# Patient Record
Sex: MALE | Race: WHITE | NOT HISPANIC OR LATINO | Employment: FULL TIME | URBAN - METROPOLITAN AREA
[De-identification: names, ages, dates, MRNs, and addresses within clinical notes are randomized per-mention and may not be internally consistent; named-entity substitution may affect disease eponyms.]

---

## 2017-03-02 ENCOUNTER — TRANSCRIBE ORDERS (OUTPATIENT)
Dept: LAB | Age: 37
End: 2017-03-02

## 2017-03-02 ENCOUNTER — ALLSCRIPTS OFFICE VISIT (OUTPATIENT)
Dept: OTHER | Facility: OTHER | Age: 37
End: 2017-03-02

## 2017-03-02 ENCOUNTER — APPOINTMENT (OUTPATIENT)
Dept: LAB | Age: 37
End: 2017-03-02
Payer: COMMERCIAL

## 2017-03-02 DIAGNOSIS — Z13.1 ENCOUNTER FOR SCREENING FOR DIABETES MELLITUS: ICD-10-CM

## 2017-03-02 DIAGNOSIS — M54.9 DORSALGIA: ICD-10-CM

## 2017-03-02 DIAGNOSIS — E66.01 MORBID (SEVERE) OBESITY DUE TO EXCESS CALORIES (HCC): ICD-10-CM

## 2017-03-02 DIAGNOSIS — Z13.220 ENCOUNTER FOR SCREENING FOR LIPOID DISORDERS: ICD-10-CM

## 2017-03-02 DIAGNOSIS — E11.9 TYPE 2 DIABETES MELLITUS WITHOUT COMPLICATIONS (HCC): ICD-10-CM

## 2017-03-02 LAB
ALBUMIN SERPL BCP-MCNC: 3.5 G/DL (ref 3.5–5)
ALP SERPL-CCNC: 91 U/L (ref 46–116)
ALT SERPL W P-5'-P-CCNC: 66 U/L (ref 12–78)
ANION GAP SERPL CALCULATED.3IONS-SCNC: 5 MMOL/L (ref 4–13)
AST SERPL W P-5'-P-CCNC: 26 U/L (ref 5–45)
BASOPHILS # BLD AUTO: 0.01 THOUSANDS/ΜL (ref 0–0.1)
BASOPHILS NFR BLD AUTO: 0 % (ref 0–1)
BILIRUB SERPL-MCNC: 0.82 MG/DL (ref 0.2–1)
BUN SERPL-MCNC: 11 MG/DL (ref 5–25)
CALCIUM SERPL-MCNC: 9.4 MG/DL (ref 8.3–10.1)
CHLORIDE SERPL-SCNC: 102 MMOL/L (ref 100–108)
CHOLEST SERPL-MCNC: 159 MG/DL (ref 50–200)
CO2 SERPL-SCNC: 34 MMOL/L (ref 21–32)
CREAT SERPL-MCNC: 0.93 MG/DL (ref 0.6–1.3)
EOSINOPHIL # BLD AUTO: 0.16 THOUSAND/ΜL (ref 0–0.61)
EOSINOPHIL NFR BLD AUTO: 2 % (ref 0–6)
ERYTHROCYTE [DISTWIDTH] IN BLOOD BY AUTOMATED COUNT: 16.1 % (ref 11.6–15.1)
EST. AVERAGE GLUCOSE BLD GHB EST-MCNC: 140 MG/DL
GFR SERPL CREATININE-BSD FRML MDRD: >60 ML/MIN/1.73SQ M
GLUCOSE SERPL-MCNC: 104 MG/DL (ref 65–140)
HBA1C MFR BLD: 6.5 % (ref 4.2–6.3)
HCT VFR BLD AUTO: 56.8 % (ref 36.5–49.3)
HDLC SERPL-MCNC: 37 MG/DL (ref 40–60)
HGB BLD-MCNC: 17.8 G/DL (ref 12–17)
LDLC SERPL CALC-MCNC: 104 MG/DL (ref 0–100)
LYMPHOCYTES # BLD AUTO: 1.64 THOUSANDS/ΜL (ref 0.6–4.47)
LYMPHOCYTES NFR BLD AUTO: 16 % (ref 14–44)
MCH RBC QN AUTO: 29.3 PG (ref 26.8–34.3)
MCHC RBC AUTO-ENTMCNC: 31.3 G/DL (ref 31.4–37.4)
MCV RBC AUTO: 93 FL (ref 82–98)
MONOCYTES # BLD AUTO: 0.67 THOUSAND/ΜL (ref 0.17–1.22)
MONOCYTES NFR BLD AUTO: 7 % (ref 4–12)
NEUTROPHILS # BLD AUTO: 7.66 THOUSANDS/ΜL (ref 1.85–7.62)
NEUTS SEG NFR BLD AUTO: 75 % (ref 43–75)
NRBC BLD AUTO-RTO: 0 /100 WBCS
PLATELET # BLD AUTO: 237 THOUSANDS/UL (ref 149–390)
PMV BLD AUTO: 10.1 FL (ref 8.9–12.7)
POTASSIUM SERPL-SCNC: 4.2 MMOL/L (ref 3.5–5.3)
PROT SERPL-MCNC: 8.5 G/DL (ref 6.4–8.2)
RBC # BLD AUTO: 6.08 MILLION/UL (ref 3.88–5.62)
SODIUM SERPL-SCNC: 141 MMOL/L (ref 136–145)
TRIGL SERPL-MCNC: 88 MG/DL
WBC # BLD AUTO: 10.16 THOUSAND/UL (ref 4.31–10.16)

## 2017-03-02 PROCEDURE — 36415 COLL VENOUS BLD VENIPUNCTURE: CPT

## 2017-03-02 PROCEDURE — 80053 COMPREHEN METABOLIC PANEL: CPT

## 2017-03-02 PROCEDURE — 85025 COMPLETE CBC W/AUTO DIFF WBC: CPT

## 2017-03-02 PROCEDURE — 83036 HEMOGLOBIN GLYCOSYLATED A1C: CPT

## 2017-03-02 PROCEDURE — 80061 LIPID PANEL: CPT

## 2017-03-03 ENCOUNTER — ALLSCRIPTS OFFICE VISIT (OUTPATIENT)
Dept: OTHER | Facility: OTHER | Age: 37
End: 2017-03-03

## 2017-03-03 ENCOUNTER — APPOINTMENT (EMERGENCY)
Dept: RADIOLOGY | Facility: HOSPITAL | Age: 37
DRG: 074 | End: 2017-03-03
Payer: COMMERCIAL

## 2017-03-03 ENCOUNTER — HOSPITAL ENCOUNTER (INPATIENT)
Facility: HOSPITAL | Age: 37
LOS: 1 days | Discharge: HOME HEALTH | DRG: 074 | End: 2017-03-06
Attending: EMERGENCY MEDICINE | Admitting: INTERNAL MEDICINE
Payer: COMMERCIAL

## 2017-03-03 ENCOUNTER — HOSPITAL ENCOUNTER (OUTPATIENT)
Dept: RADIOLOGY | Facility: HOSPITAL | Age: 37
Discharge: HOME | End: 2017-03-03
Payer: COMMERCIAL

## 2017-03-03 DIAGNOSIS — M54.42 ACUTE RIGHT-SIDED LOW BACK PAIN WITH BILATERAL SCIATICA: Primary | ICD-10-CM

## 2017-03-03 DIAGNOSIS — G83.4 CAUDA EQUINA COMPRESSION (HCC): ICD-10-CM

## 2017-03-03 DIAGNOSIS — M54.41 ACUTE RIGHT-SIDED LOW BACK PAIN WITH BILATERAL SCIATICA: Primary | ICD-10-CM

## 2017-03-03 PROBLEM — M54.40 ACUTE RIGHT-SIDED LOW BACK PAIN WITH SCIATICA: Status: ACTIVE | Noted: 2017-03-03

## 2017-03-03 PROBLEM — E66.01 MORBID OBESITY (HCC): Chronic | Status: ACTIVE | Noted: 2017-03-03

## 2017-03-03 PROBLEM — E11.9 TYPE 2 DIABETES MELLITUS (HCC): Chronic | Status: ACTIVE | Noted: 2017-03-03

## 2017-03-03 LAB
BACTERIA UR QL AUTO: NORMAL /HPF
BILIRUB UR QL STRIP: ABNORMAL
CLARITY UR: CLEAR
COLOR UR: ABNORMAL
COLOR, POC: NORMAL
GLUCOSE UR STRIP-MCNC: NEGATIVE MG/DL
HGB UR QL STRIP.AUTO: NEGATIVE
HYALINE CASTS #/AREA URNS LPF: NORMAL /LPF
KETONES UR STRIP-MCNC: NEGATIVE MG/DL
LEUKOCYTE ESTERASE UR QL STRIP: NEGATIVE
NITRITE UR QL STRIP: NEGATIVE
NON-SQ EPI CELLS URNS QL MICRO: NORMAL /HPF
PH UR STRIP.AUTO: 6 [PH] (ref 4.5–8)
PROT UR STRIP-MCNC: ABNORMAL MG/DL
RBC #/AREA URNS AUTO: NORMAL /HPF
SP GR UR STRIP.AUTO: 1.02 (ref 1–1.03)
UROBILINOGEN UR QL STRIP.AUTO: >=8 E.U./DL
WBC #/AREA URNS AUTO: NORMAL /HPF

## 2017-03-03 PROCEDURE — 96374 THER/PROPH/DIAG INJ IV PUSH: CPT

## 2017-03-03 PROCEDURE — 36415 COLL VENOUS BLD VENIPUNCTURE: CPT | Performed by: EMERGENCY MEDICINE

## 2017-03-03 PROCEDURE — 85007 BL SMEAR W/DIFF WBC COUNT: CPT | Performed by: EMERGENCY MEDICINE

## 2017-03-03 PROCEDURE — 96376 TX/PRO/DX INJ SAME DRUG ADON: CPT

## 2017-03-03 PROCEDURE — 85027 COMPLETE CBC AUTOMATED: CPT | Performed by: EMERGENCY MEDICINE

## 2017-03-03 PROCEDURE — 81001 URINALYSIS AUTO W/SCOPE: CPT

## 2017-03-03 PROCEDURE — 72131 CT LUMBAR SPINE W/O DYE: CPT

## 2017-03-03 PROCEDURE — 99285 EMERGENCY DEPT VISIT HI MDM: CPT

## 2017-03-03 PROCEDURE — 81002 URINALYSIS NONAUTO W/O SCOPE: CPT | Performed by: EMERGENCY MEDICINE

## 2017-03-03 PROCEDURE — 96375 TX/PRO/DX INJ NEW DRUG ADDON: CPT

## 2017-03-03 RX ORDER — TRAMADOL HYDROCHLORIDE 50 MG/1
50 TABLET ORAL EVERY 12 HOURS PRN
COMMUNITY
End: 2018-03-01

## 2017-03-03 RX ORDER — METHOCARBAMOL 500 MG/1
500 TABLET, FILM COATED ORAL 3 TIMES DAILY
COMMUNITY
End: 2017-03-06 | Stop reason: HOSPADM

## 2017-03-03 RX ORDER — PREDNISONE 20 MG/1
20 TABLET ORAL 2 TIMES DAILY
COMMUNITY
End: 2017-03-06 | Stop reason: HOSPADM

## 2017-03-03 RX ORDER — KETOROLAC TROMETHAMINE 30 MG/ML
15 INJECTION, SOLUTION INTRAMUSCULAR; INTRAVENOUS ONCE
Status: COMPLETED | OUTPATIENT
Start: 2017-03-03 | End: 2017-03-03

## 2017-03-03 RX ORDER — DEXAMETHASONE SODIUM PHOSPHATE 4 MG/ML
4 INJECTION, SOLUTION INTRA-ARTICULAR; INTRALESIONAL; INTRAMUSCULAR; INTRAVENOUS; SOFT TISSUE EVERY 6 HOURS
Status: DISCONTINUED | OUTPATIENT
Start: 2017-03-04 | End: 2017-03-06

## 2017-03-03 RX ADMIN — HYDROMORPHONE HYDROCHLORIDE 1 MG: 1 INJECTION, SOLUTION INTRAMUSCULAR; INTRAVENOUS; SUBCUTANEOUS at 20:23

## 2017-03-03 RX ADMIN — DEXAMETHASONE SODIUM PHOSPHATE 6 MG: 10 INJECTION INTRAMUSCULAR; INTRAVENOUS at 20:22

## 2017-03-03 RX ADMIN — HYDROMORPHONE HYDROCHLORIDE 1 MG: 1 INJECTION, SOLUTION INTRAMUSCULAR; INTRAVENOUS; SUBCUTANEOUS at 21:00

## 2017-03-03 RX ADMIN — KETOROLAC TROMETHAMINE 15 MG: 30 INJECTION, SOLUTION INTRAMUSCULAR at 21:00

## 2017-03-03 RX ADMIN — Medication 1 APPLICATION: at 20:01

## 2017-03-04 ENCOUNTER — APPOINTMENT (OUTPATIENT)
Dept: RADIOLOGY | Facility: HOSPITAL | Age: 37
DRG: 074 | End: 2017-03-04
Payer: COMMERCIAL

## 2017-03-04 LAB
ALBUMIN SERPL BCP-MCNC: 3.3 G/DL (ref 3.5–5)
ALP SERPL-CCNC: 94 U/L (ref 46–116)
ALT SERPL W P-5'-P-CCNC: 81 U/L (ref 12–78)
ANION GAP SERPL CALCULATED.3IONS-SCNC: 7 MMOL/L (ref 4–13)
ANION GAP SERPL CALCULATED.3IONS-SCNC: 9 MMOL/L (ref 4–13)
APTT PPP: 28 SECONDS (ref 24–36)
AST SERPL W P-5'-P-CCNC: 30 U/L (ref 5–45)
BASOPHILS # BLD MANUAL: 0 THOUSAND/UL (ref 0–0.1)
BASOPHILS NFR MAR MANUAL: 0 % (ref 0–1)
BILIRUB SERPL-MCNC: 1.08 MG/DL (ref 0.2–1)
BUN SERPL-MCNC: 16 MG/DL (ref 5–25)
BUN SERPL-MCNC: 17 MG/DL (ref 5–25)
CALCIUM SERPL-MCNC: 9.4 MG/DL (ref 8.3–10.1)
CALCIUM SERPL-MCNC: 9.4 MG/DL (ref 8.3–10.1)
CHLORIDE SERPL-SCNC: 100 MMOL/L (ref 100–108)
CHLORIDE SERPL-SCNC: 101 MMOL/L (ref 100–108)
CO2 SERPL-SCNC: 27 MMOL/L (ref 21–32)
CO2 SERPL-SCNC: 29 MMOL/L (ref 21–32)
CREAT SERPL-MCNC: 0.77 MG/DL (ref 0.6–1.3)
CREAT SERPL-MCNC: 0.79 MG/DL (ref 0.6–1.3)
EOSINOPHIL # BLD MANUAL: 0.14 THOUSAND/UL (ref 0–0.4)
EOSINOPHIL NFR BLD MANUAL: 1 % (ref 0–6)
ERYTHROCYTE [DISTWIDTH] IN BLOOD BY AUTOMATED COUNT: 15.8 % (ref 11.6–15.1)
ERYTHROCYTE [DISTWIDTH] IN BLOOD BY AUTOMATED COUNT: 16.1 % (ref 11.6–15.1)
GFR SERPL CREATININE-BSD FRML MDRD: >60 ML/MIN/1.73SQ M
GFR SERPL CREATININE-BSD FRML MDRD: >60 ML/MIN/1.73SQ M
GLUCOSE SERPL-MCNC: 107 MG/DL (ref 65–140)
GLUCOSE SERPL-MCNC: 114 MG/DL (ref 65–140)
GLUCOSE SERPL-MCNC: 118 MG/DL (ref 65–140)
GLUCOSE SERPL-MCNC: 120 MG/DL (ref 65–140)
GLUCOSE SERPL-MCNC: 156 MG/DL (ref 65–140)
GLUCOSE SERPL-MCNC: 96 MG/DL (ref 65–140)
HCT VFR BLD AUTO: 55 % (ref 36.5–49.3)
HCT VFR BLD AUTO: 56.6 % (ref 36.5–49.3)
HGB BLD-MCNC: 17.8 G/DL (ref 12–17)
HGB BLD-MCNC: 18.4 G/DL (ref 12–17)
INR PPP: 1.11 (ref 0.86–1.16)
LG PLATELETS BLD QL SMEAR: PRESENT
LYMPHOCYTES # BLD AUTO: 0.42 THOUSAND/UL (ref 0.6–4.47)
LYMPHOCYTES # BLD AUTO: 3 % (ref 14–44)
MCH RBC QN AUTO: 29.3 PG (ref 26.8–34.3)
MCH RBC QN AUTO: 29.5 PG (ref 26.8–34.3)
MCHC RBC AUTO-ENTMCNC: 32.4 G/DL (ref 31.4–37.4)
MCHC RBC AUTO-ENTMCNC: 32.5 G/DL (ref 31.4–37.4)
MCV RBC AUTO: 91 FL (ref 82–98)
MCV RBC AUTO: 91 FL (ref 82–98)
MONOCYTES # BLD AUTO: 0.56 THOUSAND/UL (ref 0–1.22)
MONOCYTES NFR BLD: 4 % (ref 4–12)
NEUTROPHILS # BLD MANUAL: 12.91 THOUSAND/UL (ref 1.85–7.62)
NEUTS BAND NFR BLD MANUAL: 1 % (ref 0–8)
NEUTS SEG NFR BLD AUTO: 91 % (ref 43–75)
NRBC BLD AUTO-RTO: 0 /100 WBCS
PLATELET # BLD AUTO: 259 THOUSANDS/UL (ref 149–390)
PLATELET # BLD AUTO: 266 THOUSANDS/UL (ref 149–390)
PLATELET # BLD AUTO: 266 THOUSANDS/UL (ref 149–390)
PLATELET BLD QL SMEAR: ADEQUATE
PMV BLD AUTO: 10.1 FL (ref 8.9–12.7)
PMV BLD AUTO: 10.5 FL (ref 8.9–12.7)
PMV BLD AUTO: 10.7 FL (ref 8.9–12.7)
POTASSIUM SERPL-SCNC: 4.4 MMOL/L (ref 3.5–5.3)
POTASSIUM SERPL-SCNC: 4.4 MMOL/L (ref 3.5–5.3)
PROT SERPL-MCNC: 8.4 G/DL (ref 6.4–8.2)
PROTHROMBIN TIME: 14.4 SECONDS (ref 12–14.3)
RBC # BLD AUTO: 6.08 MILLION/UL (ref 3.88–5.62)
RBC # BLD AUTO: 6.23 MILLION/UL (ref 3.88–5.62)
RBC MORPH BLD: NORMAL
SODIUM SERPL-SCNC: 136 MMOL/L (ref 136–145)
SODIUM SERPL-SCNC: 137 MMOL/L (ref 136–145)
TOTAL CELLS COUNTED SPEC: 100
WBC # BLD AUTO: 11.97 THOUSAND/UL (ref 4.31–10.16)
WBC # BLD AUTO: 14.03 THOUSAND/UL (ref 4.31–10.16)

## 2017-03-04 PROCEDURE — 80048 BASIC METABOLIC PNL TOTAL CA: CPT | Performed by: EMERGENCY MEDICINE

## 2017-03-04 PROCEDURE — 62305 MYELOGRAPHY LUMBAR INJECTION: CPT

## 2017-03-04 PROCEDURE — 85730 THROMBOPLASTIN TIME PARTIAL: CPT | Performed by: EMERGENCY MEDICINE

## 2017-03-04 PROCEDURE — 85027 COMPLETE CBC AUTOMATED: CPT | Performed by: INTERNAL MEDICINE

## 2017-03-04 PROCEDURE — 85610 PROTHROMBIN TIME: CPT | Performed by: EMERGENCY MEDICINE

## 2017-03-04 PROCEDURE — B01B1ZZ FLUOROSCOPY OF SPINAL CORD USING LOW OSMOLAR CONTRAST: ICD-10-PCS | Performed by: RADIOLOGY

## 2017-03-04 PROCEDURE — 72131 CT LUMBAR SPINE W/O DYE: CPT

## 2017-03-04 PROCEDURE — 82948 REAGENT STRIP/BLOOD GLUCOSE: CPT

## 2017-03-04 PROCEDURE — 80053 COMPREHEN METABOLIC PANEL: CPT | Performed by: INTERNAL MEDICINE

## 2017-03-04 PROCEDURE — 85049 AUTOMATED PLATELET COUNT: CPT | Performed by: INTERNAL MEDICINE

## 2017-03-04 RX ORDER — ONDANSETRON 2 MG/ML
4 INJECTION INTRAMUSCULAR; INTRAVENOUS EVERY 8 HOURS PRN
Status: DISCONTINUED | OUTPATIENT
Start: 2017-03-04 | End: 2017-03-06 | Stop reason: HOSPADM

## 2017-03-04 RX ORDER — SODIUM BICARBONATE 42 MG/ML
2.4 INJECTION, SOLUTION INTRAVENOUS
Status: COMPLETED | OUTPATIENT
Start: 2017-03-04 | End: 2017-03-04

## 2017-03-04 RX ORDER — SODIUM BICARBONATE 42 MG/ML
2.4 INJECTION, SOLUTION INTRAVENOUS
Status: DISCONTINUED | OUTPATIENT
Start: 2017-03-04 | End: 2017-03-04

## 2017-03-04 RX ORDER — TRAMADOL HYDROCHLORIDE 50 MG/1
50 TABLET ORAL EVERY 12 HOURS PRN
Status: DISCONTINUED | OUTPATIENT
Start: 2017-03-04 | End: 2017-03-06 | Stop reason: HOSPADM

## 2017-03-04 RX ORDER — METHOCARBAMOL 500 MG/1
500 TABLET, FILM COATED ORAL 3 TIMES DAILY
Status: DISCONTINUED | OUTPATIENT
Start: 2017-03-04 | End: 2017-03-06

## 2017-03-04 RX ORDER — HEPARIN SODIUM 5000 [USP'U]/ML
5000 INJECTION, SOLUTION INTRAVENOUS; SUBCUTANEOUS EVERY 8 HOURS SCHEDULED
Status: DISCONTINUED | OUTPATIENT
Start: 2017-03-04 | End: 2017-03-06 | Stop reason: HOSPADM

## 2017-03-04 RX ORDER — DOCUSATE SODIUM 100 MG/1
100 CAPSULE, LIQUID FILLED ORAL 2 TIMES DAILY
Status: DISCONTINUED | OUTPATIENT
Start: 2017-03-04 | End: 2017-03-06 | Stop reason: HOSPADM

## 2017-03-04 RX ORDER — ACETAMINOPHEN 325 MG/1
650 TABLET ORAL EVERY 6 HOURS PRN
Status: DISCONTINUED | OUTPATIENT
Start: 2017-03-04 | End: 2017-03-06 | Stop reason: HOSPADM

## 2017-03-04 RX ADMIN — DEXAMETHASONE SODIUM PHOSPHATE 4 MG: 4 INJECTION, SOLUTION INTRAMUSCULAR; INTRAVENOUS at 04:52

## 2017-03-04 RX ADMIN — HEPARIN SODIUM 5000 UNITS: 5000 INJECTION, SOLUTION INTRAVENOUS; SUBCUTANEOUS at 04:52

## 2017-03-04 RX ADMIN — METHOCARBAMOL 500 MG: 500 TABLET ORAL at 00:36

## 2017-03-04 RX ADMIN — HEPARIN SODIUM 5000 UNITS: 5000 INJECTION, SOLUTION INTRAVENOUS; SUBCUTANEOUS at 21:41

## 2017-03-04 RX ADMIN — HYDROMORPHONE HYDROCHLORIDE 1 MG: 1 INJECTION, SOLUTION INTRAMUSCULAR; INTRAVENOUS; SUBCUTANEOUS at 00:37

## 2017-03-04 RX ADMIN — DEXAMETHASONE SODIUM PHOSPHATE 4 MG: 4 INJECTION, SOLUTION INTRAMUSCULAR; INTRAVENOUS at 11:41

## 2017-03-04 RX ADMIN — HYDROMORPHONE HYDROCHLORIDE 1 MG: 1 INJECTION, SOLUTION INTRAMUSCULAR; INTRAVENOUS; SUBCUTANEOUS at 12:08

## 2017-03-04 RX ADMIN — ACETAMINOPHEN 650 MG: 325 TABLET, FILM COATED ORAL at 21:41

## 2017-03-04 RX ADMIN — IOHEXOL 16 ML: 180 INJECTION INTRAVENOUS at 14:12

## 2017-03-04 RX ADMIN — DOCUSATE SODIUM 100 MG: 100 CAPSULE, LIQUID FILLED ORAL at 17:19

## 2017-03-04 RX ADMIN — SODIUM BICARBONATE 1 ML: 42 SOLUTION INTRAVENOUS at 14:15

## 2017-03-04 RX ADMIN — TRAMADOL HYDROCHLORIDE 50 MG: 50 TABLET, COATED ORAL at 04:42

## 2017-03-04 RX ADMIN — DOCUSATE SODIUM 100 MG: 100 CAPSULE, LIQUID FILLED ORAL at 09:45

## 2017-03-04 RX ADMIN — DEXAMETHASONE SODIUM PHOSPHATE 4 MG: 4 INJECTION, SOLUTION INTRAMUSCULAR; INTRAVENOUS at 17:19

## 2017-03-04 RX ADMIN — METHOCARBAMOL 500 MG: 500 TABLET ORAL at 17:21

## 2017-03-04 RX ADMIN — HEPARIN SODIUM 5000 UNITS: 5000 INJECTION, SOLUTION INTRAVENOUS; SUBCUTANEOUS at 00:44

## 2017-03-04 RX ADMIN — TRAMADOL HYDROCHLORIDE 50 MG: 50 TABLET, COATED ORAL at 17:21

## 2017-03-04 RX ADMIN — METHOCARBAMOL 500 MG: 500 TABLET ORAL at 09:45

## 2017-03-04 RX ADMIN — DEXAMETHASONE SODIUM PHOSPHATE 4 MG: 4 INJECTION, SOLUTION INTRAMUSCULAR; INTRAVENOUS at 00:44

## 2017-03-04 RX ADMIN — METHOCARBAMOL 500 MG: 500 TABLET ORAL at 21:41

## 2017-03-05 LAB
GLUCOSE SERPL-MCNC: 108 MG/DL (ref 65–140)
GLUCOSE SERPL-MCNC: 167 MG/DL (ref 65–140)
GLUCOSE SERPL-MCNC: 92 MG/DL (ref 65–140)
GLUCOSE SERPL-MCNC: 98 MG/DL (ref 65–140)

## 2017-03-05 PROCEDURE — 82948 REAGENT STRIP/BLOOD GLUCOSE: CPT

## 2017-03-05 RX ORDER — KETOROLAC TROMETHAMINE 30 MG/ML
30 INJECTION, SOLUTION INTRAMUSCULAR; INTRAVENOUS ONCE
Status: COMPLETED | OUTPATIENT
Start: 2017-03-05 | End: 2017-03-05

## 2017-03-05 RX ADMIN — TRAMADOL HYDROCHLORIDE 50 MG: 50 TABLET, COATED ORAL at 05:45

## 2017-03-05 RX ADMIN — DEXAMETHASONE SODIUM PHOSPHATE 4 MG: 4 INJECTION, SOLUTION INTRAMUSCULAR; INTRAVENOUS at 05:45

## 2017-03-05 RX ADMIN — HEPARIN SODIUM 5000 UNITS: 5000 INJECTION, SOLUTION INTRAVENOUS; SUBCUTANEOUS at 21:49

## 2017-03-05 RX ADMIN — ACETAMINOPHEN 650 MG: 325 TABLET, FILM COATED ORAL at 08:40

## 2017-03-05 RX ADMIN — HEPARIN SODIUM 5000 UNITS: 5000 INJECTION, SOLUTION INTRAVENOUS; SUBCUTANEOUS at 05:45

## 2017-03-05 RX ADMIN — METHOCARBAMOL 500 MG: 500 TABLET ORAL at 21:49

## 2017-03-05 RX ADMIN — DEXAMETHASONE SODIUM PHOSPHATE 4 MG: 4 INJECTION, SOLUTION INTRAMUSCULAR; INTRAVENOUS at 17:22

## 2017-03-05 RX ADMIN — DEXAMETHASONE SODIUM PHOSPHATE 4 MG: 4 INJECTION, SOLUTION INTRAMUSCULAR; INTRAVENOUS at 11:46

## 2017-03-05 RX ADMIN — DEXAMETHASONE SODIUM PHOSPHATE 4 MG: 4 INJECTION, SOLUTION INTRAMUSCULAR; INTRAVENOUS at 01:05

## 2017-03-05 RX ADMIN — KETOROLAC TROMETHAMINE 30 MG: 30 INJECTION, SOLUTION INTRAMUSCULAR at 01:05

## 2017-03-05 RX ADMIN — METHOCARBAMOL 500 MG: 500 TABLET ORAL at 08:41

## 2017-03-05 RX ADMIN — INSULIN LISPRO 1 UNITS: 100 INJECTION, SOLUTION INTRAVENOUS; SUBCUTANEOUS at 11:47

## 2017-03-05 RX ADMIN — DOCUSATE SODIUM 100 MG: 100 CAPSULE, LIQUID FILLED ORAL at 17:23

## 2017-03-05 RX ADMIN — DOCUSATE SODIUM 100 MG: 100 CAPSULE, LIQUID FILLED ORAL at 08:41

## 2017-03-05 RX ADMIN — METHOCARBAMOL 500 MG: 500 TABLET ORAL at 17:23

## 2017-03-06 ENCOUNTER — APPOINTMENT (INPATIENT)
Dept: PHYSICAL THERAPY | Facility: HOSPITAL | Age: 37
DRG: 074 | End: 2017-03-06
Payer: COMMERCIAL

## 2017-03-06 ENCOUNTER — APPOINTMENT (INPATIENT)
Dept: OCCUPATIONAL THERAPY | Facility: HOSPITAL | Age: 37
DRG: 074 | End: 2017-03-06
Payer: COMMERCIAL

## 2017-03-06 VITALS
BODY MASS INDEX: 41.75 KG/M2 | HEART RATE: 66 BPM | DIASTOLIC BLOOD PRESSURE: 95 MMHG | OXYGEN SATURATION: 90 % | SYSTOLIC BLOOD PRESSURE: 163 MMHG | WEIGHT: 315 LBS | TEMPERATURE: 97.7 F | RESPIRATION RATE: 20 BRPM | HEIGHT: 73 IN

## 2017-03-06 LAB
GLUCOSE SERPL-MCNC: 58 MG/DL (ref 65–140)
GLUCOSE SERPL-MCNC: 84 MG/DL (ref 65–140)
GLUCOSE SERPL-MCNC: 99 MG/DL (ref 65–140)

## 2017-03-06 PROCEDURE — G8988 SELF CARE GOAL STATUS: HCPCS

## 2017-03-06 PROCEDURE — G8987 SELF CARE CURRENT STATUS: HCPCS

## 2017-03-06 PROCEDURE — 97163 PT EVAL HIGH COMPLEX 45 MIN: CPT

## 2017-03-06 PROCEDURE — G8978 MOBILITY CURRENT STATUS: HCPCS

## 2017-03-06 PROCEDURE — 97116 GAIT TRAINING THERAPY: CPT

## 2017-03-06 PROCEDURE — 97166 OT EVAL MOD COMPLEX 45 MIN: CPT

## 2017-03-06 PROCEDURE — G8979 MOBILITY GOAL STATUS: HCPCS

## 2017-03-06 PROCEDURE — 82948 REAGENT STRIP/BLOOD GLUCOSE: CPT

## 2017-03-06 RX ORDER — PREDNISONE 20 MG/1
60 TABLET ORAL DAILY
Status: DISCONTINUED | OUTPATIENT
Start: 2017-03-06 | End: 2017-03-06

## 2017-03-06 RX ORDER — GABAPENTIN 300 MG/1
300 CAPSULE ORAL ONCE
Status: DISCONTINUED | OUTPATIENT
Start: 2017-03-06 | End: 2017-03-06 | Stop reason: HOSPADM

## 2017-03-06 RX ORDER — OXYCODONE AND ACETAMINOPHEN 7.5; 325 MG/1; MG/1
1 TABLET ORAL EVERY 6 HOURS PRN
Qty: 45 TABLET | Refills: 0 | Status: SHIPPED | OUTPATIENT
Start: 2017-03-06 | End: 2017-03-16

## 2017-03-06 RX ORDER — GABAPENTIN 300 MG/1
300 CAPSULE ORAL 2 TIMES DAILY
Qty: 60 CAPSULE | Refills: 0 | Status: SHIPPED | OUTPATIENT
Start: 2017-03-07 | End: 2018-03-01

## 2017-03-06 RX ORDER — GABAPENTIN 300 MG/1
300 CAPSULE ORAL 2 TIMES DAILY
Status: DISCONTINUED | OUTPATIENT
Start: 2017-03-07 | End: 2017-03-06 | Stop reason: HOSPADM

## 2017-03-06 RX ORDER — METHOCARBAMOL 750 MG/1
750 TABLET, FILM COATED ORAL 3 TIMES DAILY
Status: DISCONTINUED | OUTPATIENT
Start: 2017-03-06 | End: 2017-03-06 | Stop reason: HOSPADM

## 2017-03-06 RX ORDER — PREDNISONE 20 MG/1
60 TABLET ORAL ONCE
Status: COMPLETED | OUTPATIENT
Start: 2017-03-06 | End: 2017-03-06

## 2017-03-06 RX ORDER — PREDNISONE 10 MG/1
10 TABLET ORAL DAILY
Qty: 42 TABLET | Refills: 0 | Status: SHIPPED | OUTPATIENT
Start: 2017-03-06 | End: 2017-04-05

## 2017-03-06 RX ORDER — METHOCARBAMOL 750 MG/1
750 TABLET, FILM COATED ORAL 3 TIMES DAILY
Qty: 90 TABLET | Refills: 0 | Status: SHIPPED | OUTPATIENT
Start: 2017-03-06 | End: 2018-03-01

## 2017-03-06 RX ORDER — DOCUSATE SODIUM 100 MG/1
100 CAPSULE, LIQUID FILLED ORAL 2 TIMES DAILY
Qty: 60 CAPSULE | Refills: 0 | Status: SHIPPED | OUTPATIENT
Start: 2017-03-06 | End: 2018-03-01

## 2017-03-06 RX ADMIN — METHOCARBAMOL 500 MG: 500 TABLET ORAL at 09:37

## 2017-03-06 RX ADMIN — TRAMADOL HYDROCHLORIDE 50 MG: 50 TABLET, COATED ORAL at 12:51

## 2017-03-06 RX ADMIN — DOCUSATE SODIUM 100 MG: 100 CAPSULE, LIQUID FILLED ORAL at 09:37

## 2017-03-06 RX ADMIN — TRAMADOL HYDROCHLORIDE 50 MG: 50 TABLET, COATED ORAL at 00:34

## 2017-03-06 RX ADMIN — PREDNISONE 60 MG: 20 TABLET ORAL at 12:50

## 2017-03-06 RX ADMIN — DEXAMETHASONE SODIUM PHOSPHATE 4 MG: 4 INJECTION, SOLUTION INTRAMUSCULAR; INTRAVENOUS at 06:15

## 2017-03-06 RX ADMIN — HEPARIN SODIUM 5000 UNITS: 5000 INJECTION, SOLUTION INTRAVENOUS; SUBCUTANEOUS at 06:15

## 2017-03-06 RX ADMIN — HEPARIN SODIUM 5000 UNITS: 5000 INJECTION, SOLUTION INTRAVENOUS; SUBCUTANEOUS at 13:01

## 2017-03-06 RX ADMIN — DEXAMETHASONE SODIUM PHOSPHATE 4 MG: 4 INJECTION, SOLUTION INTRAMUSCULAR; INTRAVENOUS at 00:15

## 2017-03-08 ENCOUNTER — APPOINTMENT (OUTPATIENT)
Dept: PHYSICAL THERAPY | Facility: REHABILITATION | Age: 37
End: 2017-03-08
Payer: COMMERCIAL

## 2017-03-08 PROCEDURE — 97014 ELECTRIC STIMULATION THERAPY: CPT

## 2017-03-08 PROCEDURE — 97162 PT EVAL MOD COMPLEX 30 MIN: CPT

## 2017-03-10 ENCOUNTER — GENERIC CONVERSION - ENCOUNTER (OUTPATIENT)
Dept: OTHER | Facility: OTHER | Age: 37
End: 2017-03-10

## 2017-03-10 ENCOUNTER — APPOINTMENT (OUTPATIENT)
Dept: PHYSICAL THERAPY | Facility: REHABILITATION | Age: 37
End: 2017-03-10
Payer: COMMERCIAL

## 2017-03-10 ENCOUNTER — ALLSCRIPTS OFFICE VISIT (OUTPATIENT)
Dept: OTHER | Facility: OTHER | Age: 37
End: 2017-03-10

## 2017-03-10 PROCEDURE — 97014 ELECTRIC STIMULATION THERAPY: CPT

## 2017-03-10 PROCEDURE — 97110 THERAPEUTIC EXERCISES: CPT

## 2017-03-10 PROCEDURE — 97140 MANUAL THERAPY 1/> REGIONS: CPT

## 2017-03-14 ENCOUNTER — APPOINTMENT (OUTPATIENT)
Dept: PHYSICAL THERAPY | Facility: REHABILITATION | Age: 37
End: 2017-03-14
Payer: COMMERCIAL

## 2017-03-15 ENCOUNTER — APPOINTMENT (OUTPATIENT)
Dept: PHYSICAL THERAPY | Facility: REHABILITATION | Age: 37
End: 2017-03-15
Payer: COMMERCIAL

## 2017-03-15 PROCEDURE — 97012 MECHANICAL TRACTION THERAPY: CPT

## 2017-03-15 PROCEDURE — 97110 THERAPEUTIC EXERCISES: CPT

## 2017-03-16 ENCOUNTER — APPOINTMENT (OUTPATIENT)
Dept: PHYSICAL THERAPY | Facility: REHABILITATION | Age: 37
End: 2017-03-16
Payer: COMMERCIAL

## 2017-03-17 ENCOUNTER — APPOINTMENT (OUTPATIENT)
Dept: PHYSICAL THERAPY | Facility: REHABILITATION | Age: 37
End: 2017-03-17
Payer: COMMERCIAL

## 2017-03-17 PROCEDURE — 97014 ELECTRIC STIMULATION THERAPY: CPT

## 2017-03-17 PROCEDURE — 97110 THERAPEUTIC EXERCISES: CPT

## 2017-03-17 PROCEDURE — 97012 MECHANICAL TRACTION THERAPY: CPT

## 2017-03-20 ENCOUNTER — APPOINTMENT (OUTPATIENT)
Dept: PHYSICAL THERAPY | Facility: REHABILITATION | Age: 37
End: 2017-03-20
Payer: COMMERCIAL

## 2017-03-20 PROCEDURE — 97012 MECHANICAL TRACTION THERAPY: CPT

## 2017-03-20 PROCEDURE — 97110 THERAPEUTIC EXERCISES: CPT

## 2017-03-20 PROCEDURE — 97014 ELECTRIC STIMULATION THERAPY: CPT

## 2017-03-21 ENCOUNTER — ALLSCRIPTS OFFICE VISIT (OUTPATIENT)
Dept: OTHER | Facility: OTHER | Age: 37
End: 2017-03-21

## 2017-03-24 ENCOUNTER — TRANSCRIBE ORDERS (OUTPATIENT)
Dept: ADMINISTRATIVE | Facility: HOSPITAL | Age: 37
End: 2017-03-24

## 2017-03-24 ENCOUNTER — APPOINTMENT (OUTPATIENT)
Dept: PHYSICAL THERAPY | Facility: REHABILITATION | Age: 37
End: 2017-03-24
Payer: COMMERCIAL

## 2017-03-24 PROCEDURE — 97014 ELECTRIC STIMULATION THERAPY: CPT

## 2017-03-24 PROCEDURE — 97110 THERAPEUTIC EXERCISES: CPT

## 2017-03-24 PROCEDURE — 97140 MANUAL THERAPY 1/> REGIONS: CPT

## 2017-03-24 PROCEDURE — 97012 MECHANICAL TRACTION THERAPY: CPT

## 2017-04-03 ENCOUNTER — APPOINTMENT (OUTPATIENT)
Dept: PHYSICAL THERAPY | Facility: REHABILITATION | Age: 37
End: 2017-04-03
Payer: COMMERCIAL

## 2017-04-04 ENCOUNTER — GENERIC CONVERSION - ENCOUNTER (OUTPATIENT)
Dept: OTHER | Facility: OTHER | Age: 37
End: 2017-04-04

## 2017-04-04 ENCOUNTER — APPOINTMENT (OUTPATIENT)
Dept: PHYSICAL THERAPY | Facility: REHABILITATION | Age: 37
End: 2017-04-04
Payer: COMMERCIAL

## 2017-04-04 PROCEDURE — 97164 PT RE-EVAL EST PLAN CARE: CPT

## 2017-04-04 PROCEDURE — 97110 THERAPEUTIC EXERCISES: CPT

## 2017-04-04 PROCEDURE — 97012 MECHANICAL TRACTION THERAPY: CPT

## 2017-04-04 PROCEDURE — 97014 ELECTRIC STIMULATION THERAPY: CPT

## 2017-04-05 ENCOUNTER — ALLSCRIPTS OFFICE VISIT (OUTPATIENT)
Dept: OTHER | Facility: OTHER | Age: 37
End: 2017-04-05

## 2017-04-05 DIAGNOSIS — E66.01 MORBID (SEVERE) OBESITY DUE TO EXCESS CALORIES (HCC): ICD-10-CM

## 2017-04-05 DIAGNOSIS — M54.9 DORSALGIA: ICD-10-CM

## 2017-04-05 DIAGNOSIS — E11.9 TYPE 2 DIABETES MELLITUS WITHOUT COMPLICATIONS (HCC): ICD-10-CM

## 2017-04-06 ENCOUNTER — APPOINTMENT (OUTPATIENT)
Dept: PHYSICAL THERAPY | Facility: REHABILITATION | Age: 37
End: 2017-04-06
Payer: COMMERCIAL

## 2017-04-06 PROCEDURE — 97140 MANUAL THERAPY 1/> REGIONS: CPT

## 2017-04-06 PROCEDURE — 97014 ELECTRIC STIMULATION THERAPY: CPT

## 2017-04-06 PROCEDURE — 97012 MECHANICAL TRACTION THERAPY: CPT

## 2017-04-06 PROCEDURE — 97110 THERAPEUTIC EXERCISES: CPT

## 2017-04-11 ENCOUNTER — APPOINTMENT (OUTPATIENT)
Dept: PHYSICAL THERAPY | Facility: REHABILITATION | Age: 37
End: 2017-04-11
Payer: COMMERCIAL

## 2017-04-11 PROCEDURE — 97014 ELECTRIC STIMULATION THERAPY: CPT

## 2017-04-11 PROCEDURE — 97110 THERAPEUTIC EXERCISES: CPT

## 2017-04-11 PROCEDURE — 97012 MECHANICAL TRACTION THERAPY: CPT

## 2017-04-11 PROCEDURE — 97140 MANUAL THERAPY 1/> REGIONS: CPT

## 2017-04-13 ENCOUNTER — ALLSCRIPTS OFFICE VISIT (OUTPATIENT)
Dept: OTHER | Facility: OTHER | Age: 37
End: 2017-04-13

## 2017-04-14 ENCOUNTER — APPOINTMENT (OUTPATIENT)
Dept: PHYSICAL THERAPY | Facility: REHABILITATION | Age: 37
End: 2017-04-14
Payer: COMMERCIAL

## 2017-04-14 PROCEDURE — 97110 THERAPEUTIC EXERCISES: CPT

## 2017-04-14 PROCEDURE — 97140 MANUAL THERAPY 1/> REGIONS: CPT

## 2017-04-14 PROCEDURE — 97012 MECHANICAL TRACTION THERAPY: CPT

## 2017-04-17 ENCOUNTER — APPOINTMENT (OUTPATIENT)
Dept: PHYSICAL THERAPY | Facility: REHABILITATION | Age: 37
End: 2017-04-17
Payer: COMMERCIAL

## 2017-04-17 PROCEDURE — 97110 THERAPEUTIC EXERCISES: CPT

## 2017-04-17 PROCEDURE — 97014 ELECTRIC STIMULATION THERAPY: CPT

## 2017-04-17 PROCEDURE — 97140 MANUAL THERAPY 1/> REGIONS: CPT

## 2017-04-17 PROCEDURE — 97012 MECHANICAL TRACTION THERAPY: CPT

## 2017-04-20 ENCOUNTER — APPOINTMENT (OUTPATIENT)
Dept: PHYSICAL THERAPY | Facility: REHABILITATION | Age: 37
End: 2017-04-20
Payer: COMMERCIAL

## 2017-04-20 PROCEDURE — 97012 MECHANICAL TRACTION THERAPY: CPT

## 2017-04-20 PROCEDURE — 97140 MANUAL THERAPY 1/> REGIONS: CPT

## 2017-04-20 PROCEDURE — 97014 ELECTRIC STIMULATION THERAPY: CPT

## 2017-04-20 PROCEDURE — 97110 THERAPEUTIC EXERCISES: CPT

## 2017-04-24 ENCOUNTER — ALLSCRIPTS OFFICE VISIT (OUTPATIENT)
Dept: OTHER | Facility: OTHER | Age: 37
End: 2017-04-24

## 2017-04-24 ENCOUNTER — GENERIC CONVERSION - ENCOUNTER (OUTPATIENT)
Dept: OTHER | Facility: OTHER | Age: 37
End: 2017-04-24

## 2017-04-25 ENCOUNTER — APPOINTMENT (OUTPATIENT)
Dept: PHYSICAL THERAPY | Facility: REHABILITATION | Age: 37
End: 2017-04-25
Payer: COMMERCIAL

## 2017-04-25 PROCEDURE — 97164 PT RE-EVAL EST PLAN CARE: CPT

## 2017-04-25 PROCEDURE — 97140 MANUAL THERAPY 1/> REGIONS: CPT

## 2017-04-25 PROCEDURE — 97012 MECHANICAL TRACTION THERAPY: CPT

## 2017-04-25 PROCEDURE — 97110 THERAPEUTIC EXERCISES: CPT

## 2017-04-26 ENCOUNTER — GENERIC CONVERSION - ENCOUNTER (OUTPATIENT)
Dept: OTHER | Facility: OTHER | Age: 37
End: 2017-04-26

## 2017-04-28 ENCOUNTER — APPOINTMENT (OUTPATIENT)
Dept: PHYSICAL THERAPY | Facility: REHABILITATION | Age: 37
End: 2017-04-28
Payer: COMMERCIAL

## 2017-04-28 PROCEDURE — 97012 MECHANICAL TRACTION THERAPY: CPT

## 2017-04-28 PROCEDURE — 97140 MANUAL THERAPY 1/> REGIONS: CPT

## 2017-04-28 PROCEDURE — 97110 THERAPEUTIC EXERCISES: CPT

## 2017-05-01 ENCOUNTER — GENERIC CONVERSION - ENCOUNTER (OUTPATIENT)
Dept: OTHER | Facility: OTHER | Age: 37
End: 2017-05-01

## 2017-05-02 ENCOUNTER — APPOINTMENT (OUTPATIENT)
Dept: PHYSICAL THERAPY | Facility: REHABILITATION | Age: 37
End: 2017-05-02
Payer: COMMERCIAL

## 2017-05-02 PROCEDURE — 97012 MECHANICAL TRACTION THERAPY: CPT

## 2017-05-02 PROCEDURE — 97140 MANUAL THERAPY 1/> REGIONS: CPT

## 2017-05-02 PROCEDURE — 97110 THERAPEUTIC EXERCISES: CPT

## 2017-05-05 ENCOUNTER — APPOINTMENT (OUTPATIENT)
Dept: PHYSICAL THERAPY | Facility: REHABILITATION | Age: 37
End: 2017-05-05
Payer: COMMERCIAL

## 2017-05-05 PROCEDURE — 97014 ELECTRIC STIMULATION THERAPY: CPT

## 2017-05-05 PROCEDURE — 97140 MANUAL THERAPY 1/> REGIONS: CPT

## 2017-05-05 PROCEDURE — 97110 THERAPEUTIC EXERCISES: CPT

## 2017-05-05 PROCEDURE — 97012 MECHANICAL TRACTION THERAPY: CPT

## 2017-05-09 ENCOUNTER — APPOINTMENT (OUTPATIENT)
Dept: PHYSICAL THERAPY | Facility: REHABILITATION | Age: 37
End: 2017-05-09
Payer: COMMERCIAL

## 2017-05-09 PROCEDURE — 97140 MANUAL THERAPY 1/> REGIONS: CPT

## 2017-05-09 PROCEDURE — 97012 MECHANICAL TRACTION THERAPY: CPT

## 2017-05-09 PROCEDURE — 97110 THERAPEUTIC EXERCISES: CPT

## 2017-05-11 ENCOUNTER — ALLSCRIPTS OFFICE VISIT (OUTPATIENT)
Dept: OTHER | Facility: OTHER | Age: 37
End: 2017-05-11

## 2017-05-12 ENCOUNTER — APPOINTMENT (OUTPATIENT)
Dept: PHYSICAL THERAPY | Facility: REHABILITATION | Age: 37
End: 2017-05-12
Payer: COMMERCIAL

## 2017-05-12 PROCEDURE — 97012 MECHANICAL TRACTION THERAPY: CPT

## 2017-05-12 PROCEDURE — 97110 THERAPEUTIC EXERCISES: CPT

## 2017-05-12 PROCEDURE — 97140 MANUAL THERAPY 1/> REGIONS: CPT

## 2017-05-17 ENCOUNTER — APPOINTMENT (OUTPATIENT)
Dept: PHYSICAL THERAPY | Facility: REHABILITATION | Age: 37
End: 2017-05-17
Payer: COMMERCIAL

## 2017-05-17 PROCEDURE — 97014 ELECTRIC STIMULATION THERAPY: CPT

## 2017-05-17 PROCEDURE — 97110 THERAPEUTIC EXERCISES: CPT

## 2017-05-17 PROCEDURE — 97140 MANUAL THERAPY 1/> REGIONS: CPT

## 2017-05-17 PROCEDURE — 97012 MECHANICAL TRACTION THERAPY: CPT

## 2017-05-19 ENCOUNTER — GENERIC CONVERSION - ENCOUNTER (OUTPATIENT)
Dept: OTHER | Facility: OTHER | Age: 37
End: 2017-05-19

## 2017-05-19 ENCOUNTER — APPOINTMENT (OUTPATIENT)
Dept: PHYSICAL THERAPY | Facility: REHABILITATION | Age: 37
End: 2017-05-19
Payer: COMMERCIAL

## 2017-05-19 PROCEDURE — 97012 MECHANICAL TRACTION THERAPY: CPT

## 2017-05-19 PROCEDURE — 97110 THERAPEUTIC EXERCISES: CPT

## 2017-05-19 PROCEDURE — 97140 MANUAL THERAPY 1/> REGIONS: CPT

## 2017-05-19 PROCEDURE — 97164 PT RE-EVAL EST PLAN CARE: CPT

## 2017-05-22 ENCOUNTER — GENERIC CONVERSION - ENCOUNTER (OUTPATIENT)
Dept: OTHER | Facility: OTHER | Age: 37
End: 2017-05-22

## 2017-05-23 ENCOUNTER — APPOINTMENT (OUTPATIENT)
Dept: PHYSICAL THERAPY | Facility: REHABILITATION | Age: 37
End: 2017-05-23
Payer: COMMERCIAL

## 2017-05-25 ENCOUNTER — ALLSCRIPTS OFFICE VISIT (OUTPATIENT)
Dept: OTHER | Facility: OTHER | Age: 37
End: 2017-05-25

## 2017-05-26 ENCOUNTER — APPOINTMENT (OUTPATIENT)
Dept: PHYSICAL THERAPY | Facility: REHABILITATION | Age: 37
End: 2017-05-26
Payer: COMMERCIAL

## 2017-05-26 PROCEDURE — 97012 MECHANICAL TRACTION THERAPY: CPT | Performed by: PHYSICAL THERAPIST

## 2017-05-30 ENCOUNTER — GENERIC CONVERSION - ENCOUNTER (OUTPATIENT)
Dept: OTHER | Facility: OTHER | Age: 37
End: 2017-05-30

## 2017-06-01 ENCOUNTER — APPOINTMENT (OUTPATIENT)
Dept: PHYSICAL THERAPY | Facility: REHABILITATION | Age: 37
End: 2017-06-01

## 2017-06-01 PROCEDURE — 97012 MECHANICAL TRACTION THERAPY: CPT

## 2017-06-07 ENCOUNTER — ALLSCRIPTS OFFICE VISIT (OUTPATIENT)
Dept: RADIOLOGY | Facility: MEDICAL CENTER | Age: 37
End: 2017-06-07
Payer: COMMERCIAL

## 2017-06-09 ENCOUNTER — APPOINTMENT (OUTPATIENT)
Dept: PHYSICAL THERAPY | Facility: REHABILITATION | Age: 37
End: 2017-06-09

## 2017-06-09 PROCEDURE — 97012 MECHANICAL TRACTION THERAPY: CPT

## 2017-06-14 ENCOUNTER — GENERIC CONVERSION - ENCOUNTER (OUTPATIENT)
Dept: OTHER | Facility: OTHER | Age: 37
End: 2017-06-14

## 2017-06-16 ENCOUNTER — APPOINTMENT (OUTPATIENT)
Dept: PHYSICAL THERAPY | Facility: REHABILITATION | Age: 37
End: 2017-06-16

## 2017-06-16 PROCEDURE — 97012 MECHANICAL TRACTION THERAPY: CPT

## 2017-06-22 ENCOUNTER — APPOINTMENT (OUTPATIENT)
Dept: PHYSICAL THERAPY | Facility: REHABILITATION | Age: 37
End: 2017-06-22

## 2017-06-22 PROCEDURE — 97012 MECHANICAL TRACTION THERAPY: CPT

## 2017-06-29 ENCOUNTER — APPOINTMENT (OUTPATIENT)
Dept: PHYSICAL THERAPY | Facility: REHABILITATION | Age: 37
End: 2017-06-29

## 2017-06-29 PROCEDURE — 97012 MECHANICAL TRACTION THERAPY: CPT

## 2017-06-30 ENCOUNTER — APPOINTMENT (OUTPATIENT)
Dept: PHYSICAL THERAPY | Facility: REHABILITATION | Age: 37
End: 2017-06-30

## 2017-07-03 ENCOUNTER — APPOINTMENT (OUTPATIENT)
Dept: PHYSICAL THERAPY | Facility: REHABILITATION | Age: 37
End: 2017-07-03

## 2017-07-03 ENCOUNTER — GENERIC CONVERSION - ENCOUNTER (OUTPATIENT)
Dept: OTHER | Facility: OTHER | Age: 37
End: 2017-07-03

## 2017-07-05 ENCOUNTER — ALLSCRIPTS OFFICE VISIT (OUTPATIENT)
Dept: OTHER | Facility: OTHER | Age: 37
End: 2017-07-05

## 2017-07-06 ENCOUNTER — TRANSCRIBE ORDERS (OUTPATIENT)
Dept: ADMINISTRATIVE | Facility: HOSPITAL | Age: 37
End: 2017-07-06

## 2017-07-06 ENCOUNTER — ALLSCRIPTS OFFICE VISIT (OUTPATIENT)
Dept: OTHER | Facility: OTHER | Age: 37
End: 2017-07-06

## 2017-07-06 DIAGNOSIS — M48.061 NEURAL FORAMINAL STENOSIS OF LUMBAR SPINE: Primary | ICD-10-CM

## 2017-07-18 ENCOUNTER — GENERIC CONVERSION - ENCOUNTER (OUTPATIENT)
Dept: OTHER | Facility: OTHER | Age: 37
End: 2017-07-18

## 2017-07-24 ENCOUNTER — GENERIC CONVERSION - ENCOUNTER (OUTPATIENT)
Dept: OTHER | Facility: OTHER | Age: 37
End: 2017-07-24

## 2017-07-26 ENCOUNTER — OFFICE VISIT (OUTPATIENT)
Dept: RADIOLOGY | Facility: CLINIC | Age: 37
End: 2017-07-26
Payer: COMMERCIAL

## 2017-07-26 DIAGNOSIS — M48.061 NEURAL FORAMINAL STENOSIS OF LUMBAR SPINE: ICD-10-CM

## 2017-07-26 DIAGNOSIS — M54.16 RADICULOPATHY OF LUMBAR REGION: ICD-10-CM

## 2017-07-26 PROCEDURE — 95909 NRV CNDJ TST 5-6 STUDIES: CPT

## 2017-07-26 PROCEDURE — 95886 MUSC TEST DONE W/N TEST COMP: CPT

## 2017-08-03 ENCOUNTER — APPOINTMENT (OUTPATIENT)
Dept: PHYSICAL THERAPY | Facility: REHABILITATION | Age: 37
End: 2017-08-03

## 2017-08-03 DIAGNOSIS — M54.16 RADICULOPATHY OF LUMBAR REGION: ICD-10-CM

## 2017-08-03 PROCEDURE — 97110 THERAPEUTIC EXERCISES: CPT

## 2017-08-08 ENCOUNTER — APPOINTMENT (OUTPATIENT)
Dept: PHYSICAL THERAPY | Facility: REHABILITATION | Age: 37
End: 2017-08-08

## 2017-08-08 PROCEDURE — 97110 THERAPEUTIC EXERCISES: CPT

## 2017-08-10 ENCOUNTER — APPOINTMENT (OUTPATIENT)
Dept: PHYSICAL THERAPY | Facility: REHABILITATION | Age: 37
End: 2017-08-10

## 2017-08-10 ENCOUNTER — ALLSCRIPTS OFFICE VISIT (OUTPATIENT)
Dept: OTHER | Facility: OTHER | Age: 37
End: 2017-08-10

## 2017-08-11 ENCOUNTER — APPOINTMENT (OUTPATIENT)
Dept: PHYSICAL THERAPY | Facility: REHABILITATION | Age: 37
End: 2017-08-11

## 2017-08-11 ENCOUNTER — GENERIC CONVERSION - ENCOUNTER (OUTPATIENT)
Dept: OTHER | Facility: OTHER | Age: 37
End: 2017-08-11

## 2017-08-11 PROCEDURE — 97110 THERAPEUTIC EXERCISES: CPT

## 2017-08-15 ENCOUNTER — APPOINTMENT (OUTPATIENT)
Dept: PHYSICAL THERAPY | Facility: REHABILITATION | Age: 37
End: 2017-08-15

## 2017-08-15 PROCEDURE — 97110 THERAPEUTIC EXERCISES: CPT

## 2017-08-18 ENCOUNTER — APPOINTMENT (OUTPATIENT)
Dept: PHYSICAL THERAPY | Facility: REHABILITATION | Age: 37
End: 2017-08-18

## 2017-08-18 PROCEDURE — 97110 THERAPEUTIC EXERCISES: CPT

## 2017-08-22 ENCOUNTER — APPOINTMENT (OUTPATIENT)
Dept: PHYSICAL THERAPY | Facility: REHABILITATION | Age: 37
End: 2017-08-22

## 2017-08-22 PROCEDURE — 97110 THERAPEUTIC EXERCISES: CPT

## 2017-08-25 ENCOUNTER — APPOINTMENT (OUTPATIENT)
Dept: PHYSICAL THERAPY | Facility: REHABILITATION | Age: 37
End: 2017-08-25

## 2017-08-25 PROCEDURE — 97110 THERAPEUTIC EXERCISES: CPT

## 2017-08-29 ENCOUNTER — APPOINTMENT (OUTPATIENT)
Dept: PHYSICAL THERAPY | Facility: REHABILITATION | Age: 37
End: 2017-08-29

## 2017-08-29 PROCEDURE — 97110 THERAPEUTIC EXERCISES: CPT

## 2017-09-01 ENCOUNTER — APPOINTMENT (OUTPATIENT)
Dept: PHYSICAL THERAPY | Facility: REHABILITATION | Age: 37
End: 2017-09-01

## 2017-09-01 PROCEDURE — 97110 THERAPEUTIC EXERCISES: CPT

## 2017-09-05 ENCOUNTER — APPOINTMENT (OUTPATIENT)
Dept: PHYSICAL THERAPY | Facility: REHABILITATION | Age: 37
End: 2017-09-05

## 2017-09-07 ENCOUNTER — ALLSCRIPTS OFFICE VISIT (OUTPATIENT)
Dept: OTHER | Facility: OTHER | Age: 37
End: 2017-09-07

## 2017-09-07 ENCOUNTER — APPOINTMENT (OUTPATIENT)
Dept: PHYSICAL THERAPY | Facility: REHABILITATION | Age: 37
End: 2017-09-07

## 2017-09-08 ENCOUNTER — APPOINTMENT (OUTPATIENT)
Dept: PHYSICAL THERAPY | Facility: REHABILITATION | Age: 37
End: 2017-09-08

## 2017-09-08 ENCOUNTER — GENERIC CONVERSION - ENCOUNTER (OUTPATIENT)
Dept: OTHER | Facility: OTHER | Age: 37
End: 2017-09-08

## 2017-09-08 PROCEDURE — 97110 THERAPEUTIC EXERCISES: CPT

## 2017-09-12 ENCOUNTER — APPOINTMENT (OUTPATIENT)
Dept: PHYSICAL THERAPY | Facility: REHABILITATION | Age: 37
End: 2017-09-12

## 2017-09-12 PROCEDURE — 97140 MANUAL THERAPY 1/> REGIONS: CPT

## 2017-09-14 ENCOUNTER — GENERIC CONVERSION - ENCOUNTER (OUTPATIENT)
Dept: OTHER | Facility: OTHER | Age: 37
End: 2017-09-14

## 2017-09-14 ENCOUNTER — APPOINTMENT (OUTPATIENT)
Dept: PHYSICAL THERAPY | Facility: REHABILITATION | Age: 37
End: 2017-09-14

## 2017-09-14 DIAGNOSIS — E11.9 TYPE 2 DIABETES MELLITUS WITHOUT COMPLICATIONS (HCC): ICD-10-CM

## 2017-09-14 DIAGNOSIS — R03.0 ELEVATED BLOOD PRESSURE READING WITHOUT DIAGNOSIS OF HYPERTENSION: ICD-10-CM

## 2017-09-14 PROCEDURE — 97140 MANUAL THERAPY 1/> REGIONS: CPT

## 2017-09-15 ENCOUNTER — APPOINTMENT (OUTPATIENT)
Dept: PHYSICAL THERAPY | Facility: REHABILITATION | Age: 37
End: 2017-09-15

## 2017-09-18 ENCOUNTER — APPOINTMENT (OUTPATIENT)
Dept: PHYSICAL THERAPY | Facility: REHABILITATION | Age: 37
End: 2017-09-18

## 2017-09-18 PROCEDURE — 97140 MANUAL THERAPY 1/> REGIONS: CPT

## 2017-09-22 ENCOUNTER — APPOINTMENT (OUTPATIENT)
Dept: PHYSICAL THERAPY | Facility: REHABILITATION | Age: 37
End: 2017-09-22

## 2017-09-22 PROCEDURE — 97110 THERAPEUTIC EXERCISES: CPT

## 2017-09-25 ENCOUNTER — APPOINTMENT (OUTPATIENT)
Dept: PHYSICAL THERAPY | Facility: REHABILITATION | Age: 37
End: 2017-09-25

## 2017-09-26 ENCOUNTER — APPOINTMENT (OUTPATIENT)
Dept: PHYSICAL THERAPY | Facility: REHABILITATION | Age: 37
End: 2017-09-26

## 2017-09-26 PROCEDURE — 97110 THERAPEUTIC EXERCISES: CPT

## 2017-09-29 ENCOUNTER — APPOINTMENT (OUTPATIENT)
Dept: PHYSICAL THERAPY | Facility: REHABILITATION | Age: 37
End: 2017-09-29

## 2017-09-29 PROCEDURE — 97110 THERAPEUTIC EXERCISES: CPT

## 2017-10-02 ENCOUNTER — APPOINTMENT (OUTPATIENT)
Dept: PHYSICAL THERAPY | Facility: REHABILITATION | Age: 37
End: 2017-10-02

## 2017-10-02 PROCEDURE — 97012 MECHANICAL TRACTION THERAPY: CPT

## 2017-10-05 ENCOUNTER — ALLSCRIPTS OFFICE VISIT (OUTPATIENT)
Dept: OTHER | Facility: OTHER | Age: 37
End: 2017-10-05

## 2017-10-06 ENCOUNTER — APPOINTMENT (OUTPATIENT)
Dept: PHYSICAL THERAPY | Facility: REHABILITATION | Age: 37
End: 2017-10-06

## 2017-10-10 ENCOUNTER — GENERIC CONVERSION - ENCOUNTER (OUTPATIENT)
Dept: OTHER | Facility: OTHER | Age: 37
End: 2017-10-10

## 2017-10-10 ENCOUNTER — APPOINTMENT (OUTPATIENT)
Dept: PHYSICAL THERAPY | Facility: REHABILITATION | Age: 37
End: 2017-10-10

## 2017-10-10 PROCEDURE — 97012 MECHANICAL TRACTION THERAPY: CPT

## 2017-10-10 PROCEDURE — G8990 OTHER PT/OT CURRENT STATUS: HCPCS

## 2017-10-10 PROCEDURE — G8991 OTHER PT/OT GOAL STATUS: HCPCS

## 2017-10-13 ENCOUNTER — APPOINTMENT (OUTPATIENT)
Dept: PHYSICAL THERAPY | Facility: REHABILITATION | Age: 37
End: 2017-10-13

## 2017-10-13 PROCEDURE — 97012 MECHANICAL TRACTION THERAPY: CPT

## 2017-10-19 ENCOUNTER — APPOINTMENT (OUTPATIENT)
Dept: PHYSICAL THERAPY | Facility: REHABILITATION | Age: 37
End: 2017-10-19

## 2017-10-19 PROCEDURE — 97012 MECHANICAL TRACTION THERAPY: CPT

## 2017-10-20 ENCOUNTER — GENERIC CONVERSION - ENCOUNTER (OUTPATIENT)
Dept: OTHER | Facility: OTHER | Age: 37
End: 2017-10-20

## 2017-10-20 ENCOUNTER — APPOINTMENT (OUTPATIENT)
Dept: PHYSICAL THERAPY | Facility: REHABILITATION | Age: 37
End: 2017-10-20

## 2017-10-23 ENCOUNTER — GENERIC CONVERSION - ENCOUNTER (OUTPATIENT)
Dept: OTHER | Facility: OTHER | Age: 37
End: 2017-10-23

## 2017-10-24 ENCOUNTER — APPOINTMENT (OUTPATIENT)
Dept: PHYSICAL THERAPY | Facility: REHABILITATION | Age: 37
End: 2017-10-24

## 2017-10-24 PROCEDURE — 97012 MECHANICAL TRACTION THERAPY: CPT

## 2017-10-27 ENCOUNTER — APPOINTMENT (OUTPATIENT)
Dept: PHYSICAL THERAPY | Facility: REHABILITATION | Age: 37
End: 2017-10-27

## 2017-10-27 PROCEDURE — 97012 MECHANICAL TRACTION THERAPY: CPT

## 2017-10-31 ENCOUNTER — APPOINTMENT (OUTPATIENT)
Dept: PHYSICAL THERAPY | Facility: REHABILITATION | Age: 37
End: 2017-10-31

## 2017-10-31 PROCEDURE — 97012 MECHANICAL TRACTION THERAPY: CPT

## 2017-11-02 ENCOUNTER — ALLSCRIPTS OFFICE VISIT (OUTPATIENT)
Dept: OTHER | Facility: OTHER | Age: 37
End: 2017-11-02

## 2017-11-02 DIAGNOSIS — R29.898 OTHER SYMPTOMS AND SIGNS INVOLVING THE MUSCULOSKELETAL SYSTEM: ICD-10-CM

## 2017-12-14 ENCOUNTER — TRANSCRIBE ORDERS (OUTPATIENT)
Dept: ADMINISTRATIVE | Facility: HOSPITAL | Age: 37
End: 2017-12-14

## 2017-12-14 ENCOUNTER — ALLSCRIPTS OFFICE VISIT (OUTPATIENT)
Dept: OTHER | Facility: OTHER | Age: 37
End: 2017-12-14

## 2017-12-14 DIAGNOSIS — R29.898 DEFICIENCIES OF LIMBS: Primary | ICD-10-CM

## 2017-12-14 DIAGNOSIS — R29.898 OTHER SYMPTOMS AND SIGNS INVOLVING THE MUSCULOSKELETAL SYSTEM: ICD-10-CM

## 2017-12-28 ENCOUNTER — GENERIC CONVERSION - ENCOUNTER (OUTPATIENT)
Dept: OTHER | Facility: OTHER | Age: 37
End: 2017-12-28

## 2017-12-28 ENCOUNTER — GENERIC CONVERSION - ENCOUNTER (OUTPATIENT)
Dept: PAIN MEDICINE | Facility: CLINIC | Age: 37
End: 2017-12-28

## 2017-12-28 ENCOUNTER — HOSPITAL ENCOUNTER (OUTPATIENT)
Dept: MRI IMAGING | Facility: HOSPITAL | Age: 37
Discharge: HOME | End: 2017-12-28
Attending: PHYSICAL MEDICINE & REHABILITATION
Payer: COMMERCIAL

## 2017-12-28 DIAGNOSIS — R29.898 DEFICIENCIES OF LIMBS: ICD-10-CM

## 2017-12-28 PROCEDURE — 72148 MRI LUMBAR SPINE W/O DYE: CPT

## 2018-01-04 ENCOUNTER — ALLSCRIPTS OFFICE VISIT (OUTPATIENT)
Dept: OTHER | Facility: OTHER | Age: 38
End: 2018-01-04

## 2018-01-09 NOTE — MISCELLANEOUS
Message   Recorded as Task   Date: 04/25/2017 08:56 AM, Created By: [facility], [place]   Task Name: Miscellaneous   Assigned To: [id number] [place] [address] clinical,Team   Regarding Patient: Alexa Bower, Status: Active   CommentAlcidesdat Hernandezi - 25 Apr 2017 8:56 AM     TASK CREATED  Patient left a message with the answering service last night "NEEDS TO SPEAK BEFORE 5 PM/CALLED EARLIER/CB RIGHT AWAY RE PAPERWORK FOR FMLA-SHORT DISABILITY/COULD LOSE JOB" please call [phone]   Mariposa Thomason - 25 Apr 2017 9:17 AM     TASK REPLIED TO: Previously Assigned To [id number] [address] clinical,Team  Attempted to reach pt  at number provided above  Left detailed message, per release, advising that RN receiving message from answering service regarding FMLA/Short term disability paperwork  Pt  was advised that Parminder Murrieta is not in the office this week, will not be back until next Monday  Pt  was advised that it may be beneficial to reach out to PCP to see if they could fill out the paperwork for pt  Pt  was advised to c/b if any questions/concerns  Office number provided for c/b  Mariposa Thomason - 25 Apr 2017 9:17 AM     TASK IN PROGRESS   Dilcia Kendall - 25 Apr 2017 10:29 AM     TASK EDITED  Pt called back with questions regarding FMLA paperwork  Pls call pt at [phone]  Mariposa Thomason - 25 Apr 2017 11:17 AM     TASK REPLIED TO: Previously Assigned To [id number] [place] [address] clinical,Team  S/w pt  who states that he has a "dilemma " Per pt  he saw Dr Eder Baez yest  and was told that he can't do sx because sx would actually "set me back" further  Pt  states that when he saw Parminder Greenwoodnight 2 weeks ago, Parminder Greenwoodnight had mentioned that if sx is not an option, maybe an epidural inj  would be suitable  Pt  states that he does have an OV on 5/11 w/ JE, but "the epidural is something I need big time," so pt  is hoping that Parminder Lit can get him in for an inj  before his OV on 5/11   Pt  was advised Parminder Murrieta is not in the office this week, will need to be addressed upon his return  Pt  then states that during his OV w/ Dr Nisha Garcia, he was told that he needs 6 more weeks of FMLA/Short-term disability to recover  Pt  states that Dr Nisha Garcia told pt  he would personally contact JE and discuss the pt's situation  Pt  states that Dr Phoenix Negron office also informed him that someone from our office would be calling him yesterday regarding scheduling CHRISTIANO  Per pt  Dr Nisha Garcia advised that Brena Collar should be the one to fill out FMLA/Short-term disability paperwork  Pt  was advised again that Brena Collar is not in the office this week, cannot fill out the paperwork, and was also advised that even if Brena Collar was in the office, he normally refers FMLA/short-term disability to one of our network physiatrists  Pt  states that he did hear in RN's earlier message, that Brena Collar was out of the office this week  Pt  states that he did try calling yesterday several times, and s/w "Adrianna" who stated that she would send the message to our office  Pt  states that after calling three times, the last time he tried to contact our office he was told that the office was closed  Pt  states that he had to s/w Alice Alvarez , who advised pt  to s/w on-call provider  Pt  states that he s/w Dr Phoenix Negron PA, and pt  states that he was basically told that the process takes a few days and someone would c/b today 4/25  Pt  states that he also contacted his PCP's office yest  to discuss filling out FMLA/Short-term disability paperwork, per pt  Dr Chauncey Albert advised him that since he no longer treats pts pain symptoms, he cannot complete FMLA/Short-term disability paperwork  Pt  states that he also s/w FMLA office and representative advised him that they could go through a "loop hole" to give the pt  another day without his FMLA/Short-term disability paperwork  Pt  states that he is worried he is going to lose his job      Pt  was advised again that since Brena Collar is not here this week, and even one of our on-call providers would not be able to complete paperwork for pt  since they have never seen pt  in the office before  Pt  was advised he should reach out to Dr Grady Kiran office and Dr Jaime Hernandez office  Pt  was advised to relay to Dr Missy Mcdowell and Dr Maxx Anaya that Josiah Talib is not in the office this week, and see if one of them would be able to fill out FMLA/Short-term disability paperwork given the circumstances  Pt  verbalized understanding and was appreciative  Mariposa Thomason - 25 Apr 2017 11:17 AM     TASK IN PROGRESS   Mariposa Thomason - 26 Apr 2017 9:24 AM     TASK REPLIED TO: Previously Assigned To Sherin Cueva  ****FYI****   Mairposa Thomason - 26 Apr 2017 9:24 AM     TASK EDITED   Sherin Cueva - 01 May 2017 7:43 AM     TASK REPLIED TO: Previously Assigned To Sherin Cueva                      needs to see Dr Hugo Gunderson regarding disability paperwork status, will place referral now   Myrnada Palomares - 01 May 2017 9:54 AM     TASK EDITED  S/w pt regarding above  Per pt Dr Grady Kiran office never got back to him and he knew he had to wait for Josiah Mayers to get back to the office to be able to address his questions  Per pt the FMLA paperwork was faxed to the [facility] [address] office as well as Dr Grady Kiran office  Advised pt of above recommendations from Josiah Mayers and gave the ph # for Dr Hugo Gunderson  Advised pt that Dr Hugo Gunderson is a [facility] physician and will be able to view the referral   Pt appreciative of same  Active Problems    1  Acute back pain (724 5) (M54 9)   2  Acute right-sided low back pain with sciatica (724 2,724 3) (M54 40)   3  Central stenosis of spinal canal (724 00) (M48 00)   4  DM type 2 (diabetes mellitus, type 2) (250 00) (E11 9)   5  Elevated blood pressure reading (796 2) (R03 0)   6  Elevated hemoglobin (282 7) (D58 2)   7  Foraminal stenosis of lumbosacral region (724 02) (M99 83)   8  Lumbar radiculitis (724 4) (M54 16)   9  Lumbar spinal stenosis (724 02) (M48 06)   10  Morbid obesity (278 01) (E66 01)   11   Onychomycosis of multiple toenails with type 2 diabetes mellitus (250 80,110 1)    (E11 69,B35 1)   12  Positive depression screening (796 4) (R68 89)   13  Skin candidiasis (112 3) (B37 2)    Current Meds   1  Docusate Sodium 100 MG Oral Capsule; TAKE 1 CAPSULE TWICE DAILY; Therapy: (Recorded:07Mar2017) to Recorded   2  Gabapentin 300 MG Oral Capsule; TAKE 1 CAPSULE TWICE DAILY  Requested for:   65ZYW9082; Last Rx:05Apr2017 Ordered   3  Gabapentin 300 MG Oral Capsule; TAKE 2 CAPSULES 3 TIMES DAILY; Therapy: 13Apr2017 to (Evaluate:12Jun2017)  Requested for: 13Apr2017; Last   Rx:13Apr2017 Ordered   4  MetFORMIN HCl - 500 MG Oral Tablet; TAKE 1 TABLET TWICE DAILY; Therapy: 70NOC2793 to (Westfall Linker)  Requested for: 39VDH0996; Last   Rx:03Mar2017 Ordered   5  Oxycodone-Acetaminophen 7 5-325 MG Oral Tablet; Therapy: 13Apr2017 to Recorded   6  TraMADol HCl - 50 MG Oral Tablet; take 1 tablet every 12 hours as needed; Therapy: 38OPB7285 to (Evaluate:08Uoo4503); Last Rx:05Apr2017 Ordered    Allergies    1   No Known Drug Allergies    Signatures   Electronically signed by : Shemar Nieto RN; May  1 2017  9:55AM EST                       (Author)

## 2018-01-09 NOTE — MISCELLANEOUS
Message   Recorded as Task   Date: 07/17/2017 12:38 PM, Created By: Lilian Ivy   Task Name: Call Back   Assigned To: [id number] [address] clinical,Team   Regarding Patient: Magan Parra, Status: Active   CommentJulia Hernandez - 17 Jul 2017 12:38 PM     [address] [facility] [address]- Patient called requesting to s/w AO  Patient stated that he went bowling and feels off balance  Stated when he sits too long he gets stiff   patient also wants to know if he can go to PT    stated to c/b [phone] anytime between 12-4pm   [facility] - 17 Jul 2017 2:31 PM     TASK EDITED  S/W pt  Pt stated he had an epidural shot on 6/7/17 and AO said to C/B if had any problems  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 Pt stated he has soreness/stiffness pain in middle lower back at tailbone when stretching, trying to adjust/change positions  Pain is 2-3/10 w/ activity  He is taking Gabapentin 900mg 3x/day  Pharmacy on file  Pt stated he called PT and explained everything to her too  Please advise  *pt stated if C/B tomorrow to C/B after 12:30pm    [place] - 17 Jul 2017 2:55 PM     TASK REPLIED TO: Previously Assigned To [place]                      recommend he review this concern with Dr Candice Watkins - 18 Jul 2017 12:37 PM     TASK EDITED  S/W pt  Advised pt of the same  Pt appreciative  Active Problems    1  Acute back pain (724 5) (M54 9)   2  Acute right-sided low back pain with sciatica (724 2,724 3) (M54 40)   3  Central stenosis of spinal canal (724 00) (M48 00)   4  DM type 2 (diabetes mellitus, type 2) (250 00) (E11 9)   5  Elevated blood pressure reading (796 2) (R03 0)   6   Elevated hemoglobin (282 7) (D58 2)   7  Foraminal stenosis of lumbosacral region (724 02) (M99 83)   8  Gait disorder (781 2) (R26 9)   9  Lumbar radiculitis (724 4) (M54 16)   10  Lumbar spinal stenosis (724 02) (M48 06)   11  Morbid obesity (278 01) (E66 01)   12  Onychomycosis of multiple toenails with type 2 diabetes mellitus (250 80,110 1)    (E11 69,B35 1)   13  Positive depression screening (796 4) (Z13 89)   14  Skin candidiasis (112 3) (B37 2)    Current Meds   1  Gabapentin 300 MG Oral Capsule; TAKE 1 CAPSULE 3 TIMES DAILY; Therapy: 51RAU1159 to (Evaluate:66Jel2320)  Requested for: 54CJS1948; Last   Rx:69Nxt5356 Ordered   2  Gabapentin 600 MG Oral Tablet; TAKE 1 TABLET 3 TIMES DAILY; Therapy: 36UHF7207 to (Evaluate:83Gzr0463)  Requested for: 24UKY6770; Last   Rx:19Auv7634; Status: ACTIVE - Renewal Denied Ordered   3  MetFORMIN HCl - 500 MG Oral Tablet; TAKE 1 TABLET TWICE DAILY; Therapy: 16ENB3123 to (Evaluate:48Bta0959)  Requested for: 88DOK9818; Last   Rx:37Gbc4247 Ordered   4  Methocarbamol 500 MG Oral Tablet; TAKE 1 TABLET 3 TIMES DAILY  Requested for:   76MSF8188; Last Rx:56Bbh3674 Ordered   5  OneTouch Ultra 2 w/Device Kit; TEST ONE TIME DAILY; Therapy: 69CCV0744 to (Last Rx:70Btn8857) Ordered   6  OneTouch Ultra Blue In Vitro Strip; TEST ONCE DAILY; Therapy: 71TUT8562 to (Evaluate:31Chh6654)  Requested for: 64NAC1719; Last   Rx:13Zwv4473 Ordered    Allergies    1   No Known Drug Allergies    Signatures   Electronically signed by : Sam Mooney, ; Jul 18 2017 12:38PM EST                       (Author)

## 2018-01-10 NOTE — MISCELLANEOUS
Message   Recorded as Task   Date: 05/19/2017 10:41 AM, Created By: Letty Garza   Task Name: Miscellaneous   Assigned To: [id number] [place] [address] clinical,Team   Regarding Patient: Franklyn Beckford, Status: Active   Comment:    Letty Garza - 19 May 2017 10:41 Jaxson Key,    Patient came in today to pay for paper work that you had filled out  He also stated that there are more forms that needed to be completed before faxing completed forms  Please advise  Thank you   Laura Nikkie - 19 May 2017 11:13 AM     TASK REPLIED TO: Previously Assigned To Laura Nikkie                      I reviewed with him that I do not fill out disability paperwork and that would need to come from Dr Victoriano Calderon  I did the FMLA paperwork as requested  thanks   Mita Guan - 22 May 2017 10:10 AM     TASK EDITED  Please advise  Patient called today insisting that paperwork for [employer] be completed for his FMLA  patient stated he has copies of the forms that was sent to [place]  Stated there were separate forms that he needs in order to get paid  Stated that since you told him that he has to be out of work from may 12th-july 5th  that you have to fill out these forms and the he cannot wait for Dr Victoriano Calderon  Stated you should have these forms  Laura Lundy - 22 May 2017 10:16 AM     TASK REPLIED TO: Previously Assigned To [id number] [place] [address] clinical,Team                      as explained to the patient previously I will only complete the FMLA forms  Dr Victoriano Calderon will determine the rest   I will look to see if I missed any of the forms  Saqib Tyler - 22 May 2017 10:43 AM     TASK EDITED  [address] [place] [address]- S/w patient and relayed your message  Active Problems    1  Acute back pain (724 5) (M54 9)   2  Acute right-sided low back pain with sciatica (724 2,724 3) (M54 40)   3  Central stenosis of spinal canal (724 00) (M48 00)   4  DM type 2 (diabetes mellitus, type 2) (250 00) (E11 9)   5  Elevated blood pressure reading (796 2) (R03 0)   6  Elevated hemoglobin (282 7) (D58 2)   7  Foraminal stenosis of lumbosacral region (724 02) (M99 83)   8  Lumbar radiculitis (724 4) (M54 16)   9  Lumbar spinal stenosis (724 02) (M48 06)   10  Morbid obesity (278 01) (E66 01)   11  Onychomycosis of multiple toenails with type 2 diabetes mellitus (250 80,110 1)    (E11 69,B35 1)   12  Positive depression screening (796 4) (R68 89)   13  Skin candidiasis (112 3) (B37 2)    Current Meds   1  Docusate Sodium 100 MG Oral Capsule; TAKE 1 CAPSULE TWICE DAILY; Therapy: (Recorded:07Mar2017) to Recorded   2  Gabapentin 600 MG Oral Tablet; TAKE 1 TABLET 3 TIMES DAILY; Therapy: 27GFV0075 to (Evaluate:61Hoh0945)  Requested for: 84DEF9852; Last   Rx:11May2017 Ordered   3  MetFORMIN HCl - 500 MG Oral Tablet; TAKE 1 TABLET TWICE DAILY; Therapy: 55YYF8738 to (Toby Persons)  Requested for: 10MAU6013; Last   Rx:03Mar2017 Ordered   4  Methocarbamol 500 MG Oral Tablet; TAKE 1 TABLET 3 TIMES DAILY  Requested for:   71NKK0953; Last Rx:11May2017 Ordered   5  OneTouch Ultra 2 w/Device Kit; TEST ONE TIME DAILY; Therapy: 31CPE5596 to (Last Rx:15May2017) Ordered   6  OneTouch Ultra Blue In Vitro Strip; TEST ONCE DAILY; Therapy: 63LJX8686 to (Evaluate:61Bvu2514)  Requested for: 98WHU0635; Last   Rx:75Wfz9885 Ordered   7  TraMADol HCl - 50 MG Oral Tablet; take 1 tablet every 12 hours as needed; Therapy: 11IZK4273 to (Evaluate:51Vbr5581); Last Rx:05Apr2017 Ordered    Allergies    1   No Known Drug Allergies    Signatures   Electronically signed by : Kyung Leger, ; May 22 2017 10:47AM EST                       (Author)

## 2018-01-11 NOTE — MISCELLANEOUS
Message  4-24-17 755 pm  Pt had concern about his FMLA paperwork, would Shin Thakur or Dr Tariq Monk complete, concerned that he needed this for tomorrow  suggested he call office in AM to review status  Pt  sort of expressed undrstanding  Explained I have very little control over this at 7:55 pm at [employer], [place]      Signatures   Electronically signed by : Felicia Villalta, [facility];  Apr 24 2017  8:02PM EST                       (Author)

## 2018-01-11 NOTE — MISCELLANEOUS
Message   Recorded as Task   Date: 05/30/2017 01:35 PM, Created By: Anderson Mcclendon   Task Name: Follow Up   Assigned To: [address] [address] clinical,Team   Regarding Patient: Nelia Hull, Status: Active   Comment:    Myranda Palomares - 30 May 2017 1:35 PM     TASK CREATED  Caller: Self; General Medical Question; [phone] (Home)    ***FYI***  Caller transferred from [facility]   Pt stated taht he has an upcoming procedure with JE on 6/7 of a PEYTON L5 TFESI  Per pt he is very nervous and afraid of needles and would like to know if he can wear his ear buds and listen to music while he is getting the injection  Advised pt that he may do that to help him relax as long as he can hear and respond to Brena Collar  Pt appreciative of same  Stella Ayers - 30 May 2017 1:48 PM     TASK REPLIED TO: Previously Assigned To [address] [place] [address] clinical,Team                      yes agree that is fine        Active Problems    1  Acute back pain (724 5) (M54 9)   2  Acute right-sided low back pain with sciatica (724 2,724 3) (M54 40)   3  Central stenosis of spinal canal (724 00) (M48 00)   4  DM type 2 (diabetes mellitus, type 2) (250 00) (E11 9)   5  Elevated blood pressure reading (796 2) (R03 0)   6  Elevated hemoglobin (282 7) (D58 2)   7  Foraminal stenosis of lumbosacral region (724 02) (M99 83)   8  Gait disorder (781 2) (R26 9)   9  Lumbar radiculitis (724 4) (M54 16)   10  Lumbar spinal stenosis (724 02) (M48 06)   11  Morbid obesity (278 01) (E66 01)   12  Onychomycosis of multiple toenails with type 2 diabetes mellitus (250 80,110 1)    (E11 69,B35 1)   13  Positive depression screening (796 4) (R68 89)   14  Skin candidiasis (112 3) (B37 2)    Current Meds   1  Docusate Sodium 100 MG Oral Capsule; TAKE 1 CAPSULE TWICE DAILY; Therapy: (Recorded:07Mar2017) to Recorded   2  Gabapentin 600 MG Oral Tablet; TAKE 1 TABLET 3 TIMES DAILY;    Therapy: 00BEU1505 to (Evaluate:13Jny3994)  Requested for: 18VAB8465; Last Rx:11May2017 Ordered   3  MetFORMIN HCl - 500 MG Oral Tablet; TAKE 1 TABLET TWICE DAILY; Therapy: 34AJP6831 to (Chelsea Edith)  Requested for: 36MCV2121; Last   Rx:03Mar2017 Ordered   4  Methocarbamol 500 MG Oral Tablet; TAKE 1 TABLET 3 TIMES DAILY  Requested for:   01QTL2574; Last Rx:11May2017 Ordered   5  OneTouch Ultra 2 w/Device Kit; TEST ONE TIME DAILY; Therapy: 35BVU6832 to (Last Rx:15May2017) Ordered   6  OneTouch Ultra Blue In Vitro Strip; TEST ONCE DAILY; Therapy: 49ZNB6362 to (Evaluate:65Hfn1859)  Requested for: 63HFT2892; Last   Rx:15May2017 Ordered   7  TraMADol HCl - 50 MG Oral Tablet; take 1 tablet every 12 hours as needed; Therapy: 55SWK0608 to (Evaluate:44Xbn3069); Last Rx:05Apr2017 Ordered    Allergies    1   No Known Drug Allergies    Signatures   Electronically signed by : Osman Blanchard RN; May 30 2017  1:51PM EST                       (Author)

## 2018-01-11 NOTE — PROGRESS NOTES
Assessment    1  Central stenosis of spinal canal (724 00) (M48 00)   2  Foraminal stenosis of lumbosacral region (724 02) (M99 83)   3  Lumbar spinal stenosis (724 02) (M48 061)   4  Weakness of left lower extremity (729 89) (R29 898)    Plan  Lumbar radiculitis, Lumbar spinal stenosis    · Follow-up Visit in 4 Weeks Evaluation and Treatment  Follow-up  Status: Hold For -  Scheduling  Requested for: 05Oct2017  Weakness of left lower extremity    · Methocarbamol 750 MG Oral Tablet; TAKE 1 TO 2 TABLETS 3 TIMES DAILY AS  NEEDED    Discussion/Summary  reduce gabapentin to 600 mg twice a day  call for refills  Treatment plan includes physical therapy  Patient discussion: discussed with the patient  Chief Complaint  Disability eval from Pain Medicine  7/6 fu for same  8/10 follow up after RTW  9/7 fu after RTW  10/5      History of Present Illness  5/25 IE: 29 yo male with non-traumatic onset of severe LBP nd LE sensory disturbance[facility]   cauda equina ruled out by neurosurgery  Had CT / myelo done as could not complete MRI (advised to be done with anesthesia) currently in PTx by PCP and active treatment with Pain Medicine  LESI pending 1st week of [place]  On methocarbamol and gabapentin  Pattern is axial lumbar, numbness in sacral area, and numbness in left 1st two toes  Biggest problem is leg get weak with any prolonged ambulation  Goal in PT as stated is to toe stand  Some recent right buttock pain radiating to calf on the right w/in last week  Knees do not buckle, no foot drop or toe drag but legs feel "tired" and must use walker  He is reporting definite improvement  Has been suggested to have gastric bypass  Last worked March 1st 7/6 last PTx yesterday   feels improved with PTx and LESI   had one   feels "a syed better"   feel stronger in left ankle dorsiflexors  feels able to RTW  Able to do prolonged standing  8/10 has RTW w/o problem   in PTx and PM has started him on gabapentin  July 26 I had sent back to PTx due to left leg weakness and + EMG   no IE received yet  Unable to return to bowling  No falls, no tripping feels problem "controlling left foot"  C/O buttock pin with prolonged sitting  9/7 tolerating work   attends PTx in [place]   feels gabapentin not helpful (PM Rx ing)  10/5 follow up   still in PTx  Asks about continued numbness in left foot and gabapentin  is taking 600 tid  not sure if helpful  Review of Systems    Gastrointestinal: No complaints of abdominal pain, no constipation, no nausea or vomiting, no diarrhea or bloody stools  Genitourinary: No complaints of dysuria or incontinence, no hesitancy, no nocturia  Musculoskeletal: as noted in HPI  Neurological: numbness  Active Problems    1  Acute back pain (724 5) (M54 9)   2  Central stenosis of spinal canal (724 00) (M48 00)   3  Disability examination (V68 01) (Z02 71)   4  DM type 2 (diabetes mellitus, type 2) (250 00) (E11 9)   5  Elevated blood pressure reading (796 2) (R03 0)   6  Elevated hemoglobin (282 7) (D58 2)   7  Foraminal stenosis of lumbosacral region (724 02) (M99 83)   8  Gait disorder (781 2) (R26 9)   9  Intertrigo (695 89) (L30 4)   10  Lumbar radiculitis (724 4) (M54 16)   11  Lumbar spinal stenosis (724 02) (M48 061)   12  Morbid obesity (278 01) (E66 01)   13  Positive depression screening (796 4) (Z13 89)   14  Weakness of left lower extremity (729 89) (R29 898)    Past Medical History    1  History of Acute right-sided low back pain with sciatica (724 2,724 3) (M54 40)   2  History of Diabetes mellitus of other type with circulatory complication, without long-term   current use of insulin (250 70) (E13 59)   3  History of hearing loss (V12 49) (Z86 69)   4  History of Onychomycosis of multiple toenails with type 2 diabetes mellitus   (250 80,110 1) (E11 69,B35 1)   5   History of Skin candidiasis (112 3) (B37 2)    The active problems and past medical history were reviewed and updated today  Surgical History    1  History of Tonsillectomy With Adenoidectomy    The surgical history was reviewed and updated today  Family History  Mother    1  No pertinent family history  Grandfather    2  Family history of malignant neoplasm of stomach (V16 0) (Z80 0)    Social History    · Denied: History of Drug use   · Employed   · Lives with parents   · Never a smoker   · No illicit drug use   · Single   · Social alcohol use (Z78 9)  The social history was reviewed and is unchanged  Current Meds   1  Clotrimazole 1 % External Cream; apply sparingly to affected area(s) twice daily; Therapy: 15Rpl2260 to (Last Rx:70Gqb6599)  Requested for: 94Dyn7905 Ordered   2  Gabapentin 300 MG Oral Capsule; TAKE 1 CAPSULE 3 TIMES DAILY; Therapy: 29FBO0088 to (Evaluate:84Gxc1787)  Requested for: 13NRI0750; Last   Rx:13Ttq0633 Ordered   3  Gabapentin 600 MG Oral Tablet; TAKE 1 TABLET 3 TIMES DAILY; Therapy: 87TVV4753 to (Evaluate:85Zxn6021)  Requested for: 24KFV2759; Last   Rx:81Xer7605; Status: ACTIVE - Renewal Denied Ordered   4  MetFORMIN HCl - 500 MG Oral Tablet; TAKE 1 TABLET TWICE DAILY; Therapy: 29JDG2506 to (Evaluate:42Yeq9463)  Requested for: 66UWR0520; Last   Rx:16Wul6540 Ordered   5  Methocarbamol 750 MG Oral Tablet; TAKE 1 TO 2 TABLETS 3 TIMES DAILY AS NEEDED; Therapy: 95Jvj8940 to (21 )  Requested for: 04Dqa9350; Last   Rx:64Yuv4709 Ordered   6  OneTouch Ultra 2 w/Device Kit; TEST ONE TIME DAILY; Therapy: 66PZW2600 to (Last Rx:91Ecc3228) Ordered   7  OneTouch Ultra Blue In Vitro Strip; TEST ONCE DAILY; Therapy: 40TPM0232 to (Evaluate:84Szm3383)  Requested for: 76UDS2131; Last   Rx:26Ooy6641 Ordered    The medication list was reviewed and updated today  Allergies    1   No Known Drug Allergies    Vitals  Signs   Recorded: 38BHM1117 11:03AM   Heart Rate: 886  Systolic: 266  Diastolic: 72  Height: 6 ft   Weight: 406 lb   BMI Calculated: 55 06  BSA Calculated: 2 88    Physical Exam    Constitutional   General appearance: Abnormal   morbidly obese  Lumbar/Sacral Spine examination demonstrates  able to heel walk and toes walk, unablwe to single limb toe  the t[left but there is a contraction today  Results/Data    Diagnostic Review EMG + left L-5 / S-1 radic  PTx advised continued PTx 9/8  Provider Comments    Educated pt  to healing process of lumbar roots with diagrams   proximal to distal nature explained,      Signatures   Electronically signed by : Cynthia Green DO; Oct  5 2017 11:25AM EST                       (Author)

## 2018-01-12 VITALS
HEART RATE: 108 BPM | WEIGHT: 315 LBS | BODY MASS INDEX: 42.66 KG/M2 | DIASTOLIC BLOOD PRESSURE: 72 MMHG | HEIGHT: 72 IN | SYSTOLIC BLOOD PRESSURE: 138 MMHG

## 2018-01-12 VITALS
DIASTOLIC BLOOD PRESSURE: 74 MMHG | SYSTOLIC BLOOD PRESSURE: 118 MMHG | WEIGHT: 315 LBS | BODY MASS INDEX: 42.66 KG/M2 | HEIGHT: 72 IN | RESPIRATION RATE: 14 BRPM | HEART RATE: 84 BPM

## 2018-01-12 VITALS
BODY MASS INDEX: 42.66 KG/M2 | WEIGHT: 315 LBS | HEIGHT: 72 IN | SYSTOLIC BLOOD PRESSURE: 118 MMHG | DIASTOLIC BLOOD PRESSURE: 80 MMHG | OXYGEN SATURATION: 98 % | HEART RATE: 93 BPM | TEMPERATURE: 98.6 F

## 2018-01-12 VITALS
SYSTOLIC BLOOD PRESSURE: 110 MMHG | RESPIRATION RATE: 16 BRPM | DIASTOLIC BLOOD PRESSURE: 68 MMHG | HEIGHT: 72 IN | WEIGHT: 315 LBS | HEART RATE: 68 BPM | BODY MASS INDEX: 42.66 KG/M2

## 2018-01-12 NOTE — MISCELLANEOUS
Assessment    1  Skin candidiasis (112 3) (B37 2)   2  Elevated hemoglobin (282 7) (D58 2)   3  Acute back pain (724 5) (M54 9)   4  DM type 2 (diabetes mellitus, type 2) (250 00) (E11 9)   5  Elevated blood pressure reading (796 2) (R03 0)   6  Morbid obesity (278 01) (E66 01)    Plan  Elevated hemoglobin    · *Polysomnography, Sleep Study, Diagnostic; Status:Need Information - Financial  Authorization; Requested for:10Mar2017;    Perform:[facility]; Due:10Mar2018; Ordered;  For:Elevated hemoglobin; Ordered By:Nataliya Colunga;  there any other medical conditions or medications that would explain these      symptoms? : No  is the sleep disturbance affecting the patient's ability to function? : no  History/Symptoms: : snoring  Study Only or Consult : Sleep Study and Consult and F/U with Sleep Specialist  Skin candidiasis    · Fluconazole 200 MG Oral Tablet (Diflucan); take 1 tablet daily for 5 days   Rx By: Sean Zhao; Dispense: 0 Days ; #:5 Tablet; Refill: 0; For: Skin candidiasis; SASKIA = N; Verified Transmission to [id number] Last Updated By: System, SureScript[employer]; 3/10/2017 9:57:24 AM   · Ketoconazole 2 % External Cream; APPLY A THIN LAYER TO AFFECTED AREA(S)  TWICE DAILY   Rx By: Sean Zhao; Dispense: 90 Days ; #:1 X 60 GM Tube; Refill: 0; For: Skin candidiasis; SASKIA = N; Verified Transmission to [id number] Last Updated By: System, SureScripts; 3/10/2017 9:57:25 AM    Discussion/Summary  Discussion Summary:   30year old male    1  Acute back pain 2/2 lumbar stenosis: cauda equina was ruled out in hospital  Pain is improved  continue prednisone taper, tramadol, robaxin, gabapentin  Pt has not used percocet yet  continue physical therapy twice a wee  Pt wants FMLA filled out for short term disability with his back pain and requring walker  Pt works as a dealer at [employer]   However, with improvement compared to last week and improvement with PT, will hold on filling out paperwork until pt return in 1 week to see progress and how long pt will need to be off work  encourage weight loss  Pt to schedule appt with neurosurgeon in 1 month  2  type 2 diabetes: A1c 6 5%  continue metformin  encourage weight loss and healthy diet    3  Elevated BP without diagnosis of HTN: BP elevated last week likely 2/2 pain  BP improve  4  MOrbid obesity: encourage weight loss, healthy diet  5  Elevated H/H with elevated bicarb: likely component of sleep apnea  pt snores  order sleep study    6  Skin candidiasis: order diflucan and ketoconazole cream      7  Constipation: improved  Last BM yesterday  continue colace  Likely will go back to regular cycle with improvement of pain and mobility     8  Followup 1 week to address FMLA paperword for short term disability  Out of work note provided until next visit  Patient's Capacity to Self-Care: Patient is able to Self-Care  Counseling Documentation With Imm: The patient was counseled regarding instructions for management, importance of compliance with treatment  Medication SE Review and Pt Understands Tx: Possible side effects of new medications were reviewed with the patient/guardian today  The treatment plan was reviewed with the patient/guardian  The patient/guardian understands and agrees with the treatment plan      Chief Complaint  Chief Complaint Free Text Note Form: pt is here for JOSE D QUINTEROS  pt was hospitalized at [facility] on 3/3/2017 and discharged 3/6/2017  Diagnoses; Right-sided low back pain with sciatica, DM, morbic obesity  pt was discharged to home  pt states he has a rash on back that he was told he had at hospital but no itching or hurting  pt says he put icy hot on it so not sure if he had a reaction  pt has paper work for [facility]      History of Present Illness  [employer]: The patient is being contacted for follow-up after hospitalization and RENE scheduled 3/10/17 @ 9:30 AM with Carol Ann Salas in [place]  He was hospitalized at [facility]   The date of admission: 3/3/17, date of discharge: 3/6/17  Diagnosis: acute right-sided low back pain w/ sciatica and paresthesia, DM@, morbid obesity  He was discharged to home  Medications reviewed and updated today  He scheduled a follow up appointment  Follow-up appointments with other specialists: none  The patient is currently experiencing symptoms  numbness to left foot/ankle, tingling to right foot, back pain Counseling was provided to the patient  Communication performed and completed by sam   HPI: 30year old male with history of morbid obesity, type 2 diabetes presenting today for hospital followup  Pt was admitted on 3/3/17 to 3/6/17 for acute back pain r/o cauda equina  CT lumbar spine and myelogram revealed high grade stenosis of L4-L5 and significant foraminal stenosis of L5-S1  Neurosurgery was consulted and reports no surgical intervention  MRI unable to be done due to morbid obesity and pain  Pt did not meet criteria for cauda equina  Pt was discharge home on steroid taper, PT/OT, robaxin, and gabapentin  pt had physical therapy on Tuesday and today  Pt will be going twice a week  Pt is using a walker due to pain  Pt reports improved mobility with physical therapy  Pt will have followup with neurosurgery  in a month  Pain medication pt was discharged improved  Pain currently improved from 8/10 to 4/10  Sharp shooting pain resolved  Numbness in left foot and right toes still present  Pt does have symptoms of sleep apnea with snoring, however, no daytime hypersomnlence  Pt trying to lose weight  Last bowel movement yesterday  urinating ok  no saddle anesthesia    Pt still on prednisone         Review of Systems  Complete-Male:   Constitutional: no fever, not feeling poorly, no chills and not feeling tired  Eyes: no eye pain, no eyesight problems, eyes not red and no purulent discharge from the eyes  ENT: no earache, no nosebleeds, no sore throat, no hearing loss and no nasal discharge  Cardiovascular: no chest pain, no intermittent leg claudication, no palpitations and no extremity edema  Respiratory: no shortness of breath, no cough, no orthopnea, no wheezing and no shortness of breath during exertion  Gastrointestinal: no abdominal pain, no nausea, no vomiting, no constipation, no diarrhea and no blood in stools  Genitourinary: no dysuria  Musculoskeletal: no arthralgias, no joint swelling, no limb pain, no myalgias and no joint stiffness  Integumentary: a rash and RAsh on back not itchy and not bothering pt  noticed by ed physician  no new detergent, shampoo  Just icy hot placed there, but no itching and no skin lesions  Neurological: no headache, no numbness, no tingling, no confusion, no dizziness, no convulsions and no fainting  Psychiatric: not suicidal, no anxiety and no depression  Hematologic/Lymphatic: no tendency for easy bleeding and no tendency for easy bruising  ROS Reviewed:   ROS reviewed  Active Problems    1  Acute back pain (724 5) (M54 9)   2  Diabetes mellitus screening (V77 1) (Z13 1)   3  DM type 2 (diabetes mellitus, type 2) (250 00) (E11 9)   4  Elevated blood pressure reading (796 2) (R03 0)   5  Morbid obesity (278 01) (E66 01)    Past Medical History    1  History of hearing loss (V12 49) (Z86 69)    Surgical History    1  History of Tonsillectomy With Adenoidectomy  Surgical History Reviewed: The surgical history was reviewed and updated today  Family History  Grandfather    1  Family history of malignant neoplasm of stomach (V16 0) (Z80 0)  Family History Reviewed: The family history was reviewed and updated today  Social History    · Never a smoker   · No illicit drug use   · Social alcohol use (Z78 9)  Social History Reviewed: The social history was reviewed and updated today  The social history was reviewed and is unchanged  Current Meds   1  Docusate Sodium 100 MG Oral Capsule; TAKE 1 CAPSULE TWICE DAILY;    Therapy: (Recorded:07Mar2017) to Recorded   2  Gabapentin 300 MG Oral Capsule; TAKE 1 CAPSULE TWICE DAILY; Therapy: (Recorded:07Mar2017) to Recorded   3  MetFORMIN HCl - 500 MG Oral Tablet; TAKE 1 TABLET TWICE DAILY; Therapy: 79JKO7104 to (Stacia Moritz)  Requested for: 18VWW9082; Last   Rx:03Mar2017 Ordered   4  Methocarbamol 500 MG Oral Tablet; TAKE 1 TABLET 3 times daily PRN musle spasm; Therapy: 88UYC4435 to (Evaluate:43Nnz0839)  Requested for: 52ZMP6753; Last   Rx:02Mar2017 Ordered   5  Methocarbamol 750 MG Oral Tablet; TAKE 1 TABLET 3 TIMES DAILY; Therapy: (21 265 454 ) to Recorded   6  Motrin 400 MG TABS; TAKE 1 TABLET 3 TIMES DAILY AS NEEDED; Therapy: (Recorded:02Mar2017) to Recorded   7  Oxycodone-Acetaminophen 7 5-325 MG Oral Tablet; TAKE 1 TABLET EVERY 6 HOURS   AS NEEDED FOR PAIN;   Therapy: (Recorded:07Mar2017) to Recorded   8  PredniSONE 10 MG Oral Tablet; TAKE 1 TABLET DAILY; Therapy: (21 265 ) to Recorded   9  PredniSONE 20 MG Oral Tablet; TAKE 2 TABLETS DAILY WITH FOOD; Therapy: 70KGV2597 to (Evaluate:10Mar2017)  Requested for: 47WEV1298; Last   Rx:03Mar2017 Ordered   10  TraMADol HCl - 50 MG Oral Tablet; take 1 tablet every 12 hours as needed; Therapy: 02JWH1327 to (Evaluate:01Apr2017); Last Rx:02Mar2017 Ordered    Allergies    1  No Known Drug Allergies    Vitals  Signs   Recorded: 62LMO5767 09:07AM   Temperature: 97 3 F, Tympanic  Heart Rate: 89  Systolic: 471, LUE, Sitting  Diastolic: 88, LUE, Sitting  Height: 6 ft   Weight: 404 lb   BMI Calculated: 54 79  BSA Calculated: 2 87  O2 Saturation: 98, RA    Physical Exam    Constitutional   General appearance: No acute distress, well appearing and well nourished  well developed, morbidly obese and well hydrated  Eyes   Pupils and irises: Equal, round and reactive to light  Pupils: equal, round, and reactive to light bilaterally  Cornea, Lens, and Sclera:   Ears, Nose, Mouth, and Throat   Oropharynx: Normal with no erythema, edema, exudate or lesions  The posterior pharynx was not erythematous and did not have an exudate  Pulmonary   Respiratory effort: No increased work of breathing or signs of respiratory distress  Respiratory rate: normal  Assessment of respiratory effort revealed normal rhythm and effort  Auscultation of lungs: Clear to auscultation, equal breath sounds bilaterally, no wheezes, no rales, no rhonci  no rales or crackles were heard bilaterally  no rhonchi  no friction rub  no wheezing  no diminished breath sounds  no bronchial breath sounds  Cardiovascular   Auscultation of heart: Normal rate and rhythm, normal S1 and S2, without murmurs  The heart rate was normal  The rhythm was regular  Heart sounds: normal S1 and normal S2  no murmurs were heard  Examination of extremities for edema and/or varicosities: Normal   no edema  Abdomen   Abdomen: Non-tender, no masses  Bowel sounds were normal  The abdomen was soft and nontender  Lymphatic   Palpation of lymph nodes in neck: No lymphadenopathy  no anterior cervical node enlargement, no posterior cervical node enlargement, no submandibular node enlargement and no supraclavicular node enlargement  Musculoskeletal walks with a walker due to pain  Inspection/palpation of joints, bones, and muscles: Normal     Skin scaly rash in right lumbar and sacral area  Psychiatric   Orientation to person, place and time: Normal   Mental Status: oriented to person, place, and time, oriented to person, oriented to place and oriented to time  Mood and affect: Normal   Mood and Affect: appropriate mood and appropriate affect  Additional Exam:  5/5 motor strength b/l le  limited flexion of lumbar back and limited extension  normal sidebending and rotation; no saddle anesthesia          Future Appointments    Date/Time Provider Specialty Site   03/14/2017 11:00 AM Grazyna Gallagher, [address] [facility]     Signatures Electronically signed by : Ayla Quintanilla, ; Mar  7 2017  1:34PM EST                       (Co-author)    Electronically signed by : Darleen Bassett DO; Mar 10 2017 10:05AM EST                       (Author)    Electronically signed by : Darleen Bassett DO; Mar 10 2017 10:42AM EST                       (Author)    Electronically signed by : Lashae Duncan MD; Mar 10 2017  2:24PM EST

## 2018-01-13 VITALS
OXYGEN SATURATION: 99 % | DIASTOLIC BLOOD PRESSURE: 98 MMHG | SYSTOLIC BLOOD PRESSURE: 150 MMHG | HEART RATE: 76 BPM | TEMPERATURE: 97.8 F

## 2018-01-13 VITALS
HEIGHT: 72 IN | DIASTOLIC BLOOD PRESSURE: 88 MMHG | OXYGEN SATURATION: 98 % | BODY MASS INDEX: 42.66 KG/M2 | SYSTOLIC BLOOD PRESSURE: 130 MMHG | HEART RATE: 89 BPM | TEMPERATURE: 97.3 F | WEIGHT: 315 LBS

## 2018-01-13 NOTE — PROGRESS NOTES
Assessment   1  Acute back pain (724 5) (M54 9)  2  Lumbar spinal stenosis (724 02) (M48 06)  3  Lumbar radiculitis (724 4) (M54 16)  4  Foraminal stenosis of lumbosacral region (724 02) (M99 83)    Plan    · Follow-up PRN Evaluation and Treatment  Follow-up  Status: Complete  Done:  19JKU9238 01:44PM  Ordered; For: Foraminal stenosis of lumbosacral region, Lumbar radiculitis, Lumbar   spinal stenosis; Ordered By: Rosita Milligan  Performed:     Due: 80VAU8202    Discussion/Summary    Mr Megan Riley has improved significant with conservative treatment  today we reviewed those options including PT, Kristin and medications  I explained to him that surgery would not guarantee him satisfactory results and would incur very high risk of complications, pablo in the setting of BMI 55 and noncompliant diabetes  I asked him to continue with pt and to fu with Dr Princess Hebert  I will see him on a PRN basis  The patient has the current Goals: Reduce pain  The patent has the current Barriers: None  Patient is able to Self-Care  Self Referrals: No Dr Lit Schwartz      Chief Complaint   1  Back Pain  hospital follow up  Morbid obesity BMI 55 with back pain in the setting of spinal degeneration  significant improvement with medication and PT  Followed by Dr Princess Hebert  Currently ambulating with walker  History of Present Illness    ESTEBAN Spencer presents with complaints of sudden onset of constant episodes of mild bilateral lower back pain, radiating to the right buttock, bilateral lower leg and bilateral foot  On a scale of 1 to 10, the patient rates the pain as 2  The symptoms resulted from a bending over motion  Episodes started about 2 months ago  Symptoms are unchanged     Associated symptoms include spasm and stiffness, but no fever, no night sweats, no abdominal pain, no general malaise, no weight loss, no arm numbness, no arm weakness, no urinary incontinence, no fecal incontinence, no urinary retention and no rash localized to the area of pain    leg numbness (bilateral LE numbness R>L)  leg weakness (bilateral LE weakness)  Review of Systems    Constitutional: no fever and no chills  Eyes: no eyesight problems  ENT: no hearing loss  Cardiovascular: no chest pain and no palpitations  Respiratory: shortness of breath and wheezing, but no cough  Gastrointestinal: no nausea  Genitourinary: no incontinence  Musculoskeletal: limb pain (bilateral buttock and lower leg pain R>L) and joint stiffness (lower back stiffness), but as noted in HPI, no joint swelling, no myalgias and no limb swelling    The patient presents with complaints of sudden onset of mild lower back arthralgias  Neurological: numbness (bilateral LE numbness R>L), limb weakness (bilateral LE weakness) and difficulty walking (uses a walker), but no headache, no tingling, no confusion, no dizziness, no convulsions and no fainting  Psychiatric: no anxiety and no depression  Endocrine: no muscle weakness  Hematologic/Lymphatic: no tendency for easy bleeding and no tendency for easy bruising  Active Problems   1  Acute back pain (724 5) (M54 9)  2  Acute right-sided low back pain with sciatica (724 2,724 3) (M54 40)  3  Central stenosis of spinal canal (724 00) (M48 00)  4  DM type 2 (diabetes mellitus, type 2) (250 00) (E11 9)  5  Elevated blood pressure reading (796 2) (R03 0)  6  Elevated hemoglobin (282 7) (D58 2)  7  Foraminal stenosis of lumbosacral region (724 02) (M99 83)  8  Lumbar radiculitis (724 4) (M54 16)  9  Lumbar spinal stenosis (724 02) (M48 06)  10  Morbid obesity (278 01) (E66 01)  11  Onychomycosis of multiple toenails with type 2 diabetes mellitus (250 80,110 1)    (E11 69,B35 1)  12  Positive depression screening (796 4) (R68 89)  13  Skin candidiasis (112 3) (B37 2)    Past Medical History   1   History of Diabetes mellitus of other type with circulatory complication, without long-term   current use of insulin (250 70) (E13 59)  2  History of hearing loss (V12 49) (Z86 69)    Surgical History   1  History of Tonsillectomy With Adenoidectomy    Family History  Mother   1  No pertinent family history  Grandfather   2  Family history of malignant neoplasm of stomach (V16 0) (Z80 0)    Social History    · Employed   · Lives with parents   · Never a smoker   · No illicit drug use   · Single   · Social alcohol use (Z78 9)    Current Meds  1  Docusate Sodium 100 MG Oral Capsule; TAKE 1 CAPSULE TWICE DAILY; Therapy: (Recorded:07Mar2017) to Recorded  2  Gabapentin 300 MG Oral Capsule; TAKE 1 CAPSULE TWICE DAILY  Requested for:   27ERA4819; Last Rx:05Apr2017 Ordered  3  Gabapentin 300 MG Oral Capsule; TAKE 2 CAPSULES 3 TIMES DAILY; Therapy: 13Apr2017 to (Evaluate:12Jun2017)  Requested for: 13Apr2017; Last   Rx:13Apr2017 Ordered  4  MetFORMIN HCl - 500 MG Oral Tablet; TAKE 1 TABLET TWICE DAILY; Therapy: 62ADN8735 to (Ashu Loyola)  Requested for: 71WPG0690; Last   Rx:03Mar2017 Ordered  5  Oxycodone-Acetaminophen 7 5-325 MG Oral Tablet; Therapy: 13Apr2017 to Recorded  6  TraMADol HCl - 50 MG Oral Tablet; take 1 tablet every 12 hours as needed; Therapy: 90TAS9512 to (Evaluate:31Qfg3157); Last Rx:05Apr2017 Ordered    Allergies   1  No Known Drug Allergies    Vitals  Vital Signs    Recorded: 24Apr2017 10:51AM   Temperature 98 7 F   Heart Rate 100   Respiration 16   Systolic 795   Diastolic 84   Height 6 ft    Weight 405 lb    BMI Calculated 54 93   BSA Calculated 2 88     Physical Exam   (grade 5/5 LE strength, grossly intact sensation and DTR, walks with walker, negative SLR)   Constitutional Patient appears healthy and well developed  No signs of acute distress present  Eyes Vision is grossly normal  Discs flat with sharp margins  Respiratory Auscultation of lungs: Clear to auscultation bilaterally  Cardiovascular Auscultation of heart: Rate is regular  Rhythm is regular  No heart murmur appreciated      Carotid pulses: 2+ and equal bilaterally, without bruits  Peripheral vascular exam: Pedal Pulses: 2+ and equal bilaterally  Radial pulses: 2+ and equal bilaterally  Extremities: No edema of the lower limbs bilaterally  Abdomen The abdomen is flat  No abdominal scars  Abdomen is soft, nontender, and nondistended  Anus, perineum, and rectum: Exam deferred  Neurologic - Mental Status: Alert and Oriented x3  Mood and affect: Affect is normal   Memory is grossly intact  Attention is WNL  Speech is articulated and fluent  Knowledge and vocabulary consistent with education  Cranial Nerve Exam:  2nd cranial nerve: Visual field was full to confrontation  3rd, 4th, and 6th cranial nerves: Normal with no deficit  5th cranial nerve: Sensation was intact in all three divisions to light touch and temperature  Motor function was intact  7th cranial nerve: Face symmetrical at grimace and at rest  8th cranial nerve: Grossly intact to finger rub bilaterally  9th and 10th cranial nerves: Uvula is midline  11th cranial nerve: Shoulder shrug equal bilaterally  12th cranial nerve: Tongue mideline, no atrophy present  Motor System General Motor Strength: No pronator drift and no parietal drift  Motor System - Upper Extremities: Normal to inspection and palpation  Strength: Deltoids 5/5 bilaterally  Biceps 5/5 bilaterally  Triceps 5/5 bilaterally  Extensor carpi radials is 5/5 bilaterally  Extensor digitorum 5/5 bilaterally  Intrinsic 5/5 bilaterally   5/5 bilaterally  Muscle tone: Normal bilaterally  Muscle Bulk: Normal bilaterally  Motor System - Lower Extremities: Normal to inspection and palpation  Strength: Iliopsoas 5/5 bilaterally  Quadriceps 5/5 bilaterally  Hamstrings 5/5 bilaterally  Gastrocnemius 5/5 bilaterally  Muscle tone: Normal bilaterally  Reflexes: Biceps reflexes are 2+ bilaterally  Triceps reflexes are 2+ bilaterally  Achilles reflexes are 2+ bilaterally  Babinski's reflex is 2+ down going bilaterally  Ankle clonus is absent bilaterally  Coordination: Finger to nose was normal    Sensory: Sensation grossly intact to light touch  Sensation grossly intact to light touch  Gait and Station: [place] with a normal gait  Results/Data  Diagnostic Studies Reviewed [facility]:   CT Scan Review moderate L4/5 stenosis with central and foraminal involvement, image quality marginal due to habitus  Health Management  DM type 2 (diabetes mellitus, type 2)   *VB - Foot Exam; every 1 year; Last 39KEV7208; Next Due: 21Mar2018; Active  Positive depression screening   PHevery-9 Adult Depression Screening; every 1 year; Last 79YNB5041; Next Due: 21Mar2018;   Active    Future Appointments    Date/Time Provider Specialty Site   07/25/2017 02:15 PM Kamar Luu MD Internal Medicine [facility]   05/11/2017 08:00 AM Marcos Lazo DO Pain Management [facility] SPINE     Signatures   Electronically signed by : KACY Dsouza ; Apr 24 2017  1:46PM EST                       (Author)    Electronically signed by : KACY Dsouza ; Apr 25 2017  8:46PM EST                       (Author)

## 2018-01-13 NOTE — MISCELLANEOUS
Message   Recorded as Task   Date: 07/24/2017 09:17 AM, Created By: Rob Lawrence   Task Name: Care Coordination   Assigned To: [id number] [address] clinical,Team   Regarding Patient: Roderick Juarez, Status: Active   Comment:    Ninfa Lopez - 24 Jul 2017 9:17 AM     TASK CREATED  received answering service message "PT NEEDS PAIN MEDICINE/NOT SURE WHO TO SPK WITH ABOUT THIS/ PLS CB"  Please call pt at [phone]  Thank you  Myranda Palomares - 24 Jul 2017 10:24 AM     TASK EDITED  ***FYI***    S/w pt regarding above  Per pt he received a message this AM on his phone and wanted to know if it was from [address]  Per pt it had to do with an EMG  Advised pt that when I looked through his task list it seems that he was left a message from jeffrey Culver from Dr [facility] [employer] office, regarding his EMG  Pt aware and will call Dr [employer] office back for more information regarding same  Pt then stated that he is still ahving "soreness" in his low back that does dissipate after he stands and stretches  Per pt he is taking gabapentin and also OTC ibuprofen and tylenol and also rests his back when gets too sore  Per pt when he took the [facility] he noted that he did not have nay of this "soreness "  Per pt he does not have any methocarbamol left  Pt then stated that his pain is not "ll that bad" and just more "annoying "      RN advised pt that if his pain is only a 1-2/10 and it is relieved with stretching that he should continue with the medication that he is currently taking and go to have his EMG so that he may start his PT/excersise program as per Dr Whittington Screws noted in task  started on 7/19 with his office  Pt verbalized understanding of all information and was going to call Dr [employer] office today      ***Advised pt to keep taking the medication that he is currently on along with OTC ibuprofen and tylenol and rest***   Marco Dey - 24 Jul 2017 10:40 AM     TASK REPLIED TO: Previously Assigned To Didier Gallego                      agree with recs        Active Problems    1  Acute back pain (724 5) (M54 9)   2  Acute right-sided low back pain with sciatica (724 2,724 3) (M54 40)   3  Central stenosis of spinal canal (724 00) (M48 00)   4  DM type 2 (diabetes mellitus, type 2) (250 00) (E11 9)   5  Elevated blood pressure reading (796 2) (R03 0)   6  Elevated hemoglobin (282 7) (D58 2)   7  Foraminal stenosis of lumbosacral region (724 02) (M99 83)   8  Gait disorder (781 2) (R26 9)   9  Lumbar radiculitis (724 4) (M54 16)   10  Lumbar spinal stenosis (724 02) (M48 06)   11  Morbid obesity (278 01) (E66 01)   12  Onychomycosis of multiple toenails with type 2 diabetes mellitus (250 80,110 1)    (E11 69,B35 1)   13  Positive depression screening (796 4) (Z13 89)   14  Skin candidiasis (112 3) (B37 2)    Current Meds   1  Gabapentin 300 MG Oral Capsule; TAKE 1 CAPSULE 3 TIMES DAILY; Therapy: 81BLF9240 to (Evaluate:34Eba1276)  Requested for: 08QVQ2215; Last   Rx:22Bya0371 Ordered   2  Gabapentin 600 MG Oral Tablet; TAKE 1 TABLET 3 TIMES DAILY; Therapy: 50NTE0431 to (Evaluate:98Ich6322)  Requested for: 53FMU6323; Last   Rx:09Zjg8179; Status: ACTIVE - Renewal Denied Ordered   3  MetFORMIN HCl - 500 MG Oral Tablet; TAKE 1 TABLET TWICE DAILY; Therapy: 08MAS2740 to (Evaluate:14Nov2017)  Requested for: 88RLR1382; Last   Rx:03Stg5383 Ordered   4  Methocarbamol 500 MG Oral Tablet; TAKE 1 TABLET 3 TIMES DAILY  Requested for:   24SYZ0679; Last Rx:80Qla9562 Ordered   5  OneTouch Ultra 2 w/Device Kit; TEST ONE TIME DAILY; Therapy: 80DXA9596 to (Last Rx:96Xxt5840) Ordered   6  OneTouch Ultra Blue In Vitro Strip; TEST ONCE DAILY; Therapy: 32TZR2761 to (Evaluate:14Uge8493)  Requested for: 79RXK6235; Last   Rx:39Qph7014 Ordered    Allergies    1   No Known Drug Allergies    Signatures   Electronically signed by : Francoise Levy RN; Jul 24 2017 12:24PM EST                       (Author)

## 2018-01-14 VITALS
TEMPERATURE: 98.4 F | OXYGEN SATURATION: 92 % | BODY MASS INDEX: 42.66 KG/M2 | WEIGHT: 315 LBS | SYSTOLIC BLOOD PRESSURE: 126 MMHG | HEIGHT: 72 IN | DIASTOLIC BLOOD PRESSURE: 86 MMHG | HEART RATE: 96 BPM

## 2018-01-14 VITALS
SYSTOLIC BLOOD PRESSURE: 118 MMHG | BODY MASS INDEX: 42.66 KG/M2 | WEIGHT: 315 LBS | TEMPERATURE: 98.8 F | HEIGHT: 72 IN | HEART RATE: 97 BPM | DIASTOLIC BLOOD PRESSURE: 79 MMHG

## 2018-01-15 VITALS
HEIGHT: 72 IN | WEIGHT: 315 LBS | SYSTOLIC BLOOD PRESSURE: 140 MMHG | DIASTOLIC BLOOD PRESSURE: 80 MMHG | BODY MASS INDEX: 42.66 KG/M2 | HEART RATE: 92 BPM

## 2018-01-15 VITALS
HEART RATE: 67 BPM | WEIGHT: 315 LBS | HEIGHT: 73 IN | OXYGEN SATURATION: 98 % | BODY MASS INDEX: 41.75 KG/M2 | TEMPERATURE: 98.2 F | SYSTOLIC BLOOD PRESSURE: 150 MMHG | DIASTOLIC BLOOD PRESSURE: 108 MMHG

## 2018-01-15 NOTE — CONSULTS
Assessment    1  Central stenosis of spinal canal (724 00) (M48 00)   2  Foraminal stenosis of lumbosacral region (724 02) (M99 83)   3  Lumbar spinal stenosis (724 02) (M48 06)   4  Gait disorder (781 2) (R26 9)    Plan  Lumbar spinal stenosis    · Follow-up visit in 6 weeks Evaluation and Treatment  Follow-up  Status: Hold For -  Scheduling  Requested for: 19ACX4678    Discussion/Summary  Impression: low back pain, radiculopathy, spinal stenosis, degenerative disc disease of the lumbar spine and osteoarthritis of the lumbar spine  Currently, the condition is improving  There are no changes in medication management  Continue with Pain Medicine treatment  Treatment plan includes activity modification  Patient discussion: discussed with the patient, Unable to work in any capacity  Chief Complaint  Disability eval from Pain Medicine  History of Present Illness  5/25 IE: 31 yo male with non-traumatic onset of severe LBP nd LE sensory disturbance, admitted [facility] March 3 thru 6th   cauda equina ruled out by neurosurgery  Had CT / myelo done as could not complete MRI (advised to be done with anesthesia) currently in PTx by PCP and active treatment with Pain Medicine  LESI pending 1st week of June  On methocarbamol and gabapentin  Pattern is axial lumbar, numbness in sacral area, and numbness in left 1st two toes  Biggest problem is leg get weak with any prolonged ambulation  Goal in PT as stated is to toe stand  Some recent right buttock pain radiating to calf on the right w/in last week  Knees do not buckle, no foot drop or toe drag but legs feel "tired" and must use walker  He is reporting definite improvement  Has been suggested to have gastric bypass  Last worked March 1st       Review of Systems    Constitutional: recent weight gain and recent 14 lb weight loss  Musculoskeletal: as noted in HPI  Neurological: as noted in HPI  ROS reviewed  Active Problems    1   Acute back pain (724 5) (M54 9)   2  Acute right-sided low back pain with sciatica (724 2,724 3) (M54 40)   3  Central stenosis of spinal canal (724 00) (M48 00)   4  DM type 2 (diabetes mellitus, type 2) (250 00) (E11 9)   5  Elevated blood pressure reading (796 2) (R03 0)   6  Elevated hemoglobin (282 7) (D58 2)   7  Foraminal stenosis of lumbosacral region (724 02) (M99 83)   8  Lumbar radiculitis (724 4) (M54 16)   9  Lumbar spinal stenosis (724 02) (M48 06)   10  Morbid obesity (278 01) (E66 01)   11  Onychomycosis of multiple toenails with type 2 diabetes mellitus (250 80,110 1)    (E11 69,B35 1)   12  Positive depression screening (796 4) (R68 89)   13  Skin candidiasis (112 3) (B37 2)    Past Medical History    1  History of Diabetes mellitus of other type with circulatory complication, without long-term   current use of insulin (250 70) (E13 59)   2  History of hearing loss (V12 49) (Z86 69)    The active problems and past medical history were reviewed and updated today  Surgical History    1  History of Tonsillectomy With Adenoidectomy    The surgical history was reviewed and updated today  Family History  Mother    1  No pertinent family history  Grandfather    2  Family history of malignant neoplasm of stomach (V16 0) (Z80 0)    The family history was reviewed and updated today  Social History    · Denied: History of Drug use   · Employed   · Lives with parents   · Never a smoker   · No illicit drug use   · Single   · Social alcohol use (Z78 9)  The social history was reviewed and is unchanged  Current Meds   1  Docusate Sodium 100 MG Oral Capsule; TAKE 1 CAPSULE TWICE DAILY; Therapy: (Recorded:07Mar2017) to Recorded   2  Gabapentin 600 MG Oral Tablet; TAKE 1 TABLET 3 TIMES DAILY; Therapy: 04OQY4030 to (Evaluate:10Svp9152)  Requested for: 33UAU3051; Last   Rx:69Fhr5345 Ordered   3  MetFORMIN HCl - 500 MG Oral Tablet; TAKE 1 TABLET TWICE DAILY;    Therapy: 82PGE6956 to (Evaluate:69Eoj4468)  Requested for: 86CPB8933; Last   Rx:03Mar2017 Ordered   4  Methocarbamol 500 MG Oral Tablet; TAKE 1 TABLET 3 TIMES DAILY  Requested for:   88ZOI5966; Last Rx:11May2017 Ordered   5  OneTouch Ultra 2 w/Device Kit; TEST ONE TIME DAILY; Therapy: 72LPO4270 to (Last Rx:15May2017) Ordered   6  OneTouch Ultra Blue In Vitro Strip; TEST ONCE DAILY; Therapy: 85SKZ5813 to (Evaluate:29Iqf7435)  Requested for: 47HZW0626; Last   Rx:15May2017 Ordered   7  TraMADol HCl - 50 MG Oral Tablet; take 1 tablet every 12 hours as needed; Therapy: 22YWY3807 to (Evaluate:05May2017); Last Rx:05Apr2017 Ordered    The medication list was reviewed and updated today  Allergies    1  No Known Drug Allergies    Vitals  Signs   Recorded: 05EMQ5573 02:21PM   Heart Rate: 92  Systolic: 826  Diastolic: 80  Height: 6 ft   Weight: 404 lb 2 08 oz  BMI Calculated: 54 81  BSA Calculated: 2 87    Physical Exam    Lumbosacral Spine: Special Tests: negative Straight Leg Raise  Constitutional - General appearance: Abnormal  morbidly obese  Musculoskeletal - Gait and station: Abnormal  Gait evaluation demonstrated using RM but no abnormal foot or ankle motion  no LOB or knee buckling  Digits and nails: Normal  Inspection/palpation of joints, bones, and muscles: Normal  Muscle strength/tone: Normal    Neurologic - Cranial nerves: Normal  Reflexes: Abnormal  Deep tendon reflexes: 1+ right biceps, 1+ left biceps, 1+ right triceps, 1+ left triceps, 1+ right brachioradialis, 1+ left brachioradialis, 0 right patella, 0 left patella, 0 right ankle jerk and 0 left ankle jerkno ankle clonus on the right and no ankle clonus on the left  Lower extremity peripheral neuro exam: Abnormal  Distal pulse examination findings: able to stand on heels and toes but not walk  Results/Data    CT Scan Review L-4/5 moderate stenosis, facet OA  MRI Review not completed  Provider Comments    Onset approx  2/2/17  Liliana Yanez nontraumatic  PCP sent to PM, PTx and NS 4/5  NS saw 4/24 and no surgery due to conservative improvement and high risk  Unable to complete MRI  CT and myelogram with congenital stenosis and DDD / facet OA        Future Appointments    Date/Time Provider Specialty Site   07/25/2017 02:15 PM Gray Denton MD Internal Medicine [facility]   06/07/2017 10:15 AM Pinky Rasheed DO Pain Management [facility] OUTPATIENT   06/15/2017 08:15 AM Pinky Rasheed DO Pain Management [facility] SPINE   07/05/2017 10:30 AM SRUTHI Fry [facility]     Signatures   Electronically signed by : Amisha Harvey DO; May 25 2017  2:59PM EST                       (Author)

## 2018-01-15 NOTE — RESULT NOTES
Message   Recorded as Task   Date: 06/14/2017 12:50 PM, Created By: Narinder Lugo   Task Name: Follow Up   Assigned To: [id number] [address] end procedure,Team   Regarding Patient: Alexa Bower, Status: Active   CommentTerrace Bones - 14 Jun 2017 12:50 PM     TASK CREATED  Pt  is S/P B/L L5 TFESI on 6/07 by Dr Tisha Garcia  F/U is on 7/06 w/AO  Narinder Lugo - 14 Jun 2017 1:49 PM     TASK EDITED  Pt  reports 70% relief post inj  Pt  states still has numbness in toes and buttocks, also his sciatic is still going  F/U is on 7/06 w/AO     Lv Cotton - 14 Jun 2017 1:54 PM     TASK REPLIED TO: Previously Assigned To Lv Cotton                      aware, agree        Signatures   Electronically signed by : Ruthann Ruiz, ; Jun 14 2017  2:29PM EST                       (Author)

## 2018-01-16 NOTE — PROGRESS NOTES
Assessment    1  Lumbar radiculitis (724 4) (M54 16)   2  Lumbar spinal stenosis (724 02) (M48 061)   3  Weakness of left lower extremity (729 89) (R29 898)    Plan  Weakness of left lower extremity    · Follow-up visit in 6 weeks Evaluation and Treatment  Follow-up  Status: Hold For -  Scheduling  Requested for: 90KDU8714   · *1 - [facility] [facility]  May start Fitness Program  for continued  LE strengthening if independent and taper formal PTx  Status:  Active  Requested for: 24WNZ1186  Care Summary provided  : Yes    Discussion/Summary  The patient has the current Goals: Get stronger  The patent has the current Barriers: Weight  Patient is able to Self-Care  Impression: radiculopathy  Currently, the condition is improving  Treatment plan includes may do "Fitness Program"  Patient discussion: discussed with the patient, chiropractor OK  Chief Complaint  Disability eval from Pain Medicine  7/6 fu for same  8/10 follow up after RTW  9/7 fu after RTW  10/5 fu for left leg weakness  11/2 fu for RTW issues  History of Present Illness  5/25 IE: 31 yo male with non-traumatic onset of severe LBP nd LE sensory disturbance, admitted [facility] March 3 thru 6th   cauda equina ruled out by neurosurgery  Had CT / myelo done as could not complete MRI (advised to be done with anesthesia) currently in PTx by PCP and active treatment with Pain Medicine  LESI pending 1st week of June  On methocarbamol and gabapentin  Pattern is axial lumbar, numbness in sacral area, and numbness in left 1st two toes  Biggest problem is leg get weak with any prolonged ambulation  Goal in PT as stated is to toe stand  Some recent right buttock pain radiating to calf on the right w/in last week  Knees do not buckle, no foot drop or toe drag but legs feel "tired" and must use walker  He is reporting definite improvement  Has been suggested to have gastric bypass   Last worked March 1st 7/6 last PTx yesterday   feels improved with PTx and ABILIO   had one   feels "a syed better"   feel stronger in left ankle dorsiflexors  feels able to RTW  Able to do prolonged standing  8/10 has RTW w/o problem   in PTx and PM has started him on gabapentin  July 26 I had sent back to PTx due to left leg weakness and + EMG   no IE received yet  Unable to return to bowling  No falls, no tripping feels problem "controlling left foot"  C/O buttock pin with prolonged sitting  9/7 tolerating work   attends PTx in [place]   feels gabapentin not helpful (PM Rx ing)  10/5 follow up   still in PTx  Asks about continued numbness in left foot and gabapentin  is taking 600 tid  not sure if helpful  Has weaned off gabapentin   on only metformin and methocarbamol  Wants to do fitness program at [facility] only due to $ 60 / week copay  Feels he is getting stronger  Asks abouit choropractor  Review of Systems    Gastrointestinal: No complaints of abdominal pain, no constipation, no nausea or vomiting, no diarrhea or bloody stools  Genitourinary: No complaints of dysuria or incontinence, no hesitancy, no nocturia  Musculoskeletal: as noted in HPI  Neurological: numbness  Active Problems    1  Acute back pain (724 5) (M54 9)   2  Central stenosis of spinal canal (724 00) (M48 00)   3  Disability examination (V68 01) (Z02 71)   4  DM type 2 (diabetes mellitus, type 2) (250 00) (E11 9)   5  Elevated blood pressure reading (796 2) (R03 0)   6  Elevated hemoglobin (282 7) (D58 2)   7  Foraminal stenosis of lumbosacral region (724 02) (M99 83)   8  Gait disorder (781 2) (R26 9)   9  Intertrigo (695 89) (L30 4)   10  Lumbar radiculitis (724 4) (M54 16)   11  Lumbar spinal stenosis (724 02) (M48 061)   12  Morbid obesity (278 01) (E66 01)   13  Positive depression screening (796 4) (Z13 89)   14  Weakness of left lower extremity (729 89) (R29 898)    Past Medical History    1   History of Acute right-sided low back pain with sciatica (724 2,724 3) (M54 40)   2  History of Diabetes mellitus of other type with circulatory complication, without long-term   current use of insulin (250 70) (E13 59)   3  History of hearing loss (V12 49) (Z86 69)   4  History of Onychomycosis of multiple toenails with type 2 diabetes mellitus   (250 80,110 1) (E11 69,B35 1)   5  History of Skin candidiasis (112 3) (B37 2)    The active problems and past medical history were reviewed and updated today  Surgical History    1  History of Tonsillectomy With Adenoidectomy    The surgical history was reviewed and updated today  Family History  Mother    1  No pertinent family history  Grandfather    2  Family history of malignant neoplasm of stomach (V16 0) (Z80 0)    The family history was reviewed and updated today  Social History    · Denied: History of Drug use   · Employed   · Lives with parents   · Never a smoker   · No illicit drug use   · Single   · Social alcohol use (Z78 9)  The social history was reviewed and is unchanged  Current Meds   1  Advil TABS; Therapy: (Recorded:02Nov2017) to Recorded   2  Clotrimazole 1 % External Cream; apply sparingly to affected area(s) twice daily; Therapy: 47Ndk8764 to (Last Rx:25Dle8559)  Requested for: 11Wpv0657 Ordered   3  MetFORMIN HCl - 500 MG Oral Tablet; TAKE 1 TABLET TWICE DAILY; Therapy: 37HDF1023 to (Evaluate:14Nov2017)  Requested for: 82OXO9663; Last   Rx:32Nzg6321 Ordered   4  Methocarbamol 750 MG Oral Tablet; TAKE 1 TO 2 TABLETS 3 TIMES DAILY AS NEEDED; Therapy: 00Mxy5172 to (Evaluate:25Nov2017)  Requested for: 05Oct2017; Last   Rx:05Oct2017 Ordered   5  OneTouch Ultra 2 w/Device Kit; TEST ONE TIME DAILY; Therapy: 77ZRC9495 to (Last Rx:17Chv0094) Ordered   6  OneTouch Ultra Blue In Vitro Strip; TEST ONCE DAILY; Therapy: 47HDM7120 to (Evaluate:28Mqt2091)  Requested for: 63ZGP5051; Last   Rx:18Zxo3133 Ordered    The medication list was reviewed and updated today  Allergies    1   No Known Drug Allergies    Vitals  Signs   Recorded: 21MXY6250 09:58AM   Heart Rate: 88  Systolic: 225  Diastolic: 76  Height: 6 ft   Weight: 398 lb   BMI Calculated: 53 98  BSA Calculated: 2 85    Physical Exam    Constitutional   General appearance: Abnormal   obese  Lumbar/Sacral Spine examination demonstrates  still globally hyporeflexic bvut able to stand on left toe now  Results/Data    Diagnostic Review last PT note 10/10 [facility] forms endorsed        Signatures   Electronically signed by : Isacc Starkey DO; Nov 2 2017 10:24AM EST                       (Author)

## 2018-01-17 NOTE — PROGRESS NOTES
Assessment    1  DM type 2 (diabetes mellitus, type 2) (250 00) (E11 9)   2  Lumbar radiculitis (724 4) (M54 16)   3  Weakness of left lower extremity (729 89) (R29 898)    Plan  Weakness of left lower extremity    · Follow-up Visit in 4 Weeks Evaluation and Treatment  Follow-up  Status: Hold For -  Scheduling  Requested for: 10Aug2017    Discussion/Summary  Impression: radiculopathy  Currently, the condition is unchanged  There are no changes in medication management  Treatment plan includes physical therapy  Patient discussion: discussed with the patient  Chief Complaint  Disability eval from Pain Medicine  7/6 fu for same  8/10 follow up after RTW      History of Present Illness  5/25 IE: 31 yo male with non-traumatic onset of severe LBP nd LE sensory disturbance, admitted [facility] March 3 thru 6th   cauda equina ruled out by neurosurgery  Had CT / myelo done as could not complete MRI (advised to be done with anesthesia) currently in PTx by PCP and active treatment with Pain Medicine  LESI pending 1st week of June  On methocarbamol and gabapentin  Pattern is axial lumbar, numbness in sacral area, and numbness in left 1st two toes  Biggest problem is leg get weak with any prolonged ambulation  Goal in PT as stated is to toe stand  Some recent right buttock pain radiating to calf on the right w/in last week  Knees do not buckle, no foot drop or toe drag but legs feel "tired" and must use walker  He is reporting definite improvement  Has been suggested to have gastric bypass  Last worked March 1st 7/6 last PTx yesterday   feels improved with PTx and LESI   had one   feels "a syed better"   feel stronger in left ankle dorsiflexors  feels able to RTW  Able to do prolonged standing  8/10 has RTW w/o problem   in PTx and PM has started him on gabapentin  July 26 I had sent back to PTx due to left leg weakness and + EMG   no IE received yet  Unable to return to bowling   No falls, no tripping feels problem "controlling left foot"  C/O buttock pin with prolonged sitting  Review of Systems    Gastrointestinal: No complaints of abdominal pain, no constipation, no nausea or vomiting, no diarrhea or bloody stools  Genitourinary: No complaints of dysuria or incontinence, no hesitancy, no nocturia  Musculoskeletal: as noted in HPI  Neurological: weakness left leg  and numbness, but No complaints of headache, no confusion, no numbness or tingling, no dizziness  ROS reviewed  Active Problems    1  Acute back pain (724 5) (M54 9)   2  Acute right-sided low back pain with sciatica (724 2,724 3) (M54 40)   3  Central stenosis of spinal canal (724 00) (M48 00)   4  DM type 2 (diabetes mellitus, type 2) (250 00) (E11 9)   5  Elevated blood pressure reading (796 2) (R03 0)   6  Elevated hemoglobin (282 7) (D58 2)   7  Foraminal stenosis of lumbosacral region (724 02) (M99 83)   8  Gait disorder (781 2) (R26 9)   9  Lumbar radiculitis (724 4) (M54 16)   10  Lumbar spinal stenosis (724 02) (M48 06)   11  Morbid obesity (278 01) (E66 01)   12  Onychomycosis of multiple toenails with type 2 diabetes mellitus (250 80,110 1)    (E11 69,B35 1)   13  Positive depression screening (796 4) (Z13 89)   14  Skin candidiasis (112 3) (B37 2)    Past Medical History    1  History of Diabetes mellitus of other type with circulatory complication, without long-term   current use of insulin (250 70) (E13 59)   2  History of hearing loss (V12 49) (Z86 69)    The active problems and past medical history were reviewed and updated today  Surgical History    1  History of Tonsillectomy With Adenoidectomy    The surgical history was reviewed and updated today  Family History  Mother    1  No pertinent family history  Grandfather    2   Family history of malignant neoplasm of stomach (V16 0) (Z80 0)    Social History    · Denied: History of Drug use   · Employed   · Lives with parents   · Never a smoker   · No illicit drug use · Single   · Social alcohol use (Z78 9)    Current Meds   1  Gabapentin 300 MG Oral Capsule; TAKE 1 CAPSULE 3 TIMES DAILY; Therapy: 66VYF2920 to (Evaluate:94Hrc1271)  Requested for: 53HNA3813; Last   Rx:56Uba0094 Ordered   2  Gabapentin 600 MG Oral Tablet; TAKE 1 TABLET 3 TIMES DAILY; Therapy: 01LHX1347 to (Evaluate:24Fky7033)  Requested for: 86RJA8209; Last   Rx:40Plj5927; Status: ACTIVE - Renewal Denied Ordered   3  MetFORMIN HCl - 500 MG Oral Tablet; TAKE 1 TABLET TWICE DAILY; Therapy: 80USI0240 to (Evaluate:14Nov2017)  Requested for: 56KNC3424; Last   Rx:21Qsf5894 Ordered   4  OneTouch Ultra 2 w/Device Kit; TEST ONE TIME DAILY; Therapy: 53ROU0004 to (Last Rx:29Tfu6554) Ordered   5  OneTouch Ultra Blue In Vitro Strip; TEST ONCE DAILY; Therapy: 25YQJ6998 to (Evaluate:94Raa4838)  Requested for: 67RIT1483; Last   Rx:17Yeo5112 Ordered    Allergies    1  No Known Drug Allergies    Vitals  Signs   Recorded: 10Aug2017 09:48AM   Heart Rate: 76  Systolic: 282  Diastolic: 84  Height: 6 ft   Weight: 404 lb   BMI Calculated: 54 79  BSA Calculated: 2 87    Physical Exam    Constitutional   General appearance: Abnormal   morbidly obese  Lumbar/Sacral Spine examination demonstrates Lumbosacral Spine:   Evaluation of Muscle Stretch Reflexes on the right side demonstrates 0/4 Hamstring Reflex, 0/4 Knee Jerk Reflex and 0/4 Ankle Jerk Reflex, but negative right ankle clonus  Evaluation of Muscle Stretch Reflexes on the left side demonstrates 0/4 Hamstring Reflex, 0/4 Knee Jerk Reflex, 0/4 Ankle Jerk Reflex and negative left ankle clonus  Special Tests: negative Straight Leg Raise on right and negative Straight Leg Raise on left  Results/Data    Diagnostic Review EDX + for left L-5/ S-1 radic and large fiber polyneuropathy  No PT Ie  Provider Comments    Does NOT appear to require LLE bracing at this time  [facility] I am awaiting PTx IE from Aug 3rd        Future Appointments    Date/Time Provider Specialty Site   09/11/2017 04:00 PM Margarito Garcia MD Internal Medicine [facility]     Signatures   Electronically signed by : Mian Martinez DO; Aug 10 2017 10:07AM EST                       (Author)

## 2018-01-17 NOTE — PROGRESS NOTES
Assessment    1  Foraminal stenosis of lumbosacral region (724 02) (M99 83)   2  Lumbar radiculitis (724 4) (M54 16)   3  Weakness of left lower extremity (729 89) (R29 898)   4  Disability examination (V68 01) (Z02 71)    Plan  Disability examination    · Follow-up Visit in 4 Weeks Evaluation and Treatment  Follow-up  Status: Hold For -  Scheduling  Requested for: 06Gfz1995  Weakness of left lower extremity    · Methocarbamol 750 MG Oral Tablet; TAKE 1 TO 2 TABLETS 3 TIMES DAILY AS  NEEDED    Discussion/Summary  Impression: low back pain  Medication changes are as documented in orders  Treatment plan includes physical therapy  Patient discussion: discussed with the patient  Chief Complaint  Disability eval from Pain Medicine  7/6 fu for same  8/10 follow up after RTW  9/7 fu after RTW      History of Present Illness  5/25 IE: 31 yo male with non-traumatic onset of severe LBP nd LE sensory disturbance, admitted [facility] March 3 thru 6th   cauda equina ruled out by neurosurgery  Had CT / myelo done as could not complete MRI (advised to be done with anesthesia) currently in PTx by PCP and active treatment with Pain Medicine  LESI pending 1st week of June  On methocarbamol and gabapentin  Pattern is axial lumbar, numbness in sacral area, and numbness in left 1st two toes  Biggest problem is leg get weak with any prolonged ambulation  Goal in PT as stated is to toe stand  Some recent right buttock pain radiating to calf on the right w/in last week  Knees do not buckle, no foot drop or toe drag but legs feel "tired" and must use walker  He is reporting definite improvement  Has been suggested to have gastric bypass  Last worked March 1st 7/6 last PTx yesterday   feels improved with PTx and LESI   had one   feels "a syed better"   feel stronger in left ankle dorsiflexors  feels able to RTW  Able to do prolonged standing  8/10 has RTW w/o problem   in PTx and PM has started him on gabapentin   July 26 I had sent back to PTx due to left leg weakness and + EMG   no IE received yet  Unable to return to bowling  No falls, no tripping feels problem "controlling left foot"  C/O buttock pin with prolonged sitting  9/7 tolerating work   attends PTx in [place]   feels gabapentin not helpful (PM Rx ing)      Active Problems    1  Acute back pain (724 5) (M54 9)   2  Acute right-sided low back pain with sciatica (724 2,724 3) (M54 40)   3  Central stenosis of spinal canal (724 00) (M48 00)   4  DM type 2 (diabetes mellitus, type 2) (250 00) (E11 9)   5  Elevated blood pressure reading (796 2) (R03 0)   6  Elevated hemoglobin (282 7) (D58 2)   7  Foraminal stenosis of lumbosacral region (724 02) (M99 83)   8  Gait disorder (781 2) (R26 9)   9  Lumbar radiculitis (724 4) (M54 16)   10  Lumbar spinal stenosis (724 02) (M48 06)   11  Morbid obesity (278 01) (E66 01)   12  Onychomycosis of multiple toenails with type 2 diabetes mellitus (250 80,110 1)    (E11 69,B35 1)   13  Positive depression screening (796 4) (Z13 89)   14  Skin candidiasis (112 3) (B37 2)   15  Weakness of left lower extremity (729 89) (R29 898)    Past Medical History    1  History of Diabetes mellitus of other type with circulatory complication, without long-term   current use of insulin (250 70) (E13 59)   2  History of hearing loss (V12 49) (Z86 69)    The active problems and past medical history were reviewed and updated today  Surgical History    1  History of Tonsillectomy With Adenoidectomy    The surgical history was reviewed and updated today  Family History  Mother    1  No pertinent family history  Grandfather    2  Family history of malignant neoplasm of stomach (V16 0) (Z80 0)    Social History    · Denied: History of Drug use   · Employed   · Lives with parents   · Never a smoker   · No illicit drug use   · Single   · Social alcohol use (Z78 9)    Current Meds   1  Gabapentin 300 MG Oral Capsule; TAKE 1 CAPSULE 3 TIMES DAILY;    Therapy: 38GVU3332 to (Evaluate:03Sep2017)  Requested for: 61NRI2245; Last   Rx:46Uys1470 Ordered   2  Gabapentin 600 MG Oral Tablet; TAKE 1 TABLET 3 TIMES DAILY; Therapy: 91QLE9670 to (Evaluate:03Sep2017)  Requested for: 59SQH4409; Last   Rx:82Sax4362; Status: ACTIVE - Renewal Denied Ordered   3  MetFORMIN HCl - 500 MG Oral Tablet; TAKE 1 TABLET TWICE DAILY; Therapy: 25EQV8520 to (Evaluate:14Nov2017)  Requested for: 80UJY1834; Last   Rx:37Odq6978 Ordered   4  OneTouch Ultra 2 w/Device Kit; TEST ONE TIME DAILY; Therapy: 84MXX9099 to (Last Rx:15May2017) Ordered   5  OneTouch Ultra Blue In Vitro Strip; TEST ONCE DAILY; Therapy: 02BYE7344 to (Evaluate:08Vru7940)  Requested for: 65ONH6886; Last   Rx:87Wqa8252 Ordered    The medication list was reviewed and updated today  Allergies    1  No Known Drug Allergies    Vitals  Signs   Recorded: 07Sep2017 09:29AM   Heart Rate: 96  Systolic: 615  Diastolic: 80  Height: 6 ft   Weight: 411 lb   BMI Calculated: 55 74  BSA Calculated: 2 89    Physical Exam    Constitutional   General appearance: Abnormal   morbidly obese  Lumbar/Sacral Spine examination demonstrates Lumbosacral Spine:   Evaluation of Muscle Stretch Reflexes on the right side demonstrates 0/4 Hamstring Reflex, 0/4 Knee Jerk Reflex and 0/4 Ankle Jerk Reflex, but negative Jaquez Test right upper extremity  Evaluation of Muscle Stretch Reflexes on the left side demonstrates 0/4 Hamstring Reflex, 0/4 Knee Jerk Reflex and 0/4 Ankle Jerk Reflex, but negative Jaquez Test left upper extremity  Special Tests: negative Straight Leg Raise on right and negative Straight Leg Raise on left  Results/Data    Diagnostic Review last PT re-eval 8/11 continued PTx advised        Future Appointments    Date/Time Provider Specialty Site   09/14/2017 11:45 AM Ashlee Casiano MD Internal Medicine [facility]     Signatures   Electronically signed by : Марина Gupta DO; Sep  7 2017  9:53AM EST                       (Author)

## 2018-01-17 NOTE — MISCELLANEOUS
Message  Return to work or school:   Nighat May is under my professional care  He was seen in my office on 3/10/2017   He is able to return to work on  UNTIL NEXT APPT on 3/21/2017      This patient is unable to return to work until his next appointment    If you have any questions, please feel free to contact to office at [phone]          Signatures   Electronically signed by : Becky Olivier DO; Mar 10 2017 11:51AM EST                       (Author)    Electronically signed by : Becky Olivier DO; Mar 17 2017  1:03PM EST                       (Author)

## 2018-01-18 ENCOUNTER — ALLSCRIPTS OFFICE VISIT (OUTPATIENT)
Dept: OTHER | Facility: OTHER | Age: 38
End: 2018-01-18

## 2018-01-18 NOTE — PROGRESS NOTES
Assessment    1  Foraminal stenosis of lumbosacral region (724 02) (M99 83)    Plan  Foraminal stenosis of lumbosacral region    · Follow-up Visit in 4 Weeks Evaluation and Treatment  Follow-up  Status: Hold For -  Scheduling  Requested for: 87GVP7267   · EMG ONE EXTREMITY WITH OR W/O RELATED PARASPINAL AREAS; Status:Hold For -  Scheduling; Requested for:69Tbz7934;   ONE : RLE    Discussion/Summary  Impression: low back pain and disc herniation  Currently, the condition is improving  The diagnostic plan includes electromyography  There are no changes in medication management  Patient discussion: discussed with the patient  Chief Complaint  Disability eval from Pain Medicine  7/6 fu for same  History of Present Illness  5/25 IE: 29 yo male with non-traumatic onset of severe LBP nd LE sensory disturbance, admitted [facility] March 3 thru 6th   cauda equina ruled out by neurosurgery  Had CT / myelo done as could not complete MRI (advised to be done with anesthesia) currently in PTx by PCP and active treatment with Pain Medicine  LESI pending 1st week of June  On methocarbamol and gabapentin  Pattern is axial lumbar, numbness in sacral area, and numbness in left 1st two toes  Biggest problem is leg get weak with any prolonged ambulation  Goal in PT as stated is to toe stand  Some recent right buttock pain radiating to calf on the right w/in last week  Knees do not buckle, no foot drop or toe drag but legs feel "tired" and must use walker  He is reporting definite improvement  Has been suggested to have gastric bypass  Last worked March 1st 7/6 last PTx yesterday   feels improved with PTx and LESI   had one   feels "a syed better"   feel stronger in left ankle dorsiflexors  feels able to RTW  Able to do prolonged standing  Active Problems    1  Acute back pain (724 5) (M54 9)   2  Acute right-sided low back pain with sciatica (724 2,724 3) (M54 40)   3   Central stenosis of spinal canal (724 00) (M48 00) 4  DM type 2 (diabetes mellitus, type 2) (250 00) (E11 9)   5  Elevated blood pressure reading (796 2) (R03 0)   6  Elevated hemoglobin (282 7) (D58 2)   7  Foraminal stenosis of lumbosacral region (724 02) (M99 83)   8  Gait disorder (781 2) (R26 9)   9  Lumbar radiculitis (724 4) (M54 16)   10  Lumbar spinal stenosis (724 02) (M48 06)   11  Morbid obesity (278 01) (E66 01)   12  Onychomycosis of multiple toenails with type 2 diabetes mellitus (250 80,110 1)    (E11 69,B35 1)   13  Positive depression screening (796 4) (Z13 89)   14  Skin candidiasis (112 3) (B37 2)    Past Medical History    1  History of Diabetes mellitus of other type with circulatory complication, without long-term   current use of insulin (250 70) (E13 59)   2  History of hearing loss (V12 49) (Z86 69)    The active problems and past medical history were reviewed and updated today  Surgical History    1  History of Tonsillectomy With Adenoidectomy    The surgical history was reviewed and updated today  Family History  Mother    1  No pertinent family history  Grandfather    2  Family history of malignant neoplasm of stomach (V16 0) (Z80 0)    The family history was reviewed and updated today  Social History    · Denied: History of Drug use   · Employed   · Lives with parents   · Never a smoker   · No illicit drug use   · Single   · Social alcohol use (Z78 9)  The social history was reviewed and is unchanged  Current Meds   1  Gabapentin 300 MG Oral Capsule; TAKE 1 CAPSULE 3 TIMES DAILY; Therapy: 44FEP2451 to (Evaluate:99Cwd2539)  Requested for: 04IAF7353; Last   Rx:65Zij0903 Ordered   2  Gabapentin 600 MG Oral Tablet; TAKE 1 TABLET 3 TIMES DAILY; Therapy: 39AGL3053 to (Evaluate:06Slx3187)  Requested for: 75FCJ5096; Last   Rx:47Iii9730; Status: ACTIVE - Renewal Denied Ordered   3  MetFORMIN HCl - 500 MG Oral Tablet; TAKE 1 TABLET TWICE DAILY;    Therapy: 31WPA4171 to ([phone])  Requested for: 69TJJ7348; Last   Rx:03Mar2017 Ordered   4  Methocarbamol 500 MG Oral Tablet; TAKE 1 TABLET 3 TIMES DAILY  Requested for:   36HIB5095; Last Rx:00Guo8284 Ordered   5  OneTouch Ultra 2 w/Device Kit; TEST ONE TIME DAILY; Therapy: 16WXN3522 to (Last Rx:15May2017) Ordered   6  OneTouch Ultra Blue In Vitro Strip; TEST ONCE DAILY; Therapy: 45VWK1529 to (Evaluate:28Ziu1525)  Requested for: 44MNQ2070; Last   Rx:47Ros7958 Ordered    The medication list was reviewed and updated today  Allergies    1  No Known Drug Allergies    Vitals  Signs   Recorded: 37ETA9643 09:32AM   Heart Rate: 88  Systolic: 719  Diastolic: 82  Height: 6 ft   Weight: 407 lb   BMI Calculated: 55 2  BSA Calculated: 2 88    Physical Exam    Constitutional   General appearance: Abnormal   obese  Lumbar/Sacral Spine examination demonstrates Lumbosacral Spine:   Evaluation of Muscle Stretch Reflexes on the right side demonstrates 0/4 Hamstring Reflex, 0/4 Knee Jerk Reflex and 0/4 Ankle Jerk Reflex  Evaluation of Muscle Stretch Reflexes on the left side demonstrates 0/4 Hamstring Reflex, 0/4 Knee Jerk Reflex and 0/4 Ankle Jerk Reflex  Special Tests: negative Straight Leg Raise on right and negative Straight Leg Raise on left  Results/Data    Diagnostic Review PM notes reviewed    "90 % better  gabapentin adjusted further injections discussed        Future Appointments    Date/Time Provider Specialty Site   07/25/2017 02:15 PM Meggan Chaves MD Internal Medicine [facility]     Signatures   Electronically signed by : Mendel Rao, DO; Jul 6 2017  9:46AM EST                       (Author)

## 2018-01-18 NOTE — MISCELLANEOUS
Message   Recorded as Task   Date: 05/30/2017 01:35 PM, Created By: Ronold Peabody   Task Name: Follow Up   Assigned To: [address] [place] [address] clinical,Team   Regarding Patient: Roderick Juarez, Status: Active   Comment:    Myranda Palomares - 30 May 2017 1:35 PM     TASK CREATED  Caller: Self; General Medical Question; [phone] (Home)    ***FYI***  Caller transferred from [facility]   Pt stated taht he has an upcoming procedure with JE on 6/7 of a PEYTON L5 TFESI  Per pt he is very nervous and afraid of needles and would like to know if he can wear his ear buds and listen to music while he is getting the injection  Advised pt that he may do that to help him relax as long as he can hear and respond to Dannie Connolly  Pt appreciative of same  Aunjordan Stonealexandro - 30 May 2017 1:48 PM     TASK REPLIED TO: Previously Assigned To [address] [place] [address] clinical,Team                      yes agree that is Myranda Young - 30 May 2017 1:51 PM     TASK Chris Ng - 30 May 2017 1:52 PM     TASK REACTIVATED   Silvino Sanchez - 30 May 2017 1:54 PM     TASK EDITED  Pt called and stated the paperwork will be faxed to Dr Christianne Bowman office  Any questions, please call [phone]  Active Problems    1  Acute back pain (724 5) (M54 9)   2  Acute right-sided low back pain with sciatica (724 2,724 3) (M54 40)   3  Central stenosis of spinal canal (724 00) (M48 00)   4  DM type 2 (diabetes mellitus, type 2) (250 00) (E11 9)   5  Elevated blood pressure reading (796 2) (R03 0)   6  Elevated hemoglobin (282 7) (D58 2)   7  Foraminal stenosis of lumbosacral region (724 02) (M99 83)   8  Gait disorder (781 2) (R26 9)   9  Lumbar radiculitis (724 4) (M54 16)   10  Lumbar spinal stenosis (724 02) (M48 06)   11  Morbid obesity (278 01) (E66 01)   12  Onychomycosis of multiple toenails with type 2 diabetes mellitus (250 80,110 1)    (E11 69,B35 1)   13  Positive depression screening (796 4) (R68 89)   14   Skin candidiasis (112 3) (B37 2)    Current Meds   1  Docusate Sodium 100 MG Oral Capsule; TAKE 1 CAPSULE TWICE DAILY; Therapy: (Recorded:07Mar2017) to Recorded   2  Gabapentin 600 MG Oral Tablet; TAKE 1 TABLET 3 TIMES DAILY; Therapy: 46UIL5133 to (Evaluate:07Wzi7966)  Requested for: 91RNT2913; Last   Rx:11May2017 Ordered   3  MetFORMIN HCl - 500 MG Oral Tablet; TAKE 1 TABLET TWICE DAILY; Therapy: 92SMU5348 to (467 20 767)  Requested for: 13AJI0898; Last   Rx:03Mar2017 Ordered   4  Methocarbamol 500 MG Oral Tablet; TAKE 1 TABLET 3 TIMES DAILY  Requested for:   73TTO0787; Last Rx:11May2017 Ordered   5  OneTouch Ultra 2 w/Device Kit; TEST ONE TIME DAILY; Therapy: 17RKW8398 to (Last Rx:15May2017) Ordered   6  OneTouch Ultra Blue In Vitro Strip; TEST ONCE DAILY; Therapy: 52SJL6831 to (Evaluate:77Qra7639)  Requested for: 02BCT6105; Last   Rx:15May2017 Ordered   7  TraMADol HCl - 50 MG Oral Tablet; take 1 tablet every 12 hours as needed; Therapy: 33CWT3482 to (Evaluate:05May2017); Last Rx:05Apr2017 Ordered    Allergies    1   No Known Drug Allergies    Signatures   Electronically signed by : Nisha Cheung RN; May 30 2017  2:08PM EST                       (Author)

## 2018-01-19 NOTE — PROGRESS NOTES
Assessment   1  Lumbar spinal stenosis (724 02) (M48 061)    Plan    · Follow-up PRN Evaluation and Treatment  Follow-up  Status: Complete  Done:    35XJO4532 11:25AM   Ordered; For: Lumbar spinal stenosis; Ordered By: Wilder Foster Performed:  Due: 81CHU8425    Discussion/Summary      Mr Robbie Spain is doing well with conservative treatment  we reviewed the role of PT, CHRISTIANO and medications  I see no compelling indications for surgery  I will see him on a PRN basis  The patient has the current Goals: Continue current therapy  The patent has the current Barriers: None  Patient is able to Self-Care  Self Referrals: No      Chief Complaint   1  Back Pain  Patient presents for follow up  History of Present Illness   patient returns for fu LBP and leg pain  significant improvement with PT, exercise program and medications  some persistent left toe numbness  new MRI done  Peggye Gottron presents with complaints of no back pain  Associated symptoms include spasm-- and-- stiffness, but-- no fever,-- no night sweats,-- no abdominal pain,-- no general malaise,-- no weight loss,-- no arm numbness,-- no arm weakness,-- no leg weakness,-- no urinary incontinence,-- no fecal incontinence,-- no urinary retention-- and-- no rash localized to the area of pain       leg numbness (left big toe numbness)  Review of Systems        Constitutional: no fever-- and-- no chills  Eyes: no eye pain-- and-- no eyesight problems  ENT: no hearing loss  Cardiovascular: no chest pain-- and-- no palpitations  ROS reviewed  Active Problems   1  Acute back pain (724 5) (M54 9)   2  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 9)   3  Central stenosis of spinal canal (724 00) (M48 00)   4  Disability examination (V68 01) (Z02 71)   5  DM type 2 (diabetes mellitus, type 2) (250 00) (E11 9)   6  Elevated blood pressure reading (796 2) (R03 0)   7  Elevated hemoglobin (282 7) (D58 2)   8   Foraminal stenosis of lumbosacral region (724 02) (M99 83)   9  Gait disorder (781 2) (R26 9)   10  Intertrigo (695 89) (L30 4)   11  Lumbar radiculitis (724 4) (M54 16)   12  Lumbar spinal stenosis (724 02) (M48 061)   13  Morbid obesity (278 01) (E66 01)   14  Positive depression screening (796 4) (Z13 89)   15  Weakness of left lower extremity (729 89) (R29 898)    Past Medical History   1  History of Acute right-sided low back pain with sciatica (724 2,724 3) (M54 40)   2  History of Diabetes mellitus of other type with circulatory complication, without long-term     current use of insulin (250 70) (E13 59)   3  History of hearing loss (V12 49) (Z86 69)   4  History of Onychomycosis of multiple toenails with type 2 diabetes mellitus     (250 80,110 1) (E11 69,B35 1)   5  History of Skin candidiasis (112 3) (B37 2)    Surgical History   1  History of Tonsillectomy With Adenoidectomy    Family History   Mother    1  No pertinent family history  Grandfather    2  Family history of malignant neoplasm of stomach (V16 0) (Z80 0)    Social History    · Denied: History of Drug use   · Employed   · Lives with parents   · Never a smoker   · No illicit drug use   · Single   · Social alcohol use (Z78 9)    Current Meds    1  Clotrimazole 1 % External Cream; apply sparingly to affected area(s) twice daily; Therapy: 06Fhu7487 to (Last Rx:15Hav3514)  Requested for: 70Wjf7802 Ordered   2  MetFORMIN HCl - 500 MG Oral Tablet; TAKE 1 TABLET TWICE DAILY; Therapy: 77NDJ0023 to [phone])  Requested for: 11YBY1985; Last     Rx:05Ltb7891 Ordered   3  Methocarbamol 750 MG Oral Tablet; TAKE 1 TO 2 TABLETS 3 TIMES DAILY AS NEEDED; Therapy: 70Nql9739 to (Joe Tovar)  Requested for: 05Oct2017; Last     Rx:05Oct2017 Ordered   4  OneTouch Ultra 2 w/Device Kit; TEST ONE TIME DAILY; Therapy: 18VYW2518 to (Last Rx:43Ibh2788) Ordered   5  OneTouch Ultra Blue In Vitro Strip; TEST ONCE DAILY;      Therapy: 31XGO8690 to (Evaluate:26Awo0114)  Requested for: 09TOA2350; Last     Rx:98Sro4074 Ordered    Allergies   1  Icy Hot Pain Relieving GEL    Vitals   Vital Signs    Recorded: 92IMW1254 11:08AM   Temperature 97 8 F   Heart Rate 86   Respiration 16   Systolic 656   Diastolic 90   Height 6 ft    Weight 382 lb    BMI Calculated 51 81   BSA Calculated 2 8     Physical Exam    (stable)      Constitutional Patient appears healthy and well developed  No signs of acute distress present  Eyes Vision is grossly normal  Discs flat with sharp margins  Respiratory Auscultation of lungs: Clear to auscultation bilaterally  Cardiovascular Auscultation of heart: Rate is regular  Rhythm is regular  No heart murmur appreciated  Carotid pulses: 2+ and equal bilaterally, without bruits  -- Peripheral vascular exam: Pedal Pulses: 2+ and equal bilaterally  Radial pulses: 2+ and equal bilaterally  -- Extremities: No edema of the lower limbs bilaterally  Abdomen The abdomen is flat  No abdominal scars  Abdomen is soft, nontender, and nondistended  -- Anus, perineum, and rectum: Exam deferred  Neurologic - Mental Status: Alert and Oriented x3  -- Mood and affect: Affect is normal  -- Memory is grossly intact  -- Attention is WNL  -- Speech is articulated and fluent  -- Knowledge and vocabulary consistent with education  Cranial Nerve Exam:  2nd cranial nerve: Visual field was full to confrontation  -- 3rd, 4th, and 6th cranial nerves: Normal with no deficit  -- 5th cranial nerve: Sensation was intact in all three divisions to light touch and temperature  Motor function was intact  -- 7th cranial nerve: Face symmetrical at grimace and at rest -- 8th cranial nerve: Grossly intact to finger rub bilaterally  -- 9th and 10th cranial nerves: Uvula is midline  -- 11th cranial nerve: Shoulder shrug equal bilaterally  -- 12th cranial nerve: Tongue mideline, no atrophy present       Motor System General Motor Strength: No pronator drift and no parietal drift  Motor System - Upper Extremities: Normal to inspection and palpation  Strength: Deltoids 5/5 bilaterally  Biceps 5/5 bilaterally  Triceps 5/5 bilaterally  Extensor carpi radials is 5/5 bilaterally  Extensor digitorum 5/5 bilaterally  Intrinsic 5/5 bilaterally   5/5 bilaterally  -- Muscle tone: Normal bilaterally  -- Muscle Bulk: Normal bilaterally  Motor System - Lower Extremities: Normal to inspection and palpation  Strength: Iliopsoas 5/5 bilaterally  Quadriceps 5/5 bilaterally  Hamstrings 5/5 bilaterally  Gastrocnemius 5/5 bilaterally  -- Muscle tone: Normal bilaterally  Reflexes: Biceps reflexes are 2+ bilaterally  Triceps reflexes are 2+ bilaterally  Achilles reflexes are 2+ bilaterally  Babinski's reflex is 2+ down going bilaterally  Ankle clonus is absent bilaterally  Coordination: Finger to nose was normal       Sensory: Sensation grossly intact to light touch  Sensation grossly intact to light touch  Gait and Station: Encampment with a normal gait  Results/Data   Diagnostic Studies Reviewed Neurosurger [facility]:      I personally reviewed the MRI in detail with the patient  MRI Review modest L4/5 stenosis secondary to facet arthropathy R>L  Health Management   DM type 2 (diabetes mellitus, type 2)   *VB - Foot Exam; every 1 year; Last 43BXX1227; Next Due: 21Mar2018; Active  Positive depression screening   PHevery-9 Adult Depression Screening; every 1 year; Last 00GXO2464; Next Due: 21Mar2018;     Active    Future Appointments      Date/Time Provider Specialty Site   03/01/2018 08:40 AM Alba Rivera DO [facility]     Signatures    Electronically signed by : KACY Gonzales ; Jan 18 2018 11:27AM EST                       (Author)

## 2018-01-22 VITALS
SYSTOLIC BLOOD PRESSURE: 160 MMHG | HEIGHT: 72 IN | WEIGHT: 315 LBS | BODY MASS INDEX: 42.66 KG/M2 | HEART RATE: 96 BPM | DIASTOLIC BLOOD PRESSURE: 80 MMHG

## 2018-01-22 VITALS
SYSTOLIC BLOOD PRESSURE: 138 MMHG | HEART RATE: 88 BPM | WEIGHT: 315 LBS | DIASTOLIC BLOOD PRESSURE: 82 MMHG | HEIGHT: 72 IN | BODY MASS INDEX: 42.66 KG/M2

## 2018-01-22 VITALS
SYSTOLIC BLOOD PRESSURE: 122 MMHG | HEART RATE: 76 BPM | HEIGHT: 72 IN | BODY MASS INDEX: 42.66 KG/M2 | DIASTOLIC BLOOD PRESSURE: 84 MMHG | WEIGHT: 315 LBS

## 2018-01-22 VITALS
SYSTOLIC BLOOD PRESSURE: 124 MMHG | HEIGHT: 72 IN | DIASTOLIC BLOOD PRESSURE: 64 MMHG | HEART RATE: 69 BPM | TEMPERATURE: 97.3 F | WEIGHT: 315 LBS | OXYGEN SATURATION: 91 % | BODY MASS INDEX: 42.66 KG/M2

## 2018-01-22 VITALS
BODY MASS INDEX: 42.66 KG/M2 | DIASTOLIC BLOOD PRESSURE: 76 MMHG | SYSTOLIC BLOOD PRESSURE: 130 MMHG | WEIGHT: 315 LBS | HEIGHT: 72 IN | HEART RATE: 88 BPM

## 2018-01-22 VITALS
DIASTOLIC BLOOD PRESSURE: 84 MMHG | HEART RATE: 100 BPM | BODY MASS INDEX: 42.66 KG/M2 | SYSTOLIC BLOOD PRESSURE: 113 MMHG | TEMPERATURE: 98.7 F | RESPIRATION RATE: 16 BRPM | HEIGHT: 72 IN | WEIGHT: 315 LBS

## 2018-01-22 VITALS
SYSTOLIC BLOOD PRESSURE: 145 MMHG | BODY MASS INDEX: 42.66 KG/M2 | DIASTOLIC BLOOD PRESSURE: 90 MMHG | TEMPERATURE: 97.8 F | HEIGHT: 72 IN | WEIGHT: 315 LBS | RESPIRATION RATE: 16 BRPM | HEART RATE: 86 BPM

## 2018-01-23 VITALS
DIASTOLIC BLOOD PRESSURE: 86 MMHG | WEIGHT: 315 LBS | SYSTOLIC BLOOD PRESSURE: 132 MMHG | BODY MASS INDEX: 42.66 KG/M2 | HEIGHT: 72 IN | HEART RATE: 80 BPM

## 2018-01-23 VITALS
BODY MASS INDEX: 42.66 KG/M2 | HEIGHT: 72 IN | SYSTOLIC BLOOD PRESSURE: 130 MMHG | DIASTOLIC BLOOD PRESSURE: 80 MMHG | WEIGHT: 315 LBS | HEART RATE: 64 BPM

## 2018-01-23 NOTE — PROGRESS NOTES
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)    Plan  Acute back pain, Bone marrow disorder, Central stenosis of spinal canal, Foraminal  stenosis of lumbosacral region    · Follow-up visit in 2 months Evaluation and Treatment  Follow-up  Status: Hold For -  Scheduling  Requested for: 00CSG2499  Bone marrow disorder    · 1 - Dontrell NEWMAN, [facility] Donato  Internal Medicine Co-Management  Please fu on bone marrow  issues  Status: Hold For - Scheduling  Requested for: [id number]  Care Summary provided  : Yes  Foraminal stenosis of lumbosacral region, Lumbar radiculitis, Lumbar spinal stenosis,  Weakness of left lower extremity    · 1 - Corey NEWMAN, [facility] Neurosurgery Co-Management  Re-eval after new  imaging   persistent weakness  Status: Active  Requested for: 15RLR5615  Care Summary provided  : Yes    Discussion/Summary  Patient is able to Self-Care  Impression: low back pain, disc herniation and radiculopathy  Currently, the condition is unchanged  Treatment plan includes physical therapy and neurosurgery re-evaluation  Patient discussion: discussed with the patient, please see your PCP regarding MRI  and any further blood tests  Chief Complaint  Disability eval from Pain Medicine  7/6 fu for same  8/10 follow up after RTW  9/7 fu after RTW  10/5 fu for left leg weakness  11/2 fu for RTW issues  12/14 fu for RTW    1/4/18 fu after MRI      History of Present Illness  5/25 IE: 29 yo male with non-traumatic onset of severe LBP nd LE sensory disturbance, admitted [facility] March 3 thru 6th   cauda equina ruled out by neurosurgery  Had CT / myelo done as could not complete MRI (advised to be done with anesthesia) currently in PTx by PCP and active treatment with Pain Medicine  LESI pending 1st week of June  On methocarbamol and gabapentin   Pattern is axial lumbar, numbness in sacral area, and numbness in left 1st two toes  Biggest problem is leg get weak with any prolonged ambulation  Goal in PT as stated is to toe stand  Some recent right buttock pain radiating to calf on the right w/in last week  Knees do not buckle, no foot drop or toe drag but legs feel "tired" and must use walker  He is reporting definite improvement  Has been suggested to have gastric bypass  Last worked March 1st 7/6 last PTx yesterday   feels improved with PTx and ABILIO   had one   feels "a syed better"   feel stronger in left ankle dorsiflexors  feels able to RTW  Able to do prolonged standing  8/10 has RTW w/o problem   in PTx and PM has started him on gabapentin  July 26 I had sent back to PTx due to left leg weakness and + EMG   no IE received yet  Unable to return to bowling  No falls, no tripping feels problem "controlling left foot"  C/O buttock pin with prolonged sitting  9/7 tolerating work   attends PTx in [place]   feels gabapentin not helpful (PM Rx ing)  10/5 follow up   still in PTx  Asks about continued numbness in left foot and gabapentin  is taking 600 tid  not sure if helpful  Has weaned off gabapentin   on only metformin and methocarbamol  Wants to do fitness program at hospitals only due to $ 60 / week copay  Feels he is getting stronger  Asks about chiropractor  12/14 last Ov sent for PTx  Seen by chiropractor two time   unable to sustain balance for bowling    has a    DC wants an MRI    1/4/18 fu after MRI  He had been deemed non-surgical by NS as in-patient and out-pt   denies any increase in weakness   going to chiropractor   still in PT for strenghtening LLE  Review of Systems    Musculoskeletal: as noted in HPI  Neurological: weakness, but no numbness  Active Problems    1  Acute back pain (724 5) (M54 9)   2  Central stenosis of spinal canal (724 00) (M48 00)   3  Disability examination (V68 01) (Z02 71)   4  DM type 2 (diabetes mellitus, type 2) (250 00) (E11 9)   5   Elevated blood pressure reading (796  2) (R03 0)   6  Elevated hemoglobin (282 7) (D58 2)   7  Foraminal stenosis of lumbosacral region (724 02) (M99 83)   8  Gait disorder (781 2) (R26 9)   9  Intertrigo (695 89) (L30 4)   10  Lumbar radiculitis (724 4) (M54 16)   11  Lumbar spinal stenosis (724 02) (M48 061)   12  Morbid obesity (278 01) (E66 01)   13  Positive depression screening (796 4) (Z13 89)   14  Weakness of left lower extremity (729 89) (R29 898)    Past Medical History    1  History of Acute right-sided low back pain with sciatica (724 2,724 3) (M54 40)   2  History of Diabetes mellitus of other type with circulatory complication, without long-term   current use of insulin (250 70) (E13 59)   3  History of hearing loss (V12 49) (Z86 69)   4  History of Onychomycosis of multiple toenails with type 2 diabetes mellitus   (250 80,110 1) (E11 69,B35 1)   5  History of Skin candidiasis (112 3) (B37 2)    The active problems and past medical history were reviewed and updated today  Surgical History    1  History of Tonsillectomy With Adenoidectomy    The surgical history was reviewed and updated today  Family History  Mother    1  No pertinent family history  Grandfather    2  Family history of malignant neoplasm of stomach (V16 0) (Z80 0)    Social History    · Denied: History of Drug use   · Employed   · Lives with parents   · Never a smoker   · No illicit drug use   · Single   · Social alcohol use (Z78 9)    Current Meds   1  Advil TABS; Therapy: (Recorded:02Nov2017) to Recorded   2  Clotrimazole 1 % External Cream; apply sparingly to affected area(s) twice daily; Therapy: 81Snw9778 to (Last Rx:07Hpd4662)  Requested for: 79Dbm0156 Ordered   3  Ibuprofen 800 MG Oral Tablet; Therapy: (Recorded:25Oai0418) to Recorded   4  MetFORMIN HCl - 500 MG Oral Tablet; TAKE 1 TABLET TWICE DAILY; Therapy: 42TUR0174 to (21 )  Requested for: 62YYP7483; Last   Rx:27Uae1720 Ordered   5   Methocarbamol 750 MG Oral Tablet; TAKE 1 TO 2 TABLETS 3 TIMES DAILY AS NEEDED; Therapy: 34Rwq2180 to (Evaluate:25Nov2017)  Requested for: 05Oct2017; Last   Rx:05Oct2017 Ordered   6  OneTouch Ultra 2 w/Device Kit; TEST ONE TIME DAILY; Therapy: 22JIO2319 to (Last Rx:15May2017) Ordered   7  OneTouch Ultra Blue In Vitro Strip; TEST ONCE DAILY; Therapy: 32RVO7453 to (Evaluate:43Ued0468)  Requested for: 44UAJ1578; Last   Rx:15May2017 Ordered    Allergies    1  Icy Hot Pain Relieving GEL    Vitals  Signs   Recorded: 75SEM7237 09:48AM   Heart Rate: 64  Systolic: 726  Diastolic: 80  Height: 6 ft   Weight: 388 lb   BMI Calculated: 52 62  BSA Calculated: 2 82    Physical Exam          Lumbar/Sacral Spine examination demonstrates  areflexic, still unable to heel stand on the left  gait otherwise normal       Results/Data  I personally reviewed the films/images/results in the office today  My interpretation follows  MRI Review L-ft L-5 / S-1 foraminal disc protrusion   correlates with Sx   "RED MARROW CONVERSION" ? ?       Signatures   Electronically signed by : Carmine Baxter DO; Jan 4 2018 10:15AM EST                       (Author)

## 2018-01-23 NOTE — PROGRESS NOTES
Assessment    1  Lumbar spinal stenosis (724 02) (M48 061)   2  Central stenosis of spinal canal (724 00) (M48 00)   3  Weakness of left lower extremity (729 89) (R29 898)    Plan  Weakness of left lower extremity    · * MRI LUMBAR SPINE WO CONTRAST; Status:Need Information - Financial  Authorization; Requested for:[id number];    · Follow Up After Tests Complete Evaluation and Treatment  Follow-up  Status: Hold For -  Scheduling  Requested for: [id number]    Discussion/Summary  The patient has the current Goals: Return to bowling  The patent has the current Barriers: Weight, leg weakness  Patient is able to Self-Care  Patient agrees and allows to involve family/caregiver in development of care plan:   Impression: low back pain, radiculopathy and spinal stenosis  Currently, the condition is unchanged  The diagnostic plan includes lumbar spine MRI  There are no changes in medication management  Treatment plan includes Fitness program  Patient discussion: discussed with the patient  Chief Complaint  Disability eval from Pain Medicine  7/6 fu for same  8/10 follow up after RTW  9/7 fu after RTW  10/5 fu for left leg weakness  11/2 fu for RTW issues  12/14 fu for RTW   History of Present Illness  5/25 IE: 29 yo male with non-traumatic onset of severe LBP nd LE sensory disturbance, admitted [facility] March 3 thru 6th   cauda equina ruled out by neurosurgery  Had CT / myelo done as could not complete MRI (advised to be done with anesthesia) currently in PTx by PCP and active treatment with Pain Medicine  LESI pending 1st week of June  On methocarbamol and gabapentin  Pattern is axial lumbar, numbness in sacral area, and numbness in left 1st two toes  Biggest problem is leg get weak with any prolonged ambulation  Goal in PT as stated is to toe stand  Some recent right buttock pain radiating to calf on the right w/in last week   Knees do not buckle, no foot drop or toe drag but legs feel "tired" and must use walker  He is reporting definite improvement  Has been suggested to have gastric bypass  Last worked March 1st 7/6 last PTx yesterday   feels improved with PTx and LESI   had one   feels "a syed better"   feel stronger in left ankle dorsiflexors  feels able to RTW  Able to do prolonged standing  8/10 has RTW w/o problem   in PTx and PM has started him on gabapentin  July 26 I had sent back to PTx due to left leg weakness and + EMG   no IE received yet  Unable to return to bowling  No falls, no tripping feels problem "controlling left foot"  C/O buttock pin with prolonged sitting  9/7 tolerating work   attends PTx in [place]   feels gabapentin not helpful (PM Rx ing)  10/5 follow up   still in PTx  Asks about continued numbness in left foot and gabapentin  is taking 600 tid  not sure if helpful  Has weaned off gabapentin   on only metformin and methocarbamol  Wants to do fitness program at PTx only due to $ 60 / week copay  Feels he is getting stronger  Asks about chiropractor  12/14 last Ov sent for PTx  Seen by chiropractor two time   unable to sustain balance for bowling    has a    DC wants an MRI  Review of Systems    Gastrointestinal: No complaints of abdominal pain, no constipation, no nausea or vomiting, no diarrhea or bloody stools  Genitourinary: No complaints of dysuria or incontinence, no hesitancy, no nocturia  Musculoskeletal: as noted in HPI  Neurological: left great toe  and tingling  ROS reviewed  Active Problems    1  Acute back pain (724 5) (M54 9)   2  Central stenosis of spinal canal (724 00) (M48 00)   3  Disability examination (V68 01) (Z02 71)   4  DM type 2 (diabetes mellitus, type 2) (250 00) (E11 9)   5  Elevated blood pressure reading (796 2) (R03 0)   6  Elevated hemoglobin (282 7) (D58 2)   7  Foraminal stenosis of lumbosacral region (724 02) (M99 83)   8  Gait disorder (781 2) (R26 9)   9  Intertrigo (695 89) (L30 4)   10   Lumbar radiculitis (724 4) (M54 16)   11  Lumbar spinal stenosis (724 02) (M48 061)   12  Morbid obesity (278 01) (E66 01)   13  Positive depression screening (796 4) (Z13 89)   14  Weakness of left lower extremity (729 89) (R29 898)    Past Medical History    1  History of Acute right-sided low back pain with sciatica (724 2,724 3) (M54 40)   2  History of Diabetes mellitus of other type with circulatory complication, without long-term   current use of insulin (250 70) (E13 59)   3  History of hearing loss (V12 49) (Z86 69)   4  History of Onychomycosis of multiple toenails with type 2 diabetes mellitus   (250 80,110 1) (E11 69,B35 1)   5  History of Skin candidiasis (112 3) (B37 2)    The active problems and past medical history were reviewed and updated today  Surgical History    1  History of Tonsillectomy With Adenoidectomy    The surgical history was reviewed and updated today  Family History  Mother    1  No pertinent family history  Grandfather    2  Family history of malignant neoplasm of stomach (V16 0) (Z80 0)    Social History    · Denied: History of Drug use   · Employed   · Lives with parents   · Never a smoker   · No illicit drug use   · Single   · Social alcohol use (Z78 9)  The social history was reviewed and is unchanged  Current Meds   1  Advil TABS; Therapy: (Recorded:02Nov2017) to Recorded   2  Amoxicillin 500 MG Oral Tablet; Therapy: (Recorded:07Vao5064) to Recorded   3  Clotrimazole 1 % External Cream; apply sparingly to affected area(s) twice daily; Therapy: 20Khv7203 to (Last Rx:52Xzf5919)  Requested for: 37Iwr1548 Ordered   4  Ibuprofen 800 MG Oral Tablet; Therapy: (Recorded:52Nfn3479) to Recorded   5  MetFORMIN HCl - 500 MG Oral Tablet; TAKE 1 TABLET TWICE DAILY; Therapy: 27NUE5153 to (05 06 52 16 25)  Requested for: 12RQK7191; Last   Rx:28Mcd1911 Ordered   6  Methocarbamol 750 MG Oral Tablet; TAKE 1 TO 2 TABLETS 3 TIMES DAILY AS NEEDED;    Therapy: 94Cxd4979 to (Alix Smith) Requested for: 93AXO5767; Last   Rx:74Nub2050 Ordered   7  OneTouch Ultra 2 w/Device Kit; TEST ONE TIME DAILY; Therapy: 23TOH1867 to (Last Rx:59Fwx4199) Ordered   8  OneTouch Ultra Blue In Vitro Strip; TEST ONCE DAILY; Therapy: 01VWL8082 to (Evaluate:45Awa5544)  Requested for: 59GWM9393; Last   Rx:75Yme5340 Ordered    The medication list was reviewed and updated today  Vitals  Signs   Recorded: 59Vqm5086 10:01AM   Heart Rate: 80  Systolic: 907  Diastolic: 86  Height: 6 ft   Weight: 390 lb   BMI Calculated: 52 89  BSA Calculated: 2 83    Physical Exam    Constitutional   General appearance: Abnormal   obese  Lumbar/Sacral Spine examination demonstrates Lumbosacral Spine:   Evaluation of Muscle Stretch Reflexes on the right side demonstrates 0/4 Hamstring Reflex, 0/4 Knee Jerk Reflex and 0/4 Ankle Jerk Reflex, but negative right ankle clonus  Evaluation of Muscle Stretch Reflexes on the left side demonstrates 0/4 Hamstring Reflex, 0/4 Knee Jerk Reflex, 0/4 Ankle Jerk Reflex and negative left ankle clonus  Special Tests: negative Straight Leg Raise on right and negative Straight Leg Raise on left  Results/Data    CT Scan Review Had CT myelogram    MRI Review Unable to get MRI done in hospital    Diagnostic Review Last PT note 10/10  (is doing doing fitness program)  EDX        Signatures   Electronically signed by : Kellie Dougherty DO; Dec 14 2017 10:24AM EST                       (Author)

## 2018-01-23 NOTE — RESULT NOTES
Verified Results  * MRI LUMBAR SPINE WO CONTRAST 09Vnb1424 04:55AM Sherley Rivera Due     Test Name Result Flag Reference   MRI LUMBAR SPINE WO CONTRAST (Report)     MRI LUMBAR SPINE WITHOUT CONTRAST     INDICATION: R29 898: Other symptoms and signs involving the musculoskeletal system  History taken directly from the electronic ordering system  Persistent left leg weakness inhibiting function with + EMG and despite continued conservative    care    Jillian Glez attention left L-5 / S-1     COMPARISON: 3/4/2017     TECHNIQUE: Sagittal T1, sagittal T2, sagittal inversion recovery, axial T1 and axial T2, coronal T2       IMAGE QUALITY: Diagnostic     FINDINGS:     ALIGNMENT: Normal alignment of the lumbar spine  No compression fracture  No spondylolysis or spondylolisthesis  No scoliosis  MARROW SIGNAL: Diffusely, markedly hypointense marrow on T1 and T2-weighted images  DISTAL CORD AND CONUS: Normal size and signal within the distal cord and conus  The conus ends at the L1 level  PARASPINAL SOFT TISSUES: Paraspinal soft tissues are unremarkable  SACRUM: Normal signal within the sacrum  No evidence of insufficiency or stress fracture  LOWER THORACIC DISC SPACES: Normal disc height and signal  No disc herniation, canal stenosis or foraminal narrowing  LUMBAR DISC SPACES:        L1-L2: Normal      L2-L3: There is a right paracentral disc herniation, protrusion type  There is mild central canal narrowing  Neural foramina bilaterally patent  L3-L4: There is a small right paracentral disc herniation, protrusion type with high signal in the posterior annulus indicative of a high intensity zone  No significant central canal or neural foraminal narrowing  L4-L5: Right paracentral disc herniation, protrusion type  Moderate central canal narrowing  Moderate right neural foraminal narrowing  Mild to moderate left neural foraminal narrowing  L5-S1: There is a left neural foraminal disc trusion   There is bilateral facet hypertrophy  Moderate left neural foraminal narrowing  Central canal right neural foramen patent  IMPRESSION:       1  Markedly abnormal diffusely hypointense marrow, compatible with red marrow conversion versus other infiltrative process  Does the patient have anemia? Other etiologies including myelofibrosis could be considered although the spleen is not    particularly enlarged  Diffuse metastases are also in the differential diagnosis although the uniform marrow signal hypointensity argues against this possibility  Further clinical correlation recommended  2  Multilevel spondylosis with moderate central canal narrowing at L4-5, likely contributing to the appearance of the previous myelogram        Workstation performed: [id number]     Signed by:    Bora Mitchell MD   12/28/17

## 2018-02-09 ENCOUNTER — EVALUATION (OUTPATIENT)
Dept: PHYSICAL THERAPY | Facility: REHABILITATION | Age: 38
End: 2018-02-09
Payer: COMMERCIAL

## 2018-02-09 DIAGNOSIS — M54.16 LUMBAR RADICULOPATHY: Primary | ICD-10-CM

## 2018-02-09 PROCEDURE — 97162 PT EVAL MOD COMPLEX 30 MIN: CPT | Performed by: PHYSICAL THERAPIST

## 2018-02-09 PROCEDURE — 97110 THERAPEUTIC EXERCISES: CPT | Performed by: PHYSICAL THERAPIST

## 2018-02-13 ENCOUNTER — OFFICE VISIT (OUTPATIENT)
Dept: PHYSICAL THERAPY | Facility: REHABILITATION | Age: 38
End: 2018-02-13
Payer: COMMERCIAL

## 2018-02-13 DIAGNOSIS — M54.16 LUMBAR RADICULOPATHY: Primary | ICD-10-CM

## 2018-02-13 PROCEDURE — 97110 THERAPEUTIC EXERCISES: CPT | Performed by: PHYSICAL THERAPIST

## 2018-02-13 PROCEDURE — 97140 MANUAL THERAPY 1/> REGIONS: CPT | Performed by: PHYSICAL THERAPIST

## 2018-02-13 NOTE — PROGRESS NOTES
Daily Note     Today's date: 2018  Patient name: Gianluca Briggs  : 1980  MRN: [id number]  Referring provider: Vanita Cook DO  Dx:   Encounter Diagnosis   Name Primary?  Lumbar radiculopathy Yes                  Subjective: Pt reports cramping in L calf at work today for about 20 minutes  Cramping eventually resolved after heel raises while holding on the back of the chair  Objective: See treatment diary below    Manual             Hamstring stretch 4 min x           Psoas release 4 min            Manual hip flexor stretch off high table  5min                                         Exercise Diary              Treadmill 12' 12'           EOT hip flexor st 1' x2            Piriformis stretch 30" x2            1/2 kneel hip flexor str 30" x2            HR leg press single  55#  3x10           Lunge walk with ball  4#  30ftx4           HR on step single             [employer] balance                                                                                  Modalities                                                           Assessment:  Pt has significantly more hip flexor and gastroc tightness tightness on L more than R  Pt demonstrated increased fatigue during lunge walk with 4# ball and has difficulty with HR single leg press on L  Plan: Plan to focus on L hip flexor and gastroc tightness

## 2018-02-14 PROCEDURE — G8990 OTHER PT/OT CURRENT STATUS: HCPCS | Performed by: PHYSICAL THERAPIST

## 2018-02-14 PROCEDURE — G8991 OTHER PT/OT GOAL STATUS: HCPCS | Performed by: PHYSICAL THERAPIST

## 2018-02-16 ENCOUNTER — OFFICE VISIT (OUTPATIENT)
Dept: PHYSICAL THERAPY | Facility: REHABILITATION | Age: 38
End: 2018-02-16
Payer: COMMERCIAL

## 2018-02-16 DIAGNOSIS — M54.16 LUMBAR RADICULOPATHY: Primary | ICD-10-CM

## 2018-02-16 PROCEDURE — 97140 MANUAL THERAPY 1/> REGIONS: CPT

## 2018-02-16 PROCEDURE — 97110 THERAPEUTIC EXERCISES: CPT

## 2018-02-16 NOTE — PROGRESS NOTES
Daily Note     Today's date: 2018  Patient name: Yoana Em  : 1980  MRN: [id number]  Referring provider: Yoana Sanon DO  Dx:   Encounter Diagnosis   Name Primary?  Lumbar radiculopathy Yes                  Subjective:    Leg bothers him but instensity varies  Calf always feels tight    PRECAUTIONS: none  Objective: See treatment diary below  Manual   2-16          Hamstring stretch 4 min x 6'          Psoas release 4 min  x          Manual hip flexor stretch off high table  5min x                                        Exercise Diary              Treadmill 12' 12' 12'  2 MPH          EOT hip flexor st 1' x2  x          Piriformis stretch 30" x2  3x          1/2 kneel hip flexor str 30" x2  x          HR leg press single  55#  3x10 x          Lunge walk with ball  4#  30ftx4 x          HR on step single   4" 10x          Biodex balance   catch                                                                               Modalities                                                           Assessment: Tolerated treatment well  Patient would benefit from continued PT      Plan: Continue per plan of care

## 2018-02-20 ENCOUNTER — OFFICE VISIT (OUTPATIENT)
Dept: PHYSICAL THERAPY | Facility: REHABILITATION | Age: 38
End: 2018-02-20
Payer: COMMERCIAL

## 2018-02-20 DIAGNOSIS — M54.16 LUMBAR RADICULOPATHY: Primary | ICD-10-CM

## 2018-02-20 PROCEDURE — 97140 MANUAL THERAPY 1/> REGIONS: CPT | Performed by: PHYSICAL THERAPIST

## 2018-02-20 NOTE — PROGRESS NOTES
Daily Note     Today's date: 2018  Patient name: Benito Bender  : 1980  MRN: [id number]  Referring provider: Adrianna Hackett DO  Dx:   Encounter Diagnosis     ICD-10-CM    1  Lumbar radiculopathy M54 16                   Subjective:  Pt  Getting severe right sided symptoms after doing progressive exercises today  Objective: See treatment diary below  Manual   2-16 2-20         Hamstring stretch 4 min x 6'          Psoas release 4 min  x          Manual hip flexor stretch off high table  5min x                                        Exercise Diary              Treadmill 12' 12' 12'  2 MPH          EOT hip flexor st 1' x2  x          Piriformis stretch 30" x2  3x          1/2 kneel hip flexor str 30" x2  x          HR leg press single  55#  3x10 x          Lunge walk with ball  4#  30ftx4 x          HR on step single   4" 10x          Biodex balance   catch          Squat - wt on heels - 15#    10  x6         Russian twist    10#  x6         Side glides to wall - left hip to wall    10x         Cat-n-camel    30         Quadriped le kickouts    10"  10x                                                    Modalities                                                           Assessment:  Pt  Did not tolerate progressive exercises  Tried a lot of core tasks without much success and they increased right sided nerve pain  Issued sideglides to eliminate right side pain  He needs a lot of coaching for good lumbar lordosis technique with all tasks  Plan: try to progress strengthening  Manage symptoms with Ashly approach

## 2018-02-23 ENCOUNTER — OFFICE VISIT (OUTPATIENT)
Dept: PHYSICAL THERAPY | Facility: REHABILITATION | Age: 38
End: 2018-02-23
Payer: COMMERCIAL

## 2018-02-23 DIAGNOSIS — M54.16 LUMBAR RADICULOPATHY: Primary | ICD-10-CM

## 2018-02-23 PROCEDURE — 97110 THERAPEUTIC EXERCISES: CPT | Performed by: PHYSICAL THERAPIST

## 2018-02-23 PROCEDURE — 97112 NEUROMUSCULAR REEDUCATION: CPT | Performed by: PHYSICAL THERAPIST

## 2018-02-23 PROCEDURE — 97140 MANUAL THERAPY 1/> REGIONS: CPT | Performed by: PHYSICAL THERAPIST

## 2018-02-23 NOTE — PROGRESS NOTES
Daily Note     Today's date: 2018  Patient name: Agustin Lyon  : 1980  MRN: [id number]  Referring provider: Steve Danielle DO  Dx:   Encounter Diagnosis     ICD-10-CM    1  Lumbar radiculopathy M54 16                   Subjective: Right low back and upper back were sore for the rest of the day and the next day after therapy but it is back to normal now  Had a chiropractic appointment yesterday and states that he was able to get more mobility in his legs  Objective: See treatment diary below  Manual   2--        Hamstring stretch 4 min x 6' x x        Psoas release 4 min  x x x        Manual hip flexor stretch off high table  5min x x x                                      Exercise Diary              Treadmill 12' 12' 12'  2 MPH x x        EOT hip flexor st 1' x2  x  x        Piriformis stretch 30" x2  3x  x        1/2 kneel hip flexor str 30" x2  x          HR leg press single  55#  3x10 x          Lunge walk with ball  4#  30ftx4 x  x        HR on step single   4" 10x  10 and 4        Biodex balance   catch          Squat - wt on heels - 15#    10  x6 No wt  [place] twist    10#  x6         Side glides to wall - left hip to wall    10x shoulder ext gray TB w/core 2x10        Cat-n-camel    30 Mid range 10x        Quadriped le kickouts    10"  10x x        bridges     5" 10x                                      Modalities                                                     X=same as last visit    Assessment: Started to get some mild right buttock pain with bridges after 5, kept the rest lower and they were painfree  Extension continues to cause nerve irritation  Plan: Continue per plan of care

## 2018-02-28 NOTE — PROGRESS NOTES
Assessment/Plan:      Diagnoses and all orders for this visit:    Lumbar herniated disc          Subjective:     Patient ID: Monica Serrano is a 41 y o  male  HPI  Follow-up of to January 4th visit when he was referred to Neurosurgery because of a foraminal herniation at L5-S1, and referral to his primary care provider because of bone marrow changes found on his MRI  Seeing chiropractor and Ptx,  Concerning about sensory disturbance which persists in the great toe on the left foot only  He has NOT seen his PCP as advised for eval of possible bone marrow issue as seen on MRI  Review of Systems   HENT: Positive for congestion and rhinorrhea  Gastrointestinal: Positive for diarrhea  Genitourinary: Negative  Musculoskeletal: Positive for back pain  Neurological: Positive for numbness  Objective:    Prior notes: Acute back pain, Bone marrow disorder, Central stenosis of spinal canal, Foraminal  stenosis of lumbosacral region    · Follow-up visit in 2 months Evaluation and Treatment  Follow-up  Status: Hold For -  Scheduling  Requested for: 69MVH1160  Bone marrow disorder    · 1 Armand Allen MD, Umesh Ge  Internal Medicine Co-Management  Please fu on bone marrow  issues  Status: Hold For - Scheduling  Requested for: 48BGU9326  () Care Summary provided  : Yes  Foraminal stenosis of lumbosacral region, Lumbar radiculitis, Lumbar spinal stenosis,  Weakness of left lower extremity    · 1 - Corey NEWMAN, [facility] Neurosurgery Co-Management  Re-eval after new  imaging   persistent weakness  Status: Active  Requested for: 63DFP2273  (MU) Care Summary provided  : Yes    NS Consult:     Mr Sharyle Paras is doing well with conservative treatment  we reviewed the role of PT, CHRISTIANO and medications  I see no compelling indications for surgery  I will see him on a PRN basis  Ptx: Assessment: Started to get some mild right buttock pain with bridges after 5, kept the rest lower and they were painfree    Extension continues to cause nerve irritation           Plan: Continue per plan of care       Neuro diagnostics:  EMG/ NCV was positive for left L5-S1 in July of 2017  Physical Exam   Neurological:   Reflex Scores:       Tricep reflexes are 1+ on the right side and 1+ on the left side  Bicep reflexes are 1+ on the right side and 1+ on the left side  Brachioradialis reflexes are 1+ on the right side and 1+ on the left side  Patellar reflexes are 0 on the right side and 0 on the left side  Achilles reflexes are 0 on the right side and 0 on the left side  No focal or lateralizing weakness

## 2018-03-01 ENCOUNTER — APPOINTMENT (OUTPATIENT)
Dept: LAB | Facility: CLINIC | Age: 38
End: 2018-03-01
Payer: COMMERCIAL

## 2018-03-01 ENCOUNTER — APPOINTMENT (OUTPATIENT)
Dept: PHYSICAL THERAPY | Facility: REHABILITATION | Age: 38
End: 2018-03-01
Payer: COMMERCIAL

## 2018-03-01 ENCOUNTER — OFFICE VISIT (OUTPATIENT)
Dept: PAIN MEDICINE | Facility: CLINIC | Age: 38
End: 2018-03-01
Payer: COMMERCIAL

## 2018-03-01 VITALS
WEIGHT: 315 LBS | HEART RATE: 68 BPM | SYSTOLIC BLOOD PRESSURE: 130 MMHG | HEIGHT: 73 IN | DIASTOLIC BLOOD PRESSURE: 80 MMHG | BODY MASS INDEX: 41.75 KG/M2

## 2018-03-01 DIAGNOSIS — M51.26 LUMBAR HERNIATED DISC: Primary | ICD-10-CM

## 2018-03-01 DIAGNOSIS — M51.26 LUMBAR HERNIATED DISC: ICD-10-CM

## 2018-03-01 LAB
BASOPHILS # BLD AUTO: 0.03 THOUSANDS/ΜL (ref 0–0.1)
BASOPHILS NFR BLD AUTO: 0 % (ref 0–1)
EOSINOPHIL # BLD AUTO: 0.6 THOUSAND/ΜL (ref 0–0.61)
EOSINOPHIL NFR BLD AUTO: 6 % (ref 0–6)
ERYTHROCYTE [DISTWIDTH] IN BLOOD BY AUTOMATED COUNT: 14 % (ref 11.6–15.1)
HCT VFR BLD AUTO: 49 % (ref 36.5–49.3)
HGB BLD-MCNC: 15.2 G/DL (ref 12–17)
LYMPHOCYTES # BLD AUTO: 2.5 THOUSANDS/ΜL (ref 0.6–4.47)
LYMPHOCYTES NFR BLD AUTO: 26 % (ref 14–44)
MCH RBC QN AUTO: 29.1 PG (ref 26.8–34.3)
MCHC RBC AUTO-ENTMCNC: 31 G/DL (ref 31.4–37.4)
MCV RBC AUTO: 94 FL (ref 82–98)
MONOCYTES # BLD AUTO: 0.77 THOUSAND/ΜL (ref 0.17–1.22)
MONOCYTES NFR BLD AUTO: 8 % (ref 4–12)
NEUTROPHILS # BLD AUTO: 5.87 THOUSANDS/ΜL (ref 1.85–7.62)
NEUTS SEG NFR BLD AUTO: 60 % (ref 43–75)
PLATELET # BLD AUTO: 253 THOUSANDS/UL (ref 149–390)
PMV BLD AUTO: 9.8 FL (ref 8.9–12.7)
RBC # BLD AUTO: 5.23 MILLION/UL (ref 3.88–5.62)
WBC # BLD AUTO: 9.77 THOUSAND/UL (ref 4.31–10.16)

## 2018-03-01 PROCEDURE — 99213 OFFICE O/P EST LOW 20 MIN: CPT | Performed by: PHYSICAL MEDICINE & REHABILITATION

## 2018-03-01 PROCEDURE — 85025 COMPLETE CBC W/AUTO DIFF WBC: CPT

## 2018-03-01 PROCEDURE — 36415 COLL VENOUS BLD VENIPUNCTURE: CPT

## 2018-03-01 RX ORDER — BLOOD-GLUCOSE METER
EACH MISCELLANEOUS DAILY
Status: ON HOLD | COMMUNITY
Start: 2017-05-15 | End: 2018-07-08

## 2018-03-01 RX ORDER — PREGABALIN 25 MG/1
25 CAPSULE ORAL 2 TIMES DAILY
Qty: 60 CAPSULE | Refills: 0 | Status: SHIPPED | OUTPATIENT
Start: 2018-03-01 | End: 2018-03-29 | Stop reason: SDUPTHER

## 2018-03-01 RX ORDER — METHOCARBAMOL 750 MG/1
TABLET, FILM COATED ORAL
Refills: 2 | Status: ON HOLD | COMMUNITY
Start: 2017-12-19 | End: 2018-07-08

## 2018-03-01 RX ORDER — IBUPROFEN 200 MG
TABLET ORAL
Status: ON HOLD | COMMUNITY
End: 2018-07-08

## 2018-03-01 NOTE — PATIENT INSTRUCTIONS
1  Trial of pregabalin for the nerve pain in the left foot  2  Follow up with her PCP regarding blood work and bone marrow  3   Follow-up 4 weeks  4   Continue physical therapy until next office visit

## 2018-03-01 NOTE — LETTER
March 1, 2018     Arlette Perry MD  [address] [place] [address]    Patient: Agustin Lyon   YOB: 1980   Date of Visit: 3/1/2018       Dear Dr Aayush Nogueira:    Thank you for referring Kenyetta Bower to me for evaluation  Below are the relevant portions of my assessment and plan of care  Diagnoses and all orders for this visit:    Lumbar herniated disc        If you have questions, please do not hesitate to call me  I look forward to following Bennett along with you           Sincerely,        Virgie Hall DO        CC: No Recipients

## 2018-03-02 ENCOUNTER — APPOINTMENT (OUTPATIENT)
Dept: PHYSICAL THERAPY | Facility: REHABILITATION | Age: 38
End: 2018-03-02
Payer: COMMERCIAL

## 2018-03-06 ENCOUNTER — APPOINTMENT (OUTPATIENT)
Dept: PHYSICAL THERAPY | Facility: REHABILITATION | Age: 38
End: 2018-03-06
Payer: COMMERCIAL

## 2018-03-09 ENCOUNTER — OFFICE VISIT (OUTPATIENT)
Dept: PHYSICAL THERAPY | Facility: REHABILITATION | Age: 38
End: 2018-03-09
Payer: COMMERCIAL

## 2018-03-09 DIAGNOSIS — M54.16 LUMBAR RADICULOPATHY: Primary | ICD-10-CM

## 2018-03-09 PROCEDURE — 97110 THERAPEUTIC EXERCISES: CPT

## 2018-03-09 PROCEDURE — 97140 MANUAL THERAPY 1/> REGIONS: CPT

## 2018-03-09 NOTE — PROGRESS NOTES
Daily Note     Today's date: 3/9/2018  Patient name: Socorro Sims  : 1980  MRN: [id number]  Referring provider: Harsha Vee DO  Dx:   Encounter Diagnosis     ICD-10-CM    1  Lumbar radiculopathy M54 16                   Subjective:  Reports he was going down the steps & he missed the last 2 & ray his back, he took some pain meds & does not have pain today  Missed some visits due to power outage at home  PRECAUTIONS:   Limit ext to mid range    Objective: See treatment diary below  Manual   2- 2- 3-9       Hamstring stretch 4 min x 6' x x x       Psoas release 4 min  x x x x       Manual hip flexor stretch off high table  5min x x x x                                     Exercise Diary              Treadmill 12' 12' 12'  2 MPH x x X 2 0   MPH x12'       EOT hip flexor st 1' x2  x  x x       Piriformis stretch 30" x2  3x  X seated x       1/2 kneel hip flexor str 30" x2  x          HR leg press single  55#  3x10 x          Lunge walk with ball  4#  30ftx4 x  x x       HR on step single   4" 10x  10 and 4 10 R ,  5 L       Biodex balance   catch          Squat - wt on heels - 15#    10  x6 No wt  20x       [place] twist    10#  x6         Side glides to wall - left hip to wall    10x shoulder ext gray TB w/core 2x10 x       Cat-n-camel    30 Mid range 10x x       Quadriped le kickouts    10"  10x x x       bridges     5" 10x 15                                     Modalities                                                     X=same as last visit    Assessment:   HR on L is improving  Corrected form for squat      Plan: Continue per plan of care

## 2018-03-13 ENCOUNTER — OFFICE VISIT (OUTPATIENT)
Dept: PHYSICAL THERAPY | Facility: REHABILITATION | Age: 38
End: 2018-03-13
Payer: COMMERCIAL

## 2018-03-13 DIAGNOSIS — M54.16 LUMBAR RADICULOPATHY: Primary | ICD-10-CM

## 2018-03-13 PROCEDURE — 97110 THERAPEUTIC EXERCISES: CPT

## 2018-03-13 PROCEDURE — 97140 MANUAL THERAPY 1/> REGIONS: CPT

## 2018-03-13 NOTE — PROGRESS NOTES
Daily Note     Today's date: 3/13/2018  Patient name: Noe Licona  : 1980  MRN: [id number]  Referring provider: Klaus Garcia DO  Dx:   Encounter Diagnosis     ICD-10-CM    1  Lumbar radiculopathy M54 16                   Subjective: Foot is gradually starting to feel better  PRECAUTIONS:   Limit ext to mid range    Objective: See treatment diary below  Manual   2- 2-20 2/23 3-9 3-13      Hamstring stretch 4 min x 6' x x x x      Psoas release 4 min  x x x x x      Manual hip flexor stretch off high table  5min x x x x x                                    Exercise Diary              Treadmill 12' 12' 12'  2 MPH x x X 2 0   MPH x12' x      EOT hip flexor st 1' x2  x  x x x      Piriformis stretch 30" x2  3x  X seated x x      1/2 kneel hip flexor str 30" x2  x          HR leg press single  55#  3x10 x          Lunge walk with ball  4#  30ftx4 x  x x x      HR on step single   4" 10x  10 and 4 10 R ,  5 L 15 R 10 L      Biodex balance   catch          Squat - wt on heels - 15#    10  x6 No wt  20x x      [place] twist    10#  x6         Side glides to wall - left hip to wall    10x shoulder ext gray TB w/core 2x10 x 30x      Cat-n-camel    30 Mid range 10x x 15      Quadriped le kickouts    10"  10x x x x      bridges     5" 10x 15 x                                    Modalities                                                     X=same as last visit    Assessment:   Did well with increases  HR cont to challenging      Plan: Continue per plan of care

## 2018-03-16 ENCOUNTER — OFFICE VISIT (OUTPATIENT)
Dept: PHYSICAL THERAPY | Facility: REHABILITATION | Age: 38
End: 2018-03-16
Payer: COMMERCIAL

## 2018-03-16 DIAGNOSIS — M54.16 LUMBAR RADICULOPATHY: Primary | ICD-10-CM

## 2018-03-16 PROCEDURE — 97110 THERAPEUTIC EXERCISES: CPT

## 2018-03-16 PROCEDURE — 97140 MANUAL THERAPY 1/> REGIONS: CPT

## 2018-03-16 NOTE — PROGRESS NOTES
Daily Note     Today's date: 3/16/2018  Patient name: Melvi Harrison  : 1980  MRN: [id number]  Referring provider: Niall Major DO  Dx:   Encounter Diagnosis     ICD-10-CM    1  Lumbar radiculopathy M54 16                   Subjective:  Less leg pain  Quads were sore from increase in weight with lunges last time  PRECAUTIONS:   Limit ext to mid range    Objective: See treatment diary below  Manual  2/9 2/13 2-16 2-20 2/23 3-9 3-13 3-16     Hamstring stretch 4 min x 6' x x x x x     Psoas release 4 min  x x x x x x     Manual hip flexor stretch off high table  5min x x x x x x                                   Exercise Diary              Treadmill 12' 12' 12'  2 MPH x x X 2 0   MPH x12' x x     EOT hip flexor st 1' x2  x  x x x x     Piriformis stretch 30" x2  3x  X seated x x x     1/2 kneel hip flexor str 30" x2  x          HR leg press single  55#  3x10 x          Lunge walk with ball  4#  30ftx4 x  x x X 7# 7#  x     HR on step single   4" 10x  10 and 4 10 R ,  5 L 15 R 10 L x     Biodex balance   catch          Squat - wt on heels - 15#    10  x6 No wt  20x x x     [place] twist    10#  x6         Side glides to wall - left hip to wall    10x shoulder ext gray TB w/core 2x10 x 30x [place] 35# x   20     Cat-n-camel    30 Mid range 10x x 15 x     Quadriped le kickouts    10"  10x x x x x     bridges     5" 10x 15 x 20                                   Modalities                                                     X=same as last visit    Assessment:  Tight B psoas  No c/o with increases  Better form for squats      Plan: Continue per plan of care

## 2018-03-19 ENCOUNTER — CLINICAL SUPPORT (OUTPATIENT)
Dept: INTERNAL MEDICINE CLINIC | Facility: CLINIC | Age: 38
End: 2018-03-19
Payer: COMMERCIAL

## 2018-03-19 DIAGNOSIS — E11.9 DIABETES MELLITUS WITHOUT COMPLICATION (HCC): ICD-10-CM

## 2018-03-19 DIAGNOSIS — R03.0 ELEVATED BLOOD PRESSURE READING WITHOUT DIAGNOSIS OF HYPERTENSION: Primary | ICD-10-CM

## 2018-03-19 PROCEDURE — 36415 COLL VENOUS BLD VENIPUNCTURE: CPT

## 2018-03-20 LAB — HBA1C MFR BLD: 5.3 % (ref 4.8–5.6)

## 2018-03-23 ENCOUNTER — OFFICE VISIT (OUTPATIENT)
Dept: INTERNAL MEDICINE CLINIC | Facility: CLINIC | Age: 38
End: 2018-03-23
Payer: COMMERCIAL

## 2018-03-23 ENCOUNTER — OFFICE VISIT (OUTPATIENT)
Dept: PHYSICAL THERAPY | Facility: REHABILITATION | Age: 38
End: 2018-03-23
Payer: COMMERCIAL

## 2018-03-23 VITALS
OXYGEN SATURATION: 91 % | DIASTOLIC BLOOD PRESSURE: 76 MMHG | HEART RATE: 84 BPM | SYSTOLIC BLOOD PRESSURE: 140 MMHG | HEIGHT: 74 IN | BODY MASS INDEX: 40.43 KG/M2 | WEIGHT: 315 LBS | TEMPERATURE: 98 F

## 2018-03-23 DIAGNOSIS — D75.9 BONE MARROW DISORDER: ICD-10-CM

## 2018-03-23 DIAGNOSIS — E11.8 TYPE 2 DIABETES MELLITUS WITH COMPLICATION, WITHOUT LONG-TERM CURRENT USE OF INSULIN (HCC): Primary | Chronic | ICD-10-CM

## 2018-03-23 DIAGNOSIS — M54.16 LUMBAR RADICULOPATHY: Primary | ICD-10-CM

## 2018-03-23 DIAGNOSIS — E66.01 MORBID OBESITY (HCC): Chronic | ICD-10-CM

## 2018-03-23 DIAGNOSIS — R09.81 NASAL CONGESTION: ICD-10-CM

## 2018-03-23 PROBLEM — L30.4 INTERTRIGO: Status: ACTIVE | Noted: 2017-09-14

## 2018-03-23 PROBLEM — Z13.31 POSITIVE DEPRESSION SCREENING: Status: ACTIVE | Noted: 2017-03-21

## 2018-03-23 PROBLEM — D58.2 ELEVATED HEMOGLOBIN (HCC): Status: ACTIVE | Noted: 2017-03-10

## 2018-03-23 PROBLEM — R29.898 WEAKNESS OF LEFT LOWER EXTREMITY: Status: ACTIVE | Noted: 2017-08-10

## 2018-03-23 PROBLEM — R03.0 ELEVATED BLOOD PRESSURE READING: Status: ACTIVE | Noted: 2017-03-02

## 2018-03-23 PROBLEM — M48.061 LUMBAR SPINAL STENOSIS: Status: ACTIVE | Noted: 2017-03-21

## 2018-03-23 PROBLEM — M48.07 FORAMINAL STENOSIS OF LUMBOSACRAL REGION: Status: ACTIVE | Noted: 2017-03-21

## 2018-03-23 PROBLEM — M48.00 CENTRAL STENOSIS OF SPINAL CANAL: Status: ACTIVE | Noted: 2017-04-13

## 2018-03-23 PROCEDURE — 97110 THERAPEUTIC EXERCISES: CPT

## 2018-03-23 PROCEDURE — 99213 OFFICE O/P EST LOW 20 MIN: CPT | Performed by: NURSE PRACTITIONER

## 2018-03-23 PROCEDURE — 97140 MANUAL THERAPY 1/> REGIONS: CPT

## 2018-03-23 PROCEDURE — 36415 COLL VENOUS BLD VENIPUNCTURE: CPT | Performed by: NURSE PRACTITIONER

## 2018-03-23 RX ORDER — CETIRIZINE HYDROCHLORIDE 10 MG/1
10 TABLET ORAL DAILY
Qty: 7 TABLET | Refills: 0 | Status: ON HOLD | COMMUNITY
Start: 2018-03-23 | End: 2018-07-08

## 2018-03-23 NOTE — PROGRESS NOTES
Daily Note     Today's date: 3/23/2018  Patient name: Sandi Gutierrez  : 1980  MRN: [id number]  Referring provider: Jeff Hwang DO  Dx:   Encounter Diagnosis     ICD-10-CM    1  Lumbar radiculopathy M54 16                   Subjective:  Did some shoveling & moved some moderately heavy furniture & had 3/10 LBP  He did take pain medication that night  Mild LBP today  PRECAUTIONS:   Limit ext to mid range    Objective: See treatment diary below  Manual  2/9 2/13 2-16 2-20 2/23 3-9 3-13 3-16 3-23    Hamstring stretch 4 min x 6' x x x x x x    Psoas release 4 min  x x x x x x x    Manual hip flexor stretch off high table  5min x x x x x x x                                  Exercise Diary              Treadmill 12' 12' 12'  2 MPH x x X 2 0   MPH x12' x x 2  5MPH x 12'    EOT hip flexor st 1' x2  x  x x x x x    Piriformis stretch 30" x2  3x  X seated x x x x    1/2 kneel hip flexor str 30" x2  x          HR leg press single  55#  3x10 x          Lunge walk with ball  4#  30ftx4 x  x x X 7# 7#  x x    HR on step single   4" 10x  10 and 4 10 R ,  5 L 15 R 10 L x x    Biodex balance   catch          Squat - wt on heels - 15#    10  x6 No wt  20x x x x    [place] twist    10#  x6         Side glides to wall - left hip to wall    10x shoulder ext gray TB w/core 2x10 x 30x Apollo 35# x   20 x    Cat-n-camel    30 Mid range 10x x 15 x x    Quadriped le kickouts    10"  10x x x x x x    bridges     5" 10x 15 x 20 30                                  Modalities                                                     X=same as last visit    Assessment:  L psoas is not as tight  No increase in soreness with ex      Plan: Continue per plan of care

## 2018-03-23 NOTE — PATIENT INSTRUCTIONS
Will have blood drawn today  Follow up in one week for results and further recommendations regarding bone marrow disorder  Continue to eat healthy and try to increase exercise as tolerated by back pain  Continue current Metformin dose  May be able to decrease dose if you continue to lose weight

## 2018-03-23 NOTE — PROGRESS NOTES
Assessment/Plan:     Diagnoses and all orders for this visit:    Type 2 diabetes mellitus with complication, without long-term current use of insulin (HCC)   Well controlled with most recent hemoglobin A1C of 5 3  Patient encouraged to continue a health diet and increase exercise (such as walking) as the weather improves and his back issues allow  Bone marrow disorder        Patient with MRI (December 2017) reading of "Markedly abnormal diffusely hypointense marrow, compatible with red marrow conversion versus other infiltrative process  Does the patient have anemia? Other etiologies including myelofibrosis could be considered although the spleen is not particularly enlarged  Diffuse metastases are also in the differential diagnosis although the uniform marrow signal hypointensity argues against this possibility  Further clinical correlation recommended  Discussed results with Dr Caroline Dela Cruz who recommended the below labs  Patient to follow up in 2 weeks  I told patient that I will call him with results as they come in as he is concerned and a bit anxious regarding this finding           -     Sedimentation rate, automated  -     LD (LDH), Body Fluid  -     Uric acid  -     Comprehensive metabolic panel  -     Protein electrophoresis, serum  -     Protein electrophoresis, urine    Morbid obesity ([address] )        Patient's weight is down a few pounds from 6 months ago  Encouraged patient continue with healthy eating and increasing activity as tolerated  Nasal congestion         Nasal congestion over the past 1-2 weeks  May be allergy related  Will try Zyrtec to see if this helps  Flonase may be an option as well  Subjective:      Patient ID: Zonia Narvaez is a 41 y o  male  Patient is here for 6 months follow up for his diabetes mellitus and morbid obesity  He states he has been watching his diet closely and he has continued with physical therapy  He is working his way back into [employer]  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  He follows with Dr Shira Rolle for his back and leg issues  He has recently been starting Lyrica  He never had sleep study performed as he cannot miss work to have study performed  Other than some nasal congestion and runny nose, he is feel well  He had an MRI ordered by Dr Shira Rolle at the end of December  He was told on his last office visit with Dr Shira Rolle that he may have a bone marrow issue  The following portions of the patient's history were reviewed and updated as appropriate: allergies, current medications, past family history, past medical history, past social history, past surgical history and problem list     Review of Systems   Constitutional: Negative for activity change, appetite change, chills, fatigue and fever  HENT: Positive for congestion  Negative for ear discharge, ear pain, postnasal drip, rhinorrhea and sore throat  Eyes: Negative  Respiratory: Negative for cough, chest tightness, shortness of breath and wheezing  Cardiovascular: Negative for chest pain, palpitations and leg swelling  Gastrointestinal: Negative for abdominal distention, abdominal pain, constipation, diarrhea, nausea and vomiting  Genitourinary: Negative for difficulty urinating, dysuria, frequency and urgency  Skin: Negative for color change, pallor and rash  Neurological: Negative for dizziness, syncope, numbness and headaches  Hematological: Negative for adenopathy  Does not bruise/bleed easily  Objective:    /76 (BP Location: Left arm, Patient Position: Sitting, Cuff Size: Large)   Pulse 84   Temp 98 °F (36 7 °C) (Oral)   Ht 6' 1 5" (1 867 m)   Wt (!) 170 kg (374 lb 12 8 oz)   SpO2 91%   BMI 48 78 kg/m²      Physical Exam   Constitutional: He is oriented to person, place, and time  He appears well-developed and well-nourished  Morbidly obese  HENT:   Head: Normocephalic and atraumatic     Right Ear: External ear normal    Left Ear: External ear normal    Nose: Nose normal    Mouth/Throat: Oropharynx is clear and moist  No oropharyngeal exudate  Eyes: Conjunctivae and EOM are normal  Pupils are equal, round, and reactive to light  Left eye exhibits no discharge  Neck: Normal range of motion  Neck supple  No thyromegaly present  Cardiovascular: Normal rate and regular rhythm  Distant heart tones   Pulmonary/Chest: Effort normal  He has no wheezes  He has no rales  Abdominal: Soft  Bowel sounds are normal  He exhibits no distension  There is no tenderness  Lymphadenopathy:     He has no cervical adenopathy  Neurological: He is alert and oriented to person, place, and time  Skin: Skin is warm and dry  No rash noted  No erythema  No pallor  Psychiatric: He has a normal mood and affect   His behavior is normal  Judgment and thought content normal

## 2018-03-27 ENCOUNTER — OFFICE VISIT (OUTPATIENT)
Dept: PHYSICAL THERAPY | Facility: REHABILITATION | Age: 38
End: 2018-03-27
Payer: COMMERCIAL

## 2018-03-27 DIAGNOSIS — M54.16 LUMBAR RADICULOPATHY: Primary | ICD-10-CM

## 2018-03-27 PROCEDURE — 97110 THERAPEUTIC EXERCISES: CPT | Performed by: PHYSICAL THERAPIST

## 2018-03-27 NOTE — PROGRESS NOTES
Daily Note     Today's date: 3/27/2018  Patient name: Robert Estrella  : 1980  MRN: [id number]  Referring provider: Salvador Feliciano DO  Dx:   Encounter Diagnosis     ICD-10-CM    1  Lumbar radiculopathy M54 16                   Subjective:  Some back pain when dealing on  because he had to lean over for long hours  Was able to sit most of his shift last night so doing better  PRECAUTIONS:   Limit ext to mid range    Objective: See treatment diary below  Manual  2/9 2/13 2-16 2-20 2/23 3-9 3-13 3-16 3-23 3-27   Hamstring stretch 4 min x 6' x x x x x x x   Psoas release 4 min  x x x x x x x x   Manual hip flexor stretch off high table  5min x x x x x x x x                                 Exercise Diary              Treadmill 12' 12' 12'  2 MPH x x X 2 0   MPH x12' x x 2  5MPH x 12' 10'   EOT hip flexor st 1' x2  x  x x x x x x   Piriformis stretch 30" x2  3x  X seated x x x x x   1/2 kneel hip flexor str 30" x2  x          HR leg press single  55#  3x10 x          Lunge walk with ball  4#  30ftx4 x  x x X 7# 7#  x x x   HR on step single   4" 10x  10 and 4 10 R ,  5 L 15 R 10 L x x x   Biodex balance   catch          Squat - wt on heels - 15#    10  x6 No wt  20x x x x x   [place] twist    10#  x6         Side glides to wall - left hip to wall    10x shoulder ext gray TB w/core 2x10 x 30x Apollo 35# x   20 x x   Cat-n-camel    30 Mid range 10x x 15 x x x   Quadriped le kickouts    10"  10x x x x x x x   bridges     5" 10x 15 x 20 30 x                                 Modalities                                                     X=same as last visit    Assessment:  Pt  Is being assessed for spinal cancer  Held on Massage for now  Plan: Continue per plan of care

## 2018-03-28 LAB
ALBUMIN 24H MFR UR ELPH: 30.2 %
ALBUMIN SERPL ELPH-MCNC: 3.4 G/DL (ref 2.9–4.4)
ALBUMIN SERPL-MCNC: 4.1 G/DL (ref 3.5–5.5)
ALBUMIN/GLOB SERPL: 1 {RATIO} (ref 0.7–1.7)
ALBUMIN/GLOB SERPL: 1.5 {RATIO} (ref 1.2–2.2)
ALP SERPL-CCNC: 86 IU/L (ref 39–117)
ALPHA1 GLOB 24H MFR UR ELPH: 4.5 %
ALPHA1 GLOB SERPL ELPH-MCNC: 0.2 G/DL (ref 0–0.4)
ALPHA2 GLOB 24H MFR UR ELPH: 14.6 %
ALPHA2 GLOB SERPL ELPH-MCNC: 0.7 G/DL (ref 0.4–1)
ALT SERPL-CCNC: 64 IU/L (ref 0–44)
AST SERPL-CCNC: 33 IU/L (ref 0–40)
B-GLOBULIN 24H MFR UR ELPH: 30.6 %
B-GLOBULIN SERPL ELPH-MCNC: 1 G/DL (ref 0.7–1.3)
BILIRUB SERPL-MCNC: 0.5 MG/DL (ref 0–1.2)
BUN SERPL-MCNC: 12 MG/DL (ref 6–20)
BUN/CREAT SERPL: 15 (ref 9–20)
CALCIUM SERPL-MCNC: 9.1 MG/DL (ref 8.7–10.2)
CHLORIDE SERPL-SCNC: 98 MMOL/L (ref 96–106)
CO2 SERPL-SCNC: 31 MMOL/L (ref 18–29)
CREAT SERPL-MCNC: 0.8 MG/DL (ref 0.76–1.27)
ERYTHROCYTE [SEDIMENTATION RATE] IN BLOOD BY WESTERGREN METHOD: 14 MM/HR (ref 0–15)
GAMMA GLOB 24H MFR UR ELPH: 20.2 %
GAMMA GLOB SERPL ELPH-MCNC: 1.5 G/DL (ref 0.4–1.8)
GLOBULIN SER CALC-MCNC: 3.5 G/DL (ref 2.2–3.9)
GLOBULIN SER-MCNC: 2.8 G/DL (ref 1.5–4.5)
GLUCOSE SERPL-MCNC: 86 MG/DL (ref 65–99)
IMMUNOFIXATION ELECTROPHORESIS 1 URINE: NORMAL
LABORATORY COMMENT REPORT: NORMAL
LDH SERPL-CCNC: 198 IU/L (ref 121–224)
M PROTEIN 24H MFR UR ELPH: NORMAL %
M PROTEIN SERPL ELPH-MCNC: NORMAL G/DL
POTASSIUM SERPL-SCNC: 5.1 MMOL/L (ref 3.5–5.2)
PROT SERPL-MCNC: 6.9 G/DL (ref 6–8.5)
PROT UR-MCNC: 12.5 MG/DL
SL AMB EGFR AFRICAN AMERICAN: 133 ML/MIN/1.73
SL AMB EGFR NON AFRICAN AMERICAN: 115 ML/MIN/1.73
SL AMB NOTE:: NORMAL
SODIUM SERPL-SCNC: 141 MMOL/L (ref 134–144)
URATE SERPL-MCNC: 5.1 MG/DL (ref 3.7–8.6)

## 2018-03-28 NOTE — PROGRESS NOTES
Assessment/Plan:      Diagnoses and all orders for this visit:    Weakness of left lower extremity    Lumbar radiculitis          Subjective:     Patient ID: Mini Pizarro is a 41 y o  male  HPI  3 [id number] follow-up for left lower extremity weakness due to  lumbar radiculopathy  He is continuing physical therapy for strengthening, he saw his primary care provider and has been sent for laboratory studies due to marrow abnormalities on MRI  Has returned to bowling, feels he is getting stronger in P[place]  Lab work in progress thru PCP   Sensation returning to left toe  Review of Systems   HENT: Positive for congestion, postnasal drip and rhinorrhea  Gastrointestinal: Negative  Genitourinary: Negative  Neurological: Positive for weakness  Objective:   PT Notes:   Subjective:  Some back pain when dealing on Sunday because he had to lean over for long hours  Was able to sit most of his shift last night so doing better         Physical Exam   Neurological:   Reflex Scores:       Patellar reflexes are 1+ on the right side and 1+ on the left side  Achilles reflexes are 0 on the right side and 0 on the left side

## 2018-03-29 ENCOUNTER — OFFICE VISIT (OUTPATIENT)
Dept: PAIN MEDICINE | Facility: CLINIC | Age: 38
End: 2018-03-29
Payer: COMMERCIAL

## 2018-03-29 VITALS
HEART RATE: 88 BPM | BODY MASS INDEX: 41.75 KG/M2 | WEIGHT: 315 LBS | SYSTOLIC BLOOD PRESSURE: 128 MMHG | DIASTOLIC BLOOD PRESSURE: 80 MMHG | HEIGHT: 73 IN

## 2018-03-29 DIAGNOSIS — M51.26 LUMBAR HERNIATED DISC: ICD-10-CM

## 2018-03-29 DIAGNOSIS — R29.898 WEAKNESS OF LEFT LOWER EXTREMITY: Primary | ICD-10-CM

## 2018-03-29 DIAGNOSIS — M54.16 LUMBAR RADICULITIS: ICD-10-CM

## 2018-03-29 PROCEDURE — 99213 OFFICE O/P EST LOW 20 MIN: CPT | Performed by: PHYSICAL MEDICINE & REHABILITATION

## 2018-03-29 RX ORDER — PREGABALIN 25 MG/1
25 CAPSULE ORAL 2 TIMES DAILY
Qty: 60 CAPSULE | Refills: 0 | Status: SHIPPED | OUTPATIENT
Start: 2018-03-29 | End: 2018-05-03

## 2018-03-30 ENCOUNTER — OFFICE VISIT (OUTPATIENT)
Dept: PHYSICAL THERAPY | Facility: REHABILITATION | Age: 38
End: 2018-03-30
Payer: COMMERCIAL

## 2018-03-30 DIAGNOSIS — M54.16 LUMBAR RADICULOPATHY: Primary | ICD-10-CM

## 2018-03-30 PROCEDURE — 97140 MANUAL THERAPY 1/> REGIONS: CPT | Performed by: PHYSICAL THERAPIST

## 2018-03-30 PROCEDURE — 97110 THERAPEUTIC EXERCISES: CPT | Performed by: PHYSICAL THERAPIST

## 2018-03-30 NOTE — PROGRESS NOTES
Daily Note     Today's date: 3/30/2018  Patient name: Yassine Posadas  : 1980  MRN: [id number]  Referring provider: Christine Snowden DO  Dx:   Encounter Diagnosis     ICD-10-CM    1  Lumbar radiculopathy M54 16                   Subjective:  Concerned about standing and bending for work activities  Had a little bit of low back pain with bowling training earlier this week but it resolved with repeated extension  Will be trying a full game this coming week  PRECAUTIONS:   Limit ext to mid range    Objective: See treatment diary below  Manual  3- 2- 2- 3-9 3-13 3-16 3-23 3   Hamstring stretch 4 min x 6' x x x x x x x   Psoas release 4 min  x x x x x x x x   Manual hip flexor stretch off high table 4 min 5min x x x x x x x x                                 Exercise Diary              Treadmill 12' 12' 12'  2 MPH x x X 2 0   MPH x12' x x 2  5MPH x 12' 10'   EOT hip flexor st 1' x2  x  x x x x x x   Piriformis stretch 30" x2  3x  X seated x x x x x   1/2 kneel hip flexor str 30" x2  x          HR leg press single  55#  3x10 x          Lunge walk with ball 7# ball 30ftx4 4#  30ftx4 x  x x X 7# 7#  x x x   HR on step single R 15x L 10x  4" 10x  10 and 4 10 R ,  5 L 15 R 10 L x x x   Biodex balance   catch          Squat 20x   10  x6 No wt  20x x x x x   [place] twist    10#  x6         Shoulder ext w/core apollo 35# 20   10x shoulder ext gray TB w/core 2x10 x 30x Apollo 35# x   20 x x   Cat-n-camel    30 Mid range 10x x 15 x x x   Quadriped le kickouts    10"  10x x x x x x x   bridges 30    5" 10x 15 x 20 30 x   Forward bending with wand for cuing 10x            Bent forward 2# 3x10                Modalities                                                     X=same as last visit    Assessment:  Left Heel raises are still very difficult  Had difficulty maintaining neutral spine with forward bending  He was able to get it with cues  Plan: Continue per plan of care

## 2018-04-03 ENCOUNTER — EVALUATION (OUTPATIENT)
Dept: PHYSICAL THERAPY | Facility: REHABILITATION | Age: 38
End: 2018-04-03
Payer: COMMERCIAL

## 2018-04-03 ENCOUNTER — TRANSCRIBE ORDERS (OUTPATIENT)
Dept: INTERNAL MEDICINE CLINIC | Facility: CLINIC | Age: 38
End: 2018-04-03

## 2018-04-03 DIAGNOSIS — M54.16 LUMBAR RADICULOPATHY: Primary | ICD-10-CM

## 2018-04-03 PROCEDURE — 97110 THERAPEUTIC EXERCISES: CPT | Performed by: PHYSICAL THERAPIST

## 2018-04-03 NOTE — PROGRESS NOTES
Daily Note     Today's date: 4/3/2018  Patient name: Chio Jovel  : 1980  MRN: [id number]  Referring provider: Jolanta Montenegro DO  Dx:   Encounter Diagnosis     ICD-10-CM    1  Lumbar radiculopathy M54 16                   Subjective:  Pt  Was able bowl without back pain  Still feels weakness in his left calf - he thinks it about 70% of his right  Pt  Reporting a consistent reduction of foot numbness  PRECAUTIONS:   Limit ext to mid range    Objective: See treatment diary below  Manual  3-30 4-3 2-16 2-20  3-9 3-13 3-16 3-23 3-27   Hamstring stretch 4 min            Psoas release 4 min            Manual hip flexor stretch off high table 4 min                                          Exercise Diary              Treadmill 12' 12'           EOT hip flexor st 1' x2 x           Piriformis stretch 30" x2 x           1/2 kneel hip flexor str 30" x2 x           HR leg press single  55#  3x10           Lunge walk with ball 7# ball 30ftx4 7#  30ftx4           HR on step single R 15x L 10x x           Biodex balance             Squat 20x x           [place] twist             Shoulder ext w/core apollo 35# 20 x           Kneeling hip flexor/quad stretch  1 min  x3           Standing or kneeling one leg hamstring stretch  1 min  x3           bridges 30 x           Forward bending with wand for cuing 10x x           Bent forward  Rows 2# 3x10 15#  3x15               Modalities                                                     X=same as last visit    Assessment:  Left calf strength 3+/5 compared to right at 5/5  Pt  Was instructed in hep more aggressive  Stretching along with precautions and limitations  He demonstrated good technique with same        Plan: Plan to transition to independent hep/fitness program

## 2018-04-04 NOTE — PROGRESS NOTES
Ana Israel has been compliant with attending PT and home exercise program since initial eval   Dominica Goodpasture  has made improvements in objective data since initial evalulation and has achieved all goals  Patient reports having returned to their prior level or function  Patient provided with updated Home Exercise Program, all questions answered, verbalized understanding and agreement to plan of care   Thus it was mutually decided to discontinue this episode of care and transition to Home Exercise Program

## 2018-04-12 ENCOUNTER — OFFICE VISIT (OUTPATIENT)
Dept: INTERNAL MEDICINE CLINIC | Facility: CLINIC | Age: 38
End: 2018-04-12
Payer: COMMERCIAL

## 2018-04-12 VITALS
OXYGEN SATURATION: 94 % | TEMPERATURE: 98.2 F | HEART RATE: 84 BPM | BODY MASS INDEX: 40.43 KG/M2 | WEIGHT: 315 LBS | SYSTOLIC BLOOD PRESSURE: 134 MMHG | DIASTOLIC BLOOD PRESSURE: 84 MMHG | HEIGHT: 74 IN

## 2018-04-12 DIAGNOSIS — D75.9 BONE MARROW DISORDER: Primary | ICD-10-CM

## 2018-04-12 DIAGNOSIS — E66.01 MORBID OBESITY (HCC): Chronic | ICD-10-CM

## 2018-04-12 DIAGNOSIS — M54.41 ACUTE RIGHT-SIDED LOW BACK PAIN WITH BILATERAL SCIATICA: ICD-10-CM

## 2018-04-12 DIAGNOSIS — M54.42 ACUTE RIGHT-SIDED LOW BACK PAIN WITH BILATERAL SCIATICA: ICD-10-CM

## 2018-04-12 PROCEDURE — 99214 OFFICE O/P EST MOD 30 MIN: CPT | Performed by: PHYSICIAN ASSISTANT

## 2018-04-12 NOTE — PATIENT INSTRUCTIONS
MRI previously noted December 2017with abnormal finding of hypodense marrow signal   Reviewed labs since  ESR, LDH, Uric acid were normal   CMP slight increased ALT and carbon dioxide  SPEP and UPEP noted  Needs MILLICENT further eval   Discussed importance of further eval   Patient is leaving on vacation, returns 4/20/18  Will set up [address] for 4/26/18  Hold on chiropractor at this time

## 2018-04-12 NOTE — PROGRESS NOTES
[facility] [address] [place] [address]  Standard Office Visit  Patient ID: Curtis Vail    : 1980  Age/Gender: 41 y o  male     DATE: 2018      Assessment/Plan:    Bone marrow disorder  MRI previously noted 2017with abnormal finding of hypodense marrow signal   Reviewed labs since  ESR, LDH, Uric acid were normal   CMP slight increased ALT and carbon dioxide  SPEP and UPEP noted  I discussed with patient at length importance of Hematology-Oncology evaluation to further determine the cause of this abnormality seen on his low back MRI  Patient relies understanding and is willing but will be leaving for vacation  He returns 18  Our office staff will set up [address] for 18  Advised he stopped going to chiropractor at this time until further evaluated by Hematology Oncology    Morbid obesity ([address] )   Commended patient on weight loss  Given   Encouragement    Acute right-sided low back pain with sciatica and paresthesia   Back pain improving with weight loss  MRI reviewed  Patient recently had EMG  Scheduled with Hematology Oncology  Discussed if symptoms persist may need pain management consult for low back injections       Diagnoses and all orders for this visit:    Bone marrow disorder  -     Ambulatory referral to Hematology / Oncology; Future    Morbid obesity ([address] )    Acute right-sided low back pain with bilateral sciatica          Subjective:   Chief Complaint   Patient presents with    Follow-up     Review blood work form 2018  PT stated no complaints          Curtis Vail is a 41 y o  male who presents to the office on 2018 for     40 yo male  Notes he slipped disc, 3/2017  Given oral medications  In the office but later had a worsening of his back pain  Eventually developed numbness and tingling that was radiating down to his legs and in his genital area    He was sent to the emergency room and was Treated nonsurgically  His symptoms improved but he was not able to get an MRI  He had a CT and a CT myelogram   He also had epidural injections  He no longer is following for any back injections  He has been trying to lose weight trying to eat more healthy  Has made significant weight loss  He recently had an EMG done due to persistent numbness in his left great toe that patient notes has been improving  He had an MRI done in 2017 December which was abnormal and for that reason he was seen in our office and was sent for additional labs  Follows with chiropractor  Saw his chiropractor today  Notes he thinks pain is improving        No previous history of any bone marrow problems  No fevers no chills  No intentional weight loss  No night sweats  Notes his back pain is improving  Wants to   Discussed his test results    Back Pain   This is a chronic problem  The current episode started more than 1 year ago  The problem has been gradually improving since onset  The pain is present in the lumbar spine  Radiates to: Head times radiates to his legs  At times gets left great toe numbness comes and goes  The pain is mild  Pertinent negatives include no abdominal pain, chest pain, dysuria, fever, headaches or weakness  Risk factors include lack of exercise and obesity  The following portions of the patient's history were reviewed and updated as appropriate: allergies, current medications, past family history, past medical history, past social history, past surgical history and problem list     Review of Systems   Constitutional: Positive for activity change ( trying to be more active) and appetite change ( trying to be more healthy)  Negative for chills, fatigue, fever and unexpected weight change  Respiratory: Negative for cough and shortness of breath  Cardiovascular: Negative for chest pain and palpitations     Gastrointestinal: Negative for abdominal pain, constipation, diarrhea, nausea and vomiting  Genitourinary: Negative for dysuria  Musculoskeletal: Positive for back pain  Skin: Negative for rash  Neurological: Negative for dizziness, syncope, weakness, light-headedness and headaches  Psychiatric/Behavioral: Negative for agitation           Patient Active Problem List   Diagnosis    [facility] )    Morbid obesity ([address] )    Acute right-sided low back pain with sciatica and paresthesia    Bone marrow disorder    Central stenosis of spinal canal    Elevated blood pressure reading    Elevated hemoglobin (HCC)    Foraminal stenosis of lumbosacral region    Intertrigo    Lumbar radiculitis    Lumbar spinal stenosis    Positive depression screening    Weakness of left lower extremity       Past Medical History:   Diagnosis Date    Diabetes mellitus ([facility] )     Diabetes mellitus ([facility] )     other type with circulatory complication, without long-term current    Hearing loss     r ear only    Obesity     Onychomycosis of multiple toenails with type 2 diabetes mellitus ([facility] )     last assessed: 3/21/2017       Past Surgical History:   Procedure Laterality Date    MOUTH SURGERY  12/28/2017    TONSILLECTOMY AND ADENOIDECTOMY           Current Outpatient Prescriptions:     Blood Glucose Monitoring Suppl (ONE TOUCH ULTRA 2) w/Device KIT, by Does not apply route daily, Disp: , Rfl:     metFORMIN (GLUCOPHAGE) 500 mg tablet, Take 500 mg by mouth 2 (two) times a day with meals, Disp: , Rfl:     methocarbamol (ROBAXIN) 750 mg tablet, TAKE 1 TO 2 TABLETS 3 TIMES DAILY AS NEEDED , Disp: , Rfl: 2    pregabalin (LYRICA) 25 mg capsule, Take 1 capsule (25 mg total) by mouth 2 (two) times a day, Disp: 60 capsule, Rfl: 0    cetirizine (ZyrTEC) 10 mg tablet, Take 1 tablet (10 mg total) by mouth daily for 7 days, Disp: 7 tablet, Rfl: 0    ibuprofen (ADVIL) 200 mg tablet, Take by mouth, Disp: , Rfl:     Allergies   Allergen Reactions    Menthol Rash       Social History     Social History    Marital status: Single     Spouse name: N/A    Number of children: N/A    Years of education: N/A     Occupational History    employed      Social History Main Topics    Smoking status: Never Smoker    Smokeless tobacco: Never Used    Alcohol use Yes      Comment: 1x weekly; social alcohol use (as per allscripts)    Drug use: No    Sexual activity: Not on file     Other Topics Concern    Not on file     Social History Narrative    Lives with parents           Family History   Problem Relation Age of Onset    Diabetes Mother     Stomach cancer Other        PHQ-9 Depression Screening    PHQ-9:    Frequency of the following problems over the past two weeks:              Health Maintenance   Topic Date Due    HIV SCREENING  1980    PNEUMOCOCCAL POLYSACCHARIDE VACCINE AGE 2-64 HIGH RISK  02/26/1984    OPHTHALMOLOGY EXAM  02/26/1992    URINE MICROALBUMIN  02/26/1992    DTaP,Tdap,and Td Vaccines (1 - Tdap) 02/26/2003    INFLUENZA VACCINE  09/01/2017    Depression Screening PHQ-9  02/09/2019    Diabetic Foot Exam  03/23/2019       Immunization History   Administered Date(s) Administered    Influenza Quadrivalent Preservative Free 3 years and older IM 11/02/2016        Objective:  Vitals:    04/12/18 1543   BP: 134/84   BP Location: Left arm   Patient Position: Sitting   Cuff Size: Large   Pulse: 84   Temp: 98 2 °F (36 8 °C)   TempSrc: Oral   SpO2: 94%   Weight: (!) 169 kg (373 lb 6 4 oz)   Height: 6' 1 5" (1 867 m)     Wt Readings from Last 3 Encounters:   04/12/18 (!) 169 kg (373 lb 6 4 oz)   03/29/18 (!) 171 kg (376 lb)   03/23/18 (!) 170 kg (374 lb 12 8 oz)     Body mass index is 48 6 kg/m²  No exam data present       Physical Exam   Constitutional: He is oriented to person, place, and time  He appears well-developed and well-nourished  No distress  Morbidly obese 51-year-old male seated in room appears concerned about his blood work   HENT:   Head: Normocephalic and atraumatic  Mouth/Throat: Oropharynx is clear and moist  No oropharyngeal exudate  Eyes: Conjunctivae are normal  Pupils are equal, round, and reactive to light  Right eye exhibits no discharge  Left eye exhibits no discharge  No scleral icterus  Neck: Neck supple  Cardiovascular: Normal rate, regular rhythm and normal heart sounds  No murmur heard  Pulmonary/Chest: Effort normal and breath sounds normal  No respiratory distress  He has no wheezes  Clear to auscultation throughout no crackles rhonchi wheeze   Abdominal: Soft  Bowel sounds are normal  There is no tenderness  Positive bowel sounds  Body habitus limits exam   No palpable organomegaly   Musculoskeletal: He exhibits no edema  Low back pain with straight leg raise  Normal great toe position sounds bilaterally  Normal vibratory sense bilaterally lower extremity  Symmetric patellar tendon reflexes   Neurological: He is alert and oriented to person, place, and time  No gross focal neurologic deficits on exam   Reduce forward flexion of LS spine   Skin: Skin is warm and dry  No rash noted  He is not diaphoretic  Psychiatric: He has a normal mood and affect  His behavior is normal  Judgment and thought content normal    Nursing note and vitals reviewed            Future Appointments  Date Time Provider Diogenes Miracle   5/3/2018 10:00 AM Raquel Cramer DO [facility]       Denzel Braxton PA-C  [address] [facility] [address] [place] [address]    Patient Care Team:  Yadira Allred MD as PCP - General  DO Raquel Ang, MD Yadira Dee MD

## 2018-04-12 NOTE — ASSESSMENT & PLAN NOTE
MRI previously noted December 2017with abnormal finding of hypodense marrow signal   Reviewed labs since  ESR, LDH, Uric acid were normal   CMP slight increased ALT and carbon dioxide  SPEP and UPEP noted  I discussed with patient at length importance of Hematology-Oncology evaluation to further determine the cause of this abnormality seen on his low back MRI  Patient relies understanding and is willing but will be leaving for vacation  He returns 4/20/18  Our office staff will set up [address] for 4/26/18  Advised he stopped going to chiropractor at this time until further evaluated by Hematology Oncology  Report back if any difficulty scheduling    Discussed case with Dr Wolm Riedel

## 2018-04-13 NOTE — ASSESSMENT & PLAN NOTE
Back pain improving with weight loss  MRI reviewed  Patient recently had EMG  Scheduled with Hematology Oncology    Discussed if symptoms persist may need pain management consult for low back injections

## 2018-04-19 ENCOUNTER — APPOINTMENT (EMERGENCY)
Dept: RADIOLOGY | Facility: HOSPITAL | Age: 38
End: 2018-04-19
Payer: COMMERCIAL

## 2018-04-19 ENCOUNTER — HOSPITAL ENCOUNTER (OUTPATIENT)
Facility: HOSPITAL | Age: 38
Setting detail: OBSERVATION
Discharge: HOME | End: 2018-04-19
Attending: EMERGENCY MEDICINE | Admitting: SURGERY
Payer: COMMERCIAL

## 2018-04-19 ENCOUNTER — APPOINTMENT (OUTPATIENT)
Dept: RADIOLOGY | Facility: HOSPITAL | Age: 38
End: 2018-04-19
Payer: COMMERCIAL

## 2018-04-19 VITALS
RESPIRATION RATE: 18 BRPM | OXYGEN SATURATION: 94 % | HEART RATE: 65 BPM | SYSTOLIC BLOOD PRESSURE: 126 MMHG | TEMPERATURE: 98 F | DIASTOLIC BLOOD PRESSURE: 60 MMHG | HEIGHT: 73 IN | BODY MASS INDEX: 41.75 KG/M2 | WEIGHT: 315 LBS

## 2018-04-19 DIAGNOSIS — K80.20 CALCULUS OF GALLBLADDER WITHOUT CHOLECYSTITIS WITHOUT OBSTRUCTION: ICD-10-CM

## 2018-04-19 DIAGNOSIS — K80.20 CHOLELITHIASIS: Primary | ICD-10-CM

## 2018-04-19 LAB
ALBUMIN SERPL BCP-MCNC: 3.2 G/DL (ref 3.5–5)
ALBUMIN SERPL BCP-MCNC: 3.2 G/DL (ref 3.5–5)
ALP SERPL-CCNC: 95 U/L (ref 46–116)
ALP SERPL-CCNC: 97 U/L (ref 46–116)
ALT SERPL W P-5'-P-CCNC: 149 U/L (ref 12–78)
ALT SERPL W P-5'-P-CCNC: 211 U/L (ref 12–78)
ANION GAP SERPL CALCULATED.3IONS-SCNC: 2 MMOL/L (ref 4–13)
ANION GAP SERPL CALCULATED.3IONS-SCNC: 4 MMOL/L (ref 4–13)
AST SERPL W P-5'-P-CCNC: 138 U/L (ref 5–45)
AST SERPL W P-5'-P-CCNC: 157 U/L (ref 5–45)
BACTERIA UR QL AUTO: NORMAL /HPF
BASOPHILS # BLD AUTO: 0.01 THOUSANDS/ΜL (ref 0–0.1)
BASOPHILS # BLD AUTO: 0.02 THOUSANDS/ΜL (ref 0–0.1)
BASOPHILS NFR BLD AUTO: 0 % (ref 0–1)
BASOPHILS NFR BLD AUTO: 0 % (ref 0–1)
BILIRUB SERPL-MCNC: 0.32 MG/DL (ref 0.2–1)
BILIRUB SERPL-MCNC: 0.39 MG/DL (ref 0.2–1)
BILIRUB UR QL STRIP: NEGATIVE
BUN SERPL-MCNC: 12 MG/DL (ref 5–25)
BUN SERPL-MCNC: 13 MG/DL (ref 5–25)
CALCIUM SERPL-MCNC: 8.2 MG/DL (ref 8.3–10.1)
CALCIUM SERPL-MCNC: 8.6 MG/DL (ref 8.3–10.1)
CHLORIDE SERPL-SCNC: 103 MMOL/L (ref 100–108)
CHLORIDE SERPL-SCNC: 106 MMOL/L (ref 100–108)
CLARITY UR: CLEAR
CO2 SERPL-SCNC: 31 MMOL/L (ref 21–32)
CO2 SERPL-SCNC: 34 MMOL/L (ref 21–32)
COLOR UR: YELLOW
COLOR, POC: NORMAL
CREAT SERPL-MCNC: 0.66 MG/DL (ref 0.6–1.3)
CREAT SERPL-MCNC: 0.82 MG/DL (ref 0.6–1.3)
EOSINOPHIL # BLD AUTO: 0.31 THOUSAND/ΜL (ref 0–0.61)
EOSINOPHIL # BLD AUTO: 0.36 THOUSAND/ΜL (ref 0–0.61)
EOSINOPHIL NFR BLD AUTO: 3 % (ref 0–6)
EOSINOPHIL NFR BLD AUTO: 3 % (ref 0–6)
ERYTHROCYTE [DISTWIDTH] IN BLOOD BY AUTOMATED COUNT: 13.2 % (ref 11.6–15.1)
ERYTHROCYTE [DISTWIDTH] IN BLOOD BY AUTOMATED COUNT: 13.3 % (ref 11.6–15.1)
GFR SERPL CREATININE-BSD FRML MDRD: 114 ML/MIN/1.73SQ M
GFR SERPL CREATININE-BSD FRML MDRD: 124 ML/MIN/1.73SQ M
GLUCOSE SERPL-MCNC: 103 MG/DL (ref 65–140)
GLUCOSE SERPL-MCNC: 110 MG/DL (ref 65–140)
GLUCOSE UR STRIP-MCNC: NEGATIVE MG/DL
HCT VFR BLD AUTO: 46 % (ref 36.5–49.3)
HCT VFR BLD AUTO: 47.2 % (ref 36.5–49.3)
HGB BLD-MCNC: 15.2 G/DL (ref 12–17)
HGB BLD-MCNC: 15.6 G/DL (ref 12–17)
HGB UR QL STRIP.AUTO: NEGATIVE
HYALINE CASTS #/AREA URNS LPF: NORMAL /LPF
KETONES UR STRIP-MCNC: NEGATIVE MG/DL
LEUKOCYTE ESTERASE UR QL STRIP: NEGATIVE
LIPASE SERPL-CCNC: 98 U/L (ref 73–393)
LYMPHOCYTES # BLD AUTO: 1.96 THOUSANDS/ΜL (ref 0.6–4.47)
LYMPHOCYTES # BLD AUTO: 2.17 THOUSANDS/ΜL (ref 0.6–4.47)
LYMPHOCYTES NFR BLD AUTO: 17 % (ref 14–44)
LYMPHOCYTES NFR BLD AUTO: 21 % (ref 14–44)
MCH RBC QN AUTO: 30.8 PG (ref 26.8–34.3)
MCH RBC QN AUTO: 30.9 PG (ref 26.8–34.3)
MCHC RBC AUTO-ENTMCNC: 33 G/DL (ref 31.4–37.4)
MCHC RBC AUTO-ENTMCNC: 33.1 G/DL (ref 31.4–37.4)
MCV RBC AUTO: 93 FL (ref 82–98)
MCV RBC AUTO: 94 FL (ref 82–98)
MONOCYTES # BLD AUTO: 0.76 THOUSAND/ΜL (ref 0.17–1.22)
MONOCYTES # BLD AUTO: 0.78 THOUSAND/ΜL (ref 0.17–1.22)
MONOCYTES NFR BLD AUTO: 7 % (ref 4–12)
MONOCYTES NFR BLD AUTO: 7 % (ref 4–12)
NEUTROPHILS # BLD AUTO: 7.22 THOUSANDS/ΜL (ref 1.85–7.62)
NEUTROPHILS # BLD AUTO: 8.3 THOUSANDS/ΜL (ref 1.85–7.62)
NEUTS SEG NFR BLD AUTO: 69 % (ref 43–75)
NEUTS SEG NFR BLD AUTO: 73 % (ref 43–75)
NITRITE UR QL STRIP: NEGATIVE
NON-SQ EPI CELLS URNS QL MICRO: NORMAL /HPF
NRBC BLD AUTO-RTO: 0 /100 WBCS
NRBC BLD AUTO-RTO: 0 /100 WBCS
PH UR STRIP.AUTO: 8.5 [PH] (ref 4.5–8)
PLATELET # BLD AUTO: 218 THOUSANDS/UL (ref 149–390)
PLATELET # BLD AUTO: 222 THOUSANDS/UL (ref 149–390)
PMV BLD AUTO: 10.3 FL (ref 8.9–12.7)
PMV BLD AUTO: 10.4 FL (ref 8.9–12.7)
POTASSIUM SERPL-SCNC: 4.1 MMOL/L (ref 3.5–5.3)
POTASSIUM SERPL-SCNC: 4.3 MMOL/L (ref 3.5–5.3)
PROT SERPL-MCNC: 7 G/DL (ref 6.4–8.2)
PROT SERPL-MCNC: 7.2 G/DL (ref 6.4–8.2)
PROT UR STRIP-MCNC: ABNORMAL MG/DL
RBC # BLD AUTO: 4.92 MILLION/UL (ref 3.88–5.62)
RBC # BLD AUTO: 5.06 MILLION/UL (ref 3.88–5.62)
RBC #/AREA URNS AUTO: NORMAL /HPF
SODIUM SERPL-SCNC: 139 MMOL/L (ref 136–145)
SODIUM SERPL-SCNC: 141 MMOL/L (ref 136–145)
SP GR UR STRIP.AUTO: 1.01 (ref 1–1.03)
UROBILINOGEN UR QL STRIP.AUTO: 1 E.U./DL
WBC # BLD AUTO: 10.52 THOUSAND/UL (ref 4.31–10.16)
WBC # BLD AUTO: 11.41 THOUSAND/UL (ref 4.31–10.16)
WBC #/AREA URNS AUTO: NORMAL /HPF

## 2018-04-19 PROCEDURE — 76705 ECHO EXAM OF ABDOMEN: CPT

## 2018-04-19 PROCEDURE — 80053 COMPREHEN METABOLIC PANEL: CPT | Performed by: EMERGENCY MEDICINE

## 2018-04-19 PROCEDURE — 81002 URINALYSIS NONAUTO W/O SCOPE: CPT | Performed by: EMERGENCY MEDICINE

## 2018-04-19 PROCEDURE — 74177 CT ABD & PELVIS W/CONTRAST: CPT

## 2018-04-19 PROCEDURE — 99285 EMERGENCY DEPT VISIT HI MDM: CPT

## 2018-04-19 PROCEDURE — 80053 COMPREHEN METABOLIC PANEL: CPT | Performed by: SURGERY

## 2018-04-19 PROCEDURE — 99234 HOSP IP/OBS SM DT SF/LOW 45: CPT | Performed by: SURGERY

## 2018-04-19 PROCEDURE — 96374 THER/PROPH/DIAG INJ IV PUSH: CPT

## 2018-04-19 PROCEDURE — 96361 HYDRATE IV INFUSION ADD-ON: CPT

## 2018-04-19 PROCEDURE — 83690 ASSAY OF LIPASE: CPT | Performed by: EMERGENCY MEDICINE

## 2018-04-19 PROCEDURE — 81001 URINALYSIS AUTO W/SCOPE: CPT

## 2018-04-19 PROCEDURE — 85025 COMPLETE CBC W/AUTO DIFF WBC: CPT | Performed by: EMERGENCY MEDICINE

## 2018-04-19 PROCEDURE — 85025 COMPLETE CBC W/AUTO DIFF WBC: CPT | Performed by: SURGERY

## 2018-04-19 PROCEDURE — 36415 COLL VENOUS BLD VENIPUNCTURE: CPT | Performed by: EMERGENCY MEDICINE

## 2018-04-19 PROCEDURE — 90686 IIV4 VACC NO PRSV 0.5 ML IM: CPT | Performed by: SURGERY

## 2018-04-19 RX ORDER — OXYCODONE HYDROCHLORIDE 5 MG/1
5 TABLET ORAL EVERY 4 HOURS PRN
Status: DISCONTINUED | OUTPATIENT
Start: 2018-04-19 | End: 2018-04-19 | Stop reason: HOSPADM

## 2018-04-19 RX ORDER — OXYCODONE HYDROCHLORIDE 10 MG/1
10 TABLET ORAL EVERY 4 HOURS PRN
Status: DISCONTINUED | OUTPATIENT
Start: 2018-04-19 | End: 2018-04-19 | Stop reason: HOSPADM

## 2018-04-19 RX ORDER — ACETAMINOPHEN 325 MG/1
650 TABLET ORAL EVERY 6 HOURS PRN
Status: DISCONTINUED | OUTPATIENT
Start: 2018-04-19 | End: 2018-04-19 | Stop reason: HOSPADM

## 2018-04-19 RX ORDER — ONDANSETRON 2 MG/ML
4 INJECTION INTRAMUSCULAR; INTRAVENOUS EVERY 6 HOURS PRN
Status: DISCONTINUED | OUTPATIENT
Start: 2018-04-19 | End: 2018-04-19 | Stop reason: HOSPADM

## 2018-04-19 RX ORDER — SODIUM CHLORIDE, SODIUM LACTATE, POTASSIUM CHLORIDE, CALCIUM CHLORIDE 600; 310; 30; 20 MG/100ML; MG/100ML; MG/100ML; MG/100ML
125 INJECTION, SOLUTION INTRAVENOUS CONTINUOUS
Status: DISCONTINUED | OUTPATIENT
Start: 2018-04-19 | End: 2018-04-19

## 2018-04-19 RX ADMIN — INFLUENZA VIRUS VACCINE 0.5 ML: 15; 15; 15; 15 SUSPENSION INTRAMUSCULAR at 13:04

## 2018-04-19 RX ADMIN — SODIUM CHLORIDE, SODIUM LACTATE, POTASSIUM CHLORIDE, AND CALCIUM CHLORIDE 125 ML/HR: .6; .31; .03; .02 INJECTION, SOLUTION INTRAVENOUS at 03:00

## 2018-04-19 RX ADMIN — HYDROMORPHONE HYDROCHLORIDE 1 MG: 1 INJECTION, SOLUTION INTRAMUSCULAR; INTRAVENOUS; SUBCUTANEOUS at 00:59

## 2018-04-19 RX ADMIN — IOHEXOL 100 ML: 350 INJECTION, SOLUTION INTRAVENOUS at 01:57

## 2018-04-19 RX ADMIN — SODIUM CHLORIDE, SODIUM LACTATE, POTASSIUM CHLORIDE, AND CALCIUM CHLORIDE 125 ML/HR: .6; .31; .03; .02 INJECTION, SOLUTION INTRAVENOUS at 06:55

## 2018-04-19 RX ADMIN — SODIUM CHLORIDE 1000 ML: 0.9 INJECTION, SOLUTION INTRAVENOUS at 00:58

## 2018-04-19 NOTE — H&P
H&P Exam - General Surgery   Bárbara Carrillo 41 y o  male MRN: [id number]  Unit/Bed#: ED 01 Encounter: [id number]    Assessment/Plan     Assessment:  38yM w/ epigastric pain 2/2 symptomatic cholelithiasis vs gastric ulcer     Plan:  Clears  IVF  AM CMP and CBC  Formal RUQ ultrasound    History of Present Illness     HPI:  Bárbara Carrillo is a 41 y o  male who presents with right upper quadrant/epigastric pain that started last evening  Medical history is only significant for obesity and diabetes  The pain started 30 mins after dinner as bilateral upper abdominal pain  This pain then moved into the epigastrum  He has never had pain like this before  There was some nausea when the pain first came on  Denies fever or chills  Pepto bismal at home reduced the severity of the pain  Received dilaudid here which helped but then pain drastically reduced after passing gas  CT was done showing cholelithiasis without evidence of cholecystitis  He is currently non-tender to palpation  WBC 11 4        Tbili 0,39  Afebrile    Review of Systems   Constitutional: Negative for chills and fever  Respiratory: Negative for chest tightness and shortness of breath  Cardiovascular: Negative for chest pain  Gastrointestinal: Positive for abdominal pain and nausea  Negative for vomiting  Genitourinary: Negative for flank pain  Skin: Negative for pallor and wound  Allergic/Immunologic: Negative for environmental allergies and food allergies  Neurological: Negative for dizziness and light-headedness         Historical Information   Past Medical History:   Diagnosis Date    Diabetes mellitus ([address] )     [facility] mellitus [facility] )     other type with circulatory complication, without long-term current    Hearing loss     r ear only    Obesity     Onychomycosis of multiple toenails with type 2 diabetes mellitus ([address] )     last assessed: 3/21/2017     Past Surgical History:   Procedure Laterality Date    MOUTH SURGERY  12/28/2017    TONSILLECTOMY AND ADENOIDECTOMY       Social History   History   Alcohol Use    Yes     Comment: 1x weekly; social alcohol use (as per allscripts)     History   Drug Use No     History   Smoking Status    Never Smoker   Smokeless Tobacco    Never Used     Family History: non-contributory    Meds/Allergies   PTA meds:   Prior to Admission Medications   Prescriptions Last Dose Informant Patient Reported? Taking? Blood Glucose Monitoring Suppl (ONE TOUCH ULTRA 2) w/Device KIT 4/18/2018 at Unknown time  Yes Yes   Sig: by Does not apply route daily   cetirizine (ZyrTEC) 10 mg tablet   No No   Sig: Take 1 tablet (10 mg total) by mouth daily for 7 days   ibuprofen (ADVIL) 200 mg tablet 4/18/2018 at Unknown time  Yes Yes   Sig: Take by mouth   metFORMIN (GLUCOPHAGE) 500 mg tablet 4/18/2018 at Unknown time  Yes Yes   Sig: Take 500 mg by mouth 2 (two) times a day with meals   methocarbamol (ROBAXIN) 750 mg tablet 4/18/2018 at Unknown time  Yes Yes   Sig: TAKE 1 TO 2 TABLETS 3 TIMES DAILY AS NEEDED     pregabalin (LYRICA) 25 mg capsule 4/18/2018 at Unknown time  No Yes   Sig: Take 1 capsule (25 mg total) by mouth 2 (two) times a day      Facility-Administered Medications: None     Allergies   Allergen Reactions    Menthol Rash       Objective   First Vitals:   Blood Pressure: (!) 171/110 (04/19/18 0017)  Pulse: 78 (04/19/18 0017)  Temperature: (!) 97 4 °F (36 3 °C) (04/19/18 0017)  Temp Source: Oral (04/19/18 0017)  Respirations: 18 (04/19/18 0017)  Weight - Scale: (!) 171 kg (376 lb) (04/19/18 0017)  SpO2: 98 % (04/19/18 0017)    Current Vitals:   Blood Pressure: (!) 171/110 (04/19/18 0017)  Pulse: 78 (04/19/18 0017)  Temperature: (!) 97 4 °F (36 3 °C) (04/19/18 0017)  Temp Source: Oral (04/19/18 0017)  Respirations: 18 (04/19/18 0017)  Weight - Scale: (!) 171 kg (376 lb) (04/19/18 0017)  SpO2: 98 % (04/19/18 0017)      Intake/Output Summary (Last 24 hours) at 04/19/18 0932  Last data filed at 04/19/18 0058   Gross per 24 hour   Intake             1000 ml   Output                0 ml   Net             1000 ml       Invasive Devices     Peripheral Intravenous Line            Peripheral IV 04/19/18 Right Forearm less than 1 day                Physical Exam   Constitutional: He is oriented to person, place, and time  He appears well-developed  No distress  HENT:   Head: Normocephalic and atraumatic  Cardiovascular: Normal rate and regular rhythm  Pulmonary/Chest: Effort normal  No respiratory distress  Abdominal: Soft  He exhibits no distension and no mass  There is no tenderness  There is no rebound and no guarding  Musculoskeletal: He exhibits no edema or deformity  Neurological: He is alert and oriented to person, place, and time  Skin: Skin is warm  He is not diaphoretic  No erythema  Psychiatric: He has a normal mood and affect  His behavior is normal  Judgment and thought content normal        Lab Results:   I have personally reviewed pertinent lab results  , CBC:   Lab Results   Component Value Date    WBC 11 41 (H) 04/19/2018    HGB 15 6 04/19/2018    HCT 47 2 04/19/2018    MCV 93 04/19/2018     04/19/2018    MCH 30 8 04/19/2018    MCHC 33 1 04/19/2018    RDW 13 3 04/19/2018    MPV 10 4 04/19/2018    NRBC 0 04/19/2018   , CMP:   Lab Results   Component Value Date     04/19/2018    K 4 1 04/19/2018     04/19/2018    CO2 34 (H) 04/19/2018    ANIONGAP 2 (L) 04/19/2018    BUN 13 04/19/2018    CREATININE 0 82 04/19/2018    GLUCOSE 110 04/19/2018    CALCIUM 8 6 04/19/2018     (H) 04/19/2018     (H) 04/19/2018    ALKPHOS 95 04/19/2018    PROT 7 2 04/19/2018    BILITOT 0 39 04/19/2018    EGFR 114 04/19/2018     Imaging: I have personally reviewed pertinent reports  EKG, Pathology, and Other Studies: I have personally reviewed pertinent reports        Code Status: Prior  Advance Directive and Living Will:      Power of :    POLST:      Counseling / Coordination of Care  Total floor / unit time spent today 25 minutes  Greater than 50% of total time was spent with the patient and / or family counseling and / or coordination of care  A description of the counseling / coordination of care: 25

## 2018-04-19 NOTE — NURSING NOTE
AVS reviewed with pt  No questions or concerns at this time  Pt left with family member via foot with all personal belongings  IV site removed; pt tolerated well; minimal bleeding noted, pressure applied and dressed

## 2018-04-19 NOTE — ED ATTENDING ATTESTATION
I, [address], DO, saw and evaluated the patient  I have discussed the patient with the resident/non-physician practitioner and agree with the resident's/non-physician practitioner's findings, Plan of Care, and MDM as documented in the resident's/non-physician practitioner's note, except where noted  All available labs and Radiology studies were reviewed  At this point I agree with the current assessment done in the Emergency Department  I have conducted an independent evaluation of this patient a history and physical is as follows:    29-year-old male presents with abdominal pain that started 9:00 p m  Julissa Johnson Pain was around the upper abdomen but now is more in the mid epigastric area and mid abdomen  Denies history of similar symptoms  Did have nausea, no vomiting  On exam-no acute distress, heart regular, lungs clear abdomen obese, soft, tender in the mid abdomen and mid epigastric area    Plan-check abdominal labs and CT abdomen    Critical Care Time  CritCare Time    Procedures

## 2018-04-19 NOTE — PROGRESS NOTES
Pt resting in bed  Vital signs stable  No C/O pain at this time  Report given to morning shift nurse

## 2018-04-19 NOTE — DISCHARGE INSTRUCTIONS
Gallstones   WHAT YOU NEED TO KNOW:   Gallstones, also called cholelithiasis, are hard substances that form in your gallbladder or bile duct  Your gallbladder and bile duct are located on the right side of your abdomen, near your liver  Your gallbladder stores bile, which helps break down the fat that you eat  Your gallbladder also helps remove certain chemicals from your body         DISCHARGE INSTRUCTIONS:   Medicines:   · Prescription pain medicine may be given  Ask your healthcare provider how to take this medicine safely      · Take your medicine as directed  Contact your healthcare provider if you think your medicine is not helping or if you have side effects  Tell him if you are allergic to any medicine  Keep a list of the medicines, vitamins, and herbs you take  Include the amounts, and when and why you take them  Bring the list or the pill bottles to follow-up visits  Carry your medicine list with you in case of an emergency  Follow up with your healthcare provider as directed: Write down your questions so you remember to ask them during your visits  Eat a variety of healthy foods: This may help you have more energy and heal faster  Healthy foods include fruit, vegetables, whole-grain breads, low-fat dairy products, beans, lean meat, and fish  Ask if you need to be on a special diet  Exercise as directed: Talk to your healthcare provider about the best exercise plan for you  Exercise can help you lose weight and improve your health  Contact your healthcare provider if:   · You have nausea and are vomiting      · Your urine is dark      · You have brisa-colored bowel movements      · You have questions or concerns about your condition or care  Seek care immediately or call 911 if:   · You have a fever and chills      · Your skin or eyes turn yellow      · You have severe pain in your upper abdomen, just below the right ribcage

## 2018-04-19 NOTE — ED PROVIDER NOTES
History  Chief Complaint   Patient presents with    Abdominal Pain     pt had pain around ribs starting at 2100  Now pain has settled in epigastric area  No c/o n/v/d  Pain states that he "gets the sweats when pain get real bad"     A 42-year-old male with past medical history of diabetes and chronic low back pain; presents with epigastric abdominal pain  Patient states around 9:00 pm last evening he developed an ache along the lower border of his ribcage  Symptoms lasted for approximately 40 minutes before resolving followed by onset of severe epigastric pain  Epigastric abdominal pain has been constant since onset however is colicky in nature  Pain is nonradiating  Pt does complain of incraesed bloating when pain is at its maximum  Patient did have one episode of nausea however no vomiting  Patient denies recent fever, chills, chest pain, shortness of breath, diarrhea, dysuria, urinary frequency/urgency, peripheral edema and rashes  Patient did take 2 doses of Pepto-Bismol without relief of his symptoms  Patient has never had similar symptoms  Patient denies prior abdominal surgeries  A/P:  Epigastric abdominal pain, reproducible tenderness on exam   Will obtain lab work and CT scan for further evaluation of pancreatitis, hepatitis and biliary etiology  Will give IV fluids and Dilaudid for pain control  History provided by:  Patient      Prior to Admission Medications   Prescriptions Last Dose Informant Patient Reported? Taking?    Blood Glucose Monitoring Suppl (ONE TOUCH ULTRA 2) w/Device KIT 4/18/2018 at Unknown time  Yes Yes   Sig: by Does not apply route daily   cetirizine (ZyrTEC) 10 mg tablet   No No   Sig: Take 1 tablet (10 mg total) by mouth daily for 7 days   ibuprofen (ADVIL) 200 mg tablet 4/18/2018 at Unknown time  Yes Yes   Sig: Take by mouth   metFORMIN (GLUCOPHAGE) 500 mg tablet 4/18/2018 at Unknown time  Yes Yes   Sig: Take 500 mg by mouth 2 (two) times a day with meals methocarbamol (ROBAXIN) 750 mg tablet 4/18/2018 at Unknown time  Yes Yes   Sig: TAKE 1 TO 2 TABLETS 3 TIMES DAILY AS NEEDED  pregabalin (LYRICA) 25 mg capsule 4/18/2018 at Unknown time  No Yes   Sig: Take 1 capsule (25 mg total) by mouth 2 (two) times a day      Facility-Administered Medications: None       Past Medical History:   Diagnosis Date    [facility] ([facility] )     Diabetes mellitus ([facility] )     other type with circulatory complication, without long-term current    Hearing loss     r ear only    Obesity     Onychomycosis of multiple toenails with type 2 diabetes mellitus ([facility] )     last assessed: 3/21/2017       Past Surgical History:   Procedure Laterality Date    MOUTH SURGERY  12/28/2017    TONSILLECTOMY AND ADENOIDECTOMY         Family History   Problem Relation Age of Onset    Diabetes Mother     Stomach cancer Other      I have reviewed and agree with the history as documented  Social History   Substance Use Topics    Smoking status: Never Smoker    Smokeless tobacco: Never Used    Alcohol use Yes      Comment: 1x weekly; social alcohol use (as per allscripts)        Review of Systems   Gastrointestinal: Positive for abdominal pain  All other systems reviewed and are negative        Physical Exam  ED Triage Vitals [04/19/18 0017]   Temperature Pulse Respirations Blood Pressure SpO2   (!) 97 4 °F (36 3 °C) 78 18 (!) 171/110 98 %      Temp Source Heart Rate Source Patient Position - Orthostatic VS BP Location FiO2 (%)   Oral Monitor Sitting Left arm --      Pain Score       6           Orthostatic Vital Signs  Vitals:    04/19/18 0017 04/19/18 0430 04/19/18 0638   BP: (!) 171/110 163/76 126/60   Pulse: 78 76 65   Patient Position - Orthostatic VS: Sitting  Lying       Physical Exam   General Appearance: alert and oriented, nad, non toxic appearing; morbidly obese  Skin:  Warm, dry, intact  HEENT: atraumatic, normocephalic  Neck: Supple, trachea midline  Cardiac: RRR; no murmurs, rub, gallops  Pulmonary: lungs CTAB; no wheezes, rales, rhonchi  Gastrointestinal: abdomen soft, epigastric and central abdominal tenderness, nondistended; no guarding or rebound tenderness; good bowel sounds, no mass or bruits  Extremities:  no pedal edema, 2+ pulses; no calf tenderness, no clubbing, no cyanosis  Neuro:  no focal motor or sensory deficits, CN 2-12 grossly intact  Psych:  Normal mood and affect, normal judgement and insight      ED Medications  Medications   lactated ringers infusion (125 mL/hr Intravenous New Bag 4/19/18 4111)   acetaminophen (TYLENOL) tablet 650 mg (not administered)   oxyCODONE (ROXICODONE) immediate release tablet 10 mg (not administered)   oxyCODONE (ROXICODONE) IR tablet 5 mg (not administered)   ondansetron (ZOFRAN) injection 4 mg (not administered)   influenza inactivated quadrivalent vaccine (FLULAVAL) IM injection 0 5 mL (not administered)   sodium chloride 0 9 % bolus 1,000 mL (0 mL Intravenous Stopped 4/19/18 0303)   HYDROmorphone (DILAUDID) injection 1 mg (1 mg Intravenous Given 4/19/18 0059)   iohexol (OMNIPAQUE) 350 MG/ML injection (MULTI-DOSE) 100 mL (100 mL Intravenous Given 4/19/18 0157)       Diagnostic Studies  Results Reviewed     Procedure Component Value Units Date/Time    Comprehensive metabolic panel [[id number]]  (Abnormal) Collected:  04/19/18 0426    Lab Status:  Final result Specimen:  Blood from Arm, Right Updated:  04/19/18 0456     Sodium 141 mmol/L      Potassium 4 3 mmol/L      Chloride 106 mmol/L      CO2 31 mmol/L      Anion Gap 4 mmol/L      BUN 12 mg/dL      Creatinine 0 66 mg/dL      Glucose 103 mg/dL      Calcium 8 2 (L) mg/dL       (H) U/L       (H) U/L      Alkaline Phosphatase 97 U/L      Total Protein 7 0 g/dL      Albumin 3 2 (L) g/dL      Total Bilirubin 0 32 mg/dL      eGFR 124 ml/min/1 73sq m     Narrative:         National Kidney Disease Education Program recommendations are as follows:  GFR calculation is accurate only with a steady state creatinine  Chronic Kidney disease less than 60 ml/min/1 73 sq  meters  Kidney failure less than 15 ml/min/1 73 sq  meters      CBC and differential [[id number]]  (Abnormal) Collected:  04/19/18 0426    Lab Status:  Final result Specimen:  Blood from Arm, Right Updated:  04/19/18 0448     WBC 10 52 (H) Thousand/uL      RBC 4 92 Million/uL      Hemoglobin 15 2 g/dL      Hematocrit 46 0 %      MCV 94 fL      MCH 30 9 pg      MCHC 33 0 g/dL      RDW 13 2 %      MPV 10 3 fL      Platelets 476 Thousands/uL      nRBC 0 /100 WBCs      Neutrophils Relative 69 %      Lymphocytes Relative 21 %      Monocytes Relative 7 %      Eosinophils Relative 3 %      Basophils Relative 0 %      Neutrophils Absolute 7 22 Thousands/µL      Lymphocytes Absolute 2 17 Thousands/µL      Monocytes Absolute 0 78 Thousand/µL      Eosinophils Absolute 0 31 Thousand/µL      Basophils Absolute 0 02 Thousands/µL     Urine Microscopic [[id number]]  (Normal) Collected:  04/19/18 0333    Lab Status:  Final result Specimen:  Urine from Urine, Clean Catch Updated:  04/19/18 0413     RBC, UA None Seen /hpf      WBC, UA None Seen /hpf      Epithelial Cells None Seen /hpf      Bacteria, UA None Seen /hpf      Hyaline Casts, UA None Seen /lpf     POCT urinalysis dipstick [[id number]]  (Normal) Resulted:  04/19/18 0332    Lab Status:  Final result Specimen:  Urine Updated:  04/19/18 0332     Color, UA see results    ED Urine Macroscopic [[id number]]  (Abnormal) Collected:  04/19/18 0333    Lab Status:  Final result Specimen:  Urine Updated:  04/19/18 0330     Color, UA Yellow     Clarity, UA Clear     pH, UA 8 5 (H)     Leukocytes, UA Negative     Nitrite, UA Negative     Protein, UA 30 (1+) (A) mg/dl      Glucose, UA Negative mg/dl      Ketones, UA Negative mg/dl      Urobilinogen, UA 1 0 E U /dl      Bilirubin, UA Negative     Blood, UA Negative     Specific Gravity, UA 1 015    Narrative:       CLINITEK RESULT    Comprehensive metabolic panel [[id number]] (Abnormal) Collected:  04/19/18 0059    Lab Status:  Final result Specimen:  Blood from Arm, Right Updated:  04/19/18 0131     Sodium 139 mmol/L      Potassium 4 1 mmol/L      Chloride 103 mmol/L      CO2 34 (H) mmol/L      Anion Gap 2 (L) mmol/L      BUN 13 mg/dL      Creatinine 0 82 mg/dL      Glucose 110 mg/dL      Calcium 8 6 mg/dL       (H) U/L       (H) U/L      Alkaline Phosphatase 95 U/L      Total Protein 7 2 g/dL      Albumin 3 2 (L) g/dL      Total Bilirubin 0 39 mg/dL      eGFR 114 ml/min/1 73sq m     Narrative:         National Kidney Disease Education Program recommendations are as follows:  GFR calculation is accurate only with a steady state creatinine  Chronic Kidney disease less than 60 ml/min/1 73 sq  meters  Kidney failure less than 15 ml/min/1 73 sq  meters  Lipase [[id number]]  (Normal) Collected:  04/19/18 0059    Lab Status:  Final result Specimen:  Blood from Arm, Right Updated:  04/19/18 0131     Lipase 98 u/L     CBC and differential [[id number]]  (Abnormal) Collected:  04/19/18 0059    Lab Status:  Final result Specimen:  Blood from Arm, Right Updated:  04/19/18 0122     WBC 11 41 (H) Thousand/uL      RBC 5 06 Million/uL      Hemoglobin 15 6 g/dL      Hematocrit 47 2 %      MCV 93 fL      MCH 30 8 pg      MCHC 33 1 g/dL      RDW 13 3 %      MPV 10 4 fL      Platelets 118 Thousands/uL      nRBC 0 /100 WBCs      Neutrophils Relative 73 %      Lymphocytes Relative 17 %      Monocytes Relative 7 %      Eosinophils Relative 3 %      Basophils Relative 0 %      Neutrophils Absolute 8 30 (H) Thousands/µL      Lymphocytes Absolute 1 96 Thousands/µL      Monocytes Absolute 0 76 Thousand/µL      Eosinophils Absolute 0 36 Thousand/µL      Basophils Absolute 0 01 Thousands/µL                  CT abdomen pelvis with contrast   Final Result by Delio Oh MD (04/19 0206)      Cholelithiasis without CT evidence for cholecystitis    Otherwise unremarkable CT of the abdomen and pelvis with IV without oral contrast             Workstation performed: [id number]         US gallbladder    (Results Pending)         Procedures  Procedures      Phone Consults  ED Phone Contact    ED Course  ED Course as of Apr 19 1207   Thu Apr 19, 2018   0134 Along with elevated ALT, possible biliary etiology  Pending CTAP AST: (!) 138   0213 Gallstones without signs of cholecystitis  Given elevation of LFT's, will have surgery evaluate for possible choledocholithiasis  CT abdomen pelvis with contrast                               [facility]  CritCare Time    Disposition  Final diagnoses:   Cholelithiasis     Time reflects when diagnosis was documented in both MDM as applicable and the Disposition within this note     Time User Action Codes Description Comment    4/19/2018  5:33 AM Nakul Jimenez Add [K80 20] Cholelithiasis       ED Disposition     ED Disposition Condition Comment    Admit  Case was discussed with Stevan Lai and the patient's admission status was agreed to be Admission Status: observation status to the service of Dr Stevan Lai   Follow-up Information    None       Current Discharge Medication List      CONTINUE these medications which have NOT CHANGED    Details   Blood Glucose Monitoring Suppl (ONE TOUCH ULTRA 2) w/Device KIT by Does not apply route daily      ibuprofen (ADVIL) 200 mg tablet Take by mouth      metFORMIN (GLUCOPHAGE) 500 mg tablet Take 500 mg by mouth 2 (two) times a day with meals      methocarbamol (ROBAXIN) 750 mg tablet TAKE 1 TO 2 TABLETS 3 TIMES DAILY AS NEEDED  Refills: 2      pregabalin (LYRICA) 25 mg capsule Take 1 capsule (25 mg total) by mouth 2 (two) times a day  Qty: 60 capsule, Refills: 0    Associated Diagnoses: Lumbar herniated disc      cetirizine (ZyrTEC) 10 mg tablet Take 1 tablet (10 mg total) by mouth daily for 7 days  Qty: 7 tablet, Refills: 0    Associated Diagnoses: Nasal congestion           No discharge procedures on file      ED Provider  Attending physically available and evaluated Mira Arreola I managed the patient along with the ED Attending      Electronically Signed by         Reji Estrada DO  04/19/18 1818

## 2018-04-19 NOTE — DISCHARGE SUMMARY
Discharge Summary - General Surgery  Dunia Dash 41 y o  male MRN: [id number]  Unit/Bed#: -01 Encounter: [id number]    Admission Date:   Admission Orders     Ordered        04/19/18 0256  Place in Observation  Once               Admitting Diagnosis: Cholelithiasis [K80 20]  Chest pain [R07 9]    HPI: Dunia Dash is a 41 y o  male who presents with right upper quadrant/epigastric pain that started last evening  Medical history is only significant for obesity and diabetes  The pain started 30 mins after dinner as bilateral upper abdominal pain  This pain then moved into the epigastrum  He has never had pain like this before  There was some nausea when the pain first came on  Denies fever or chills  Pepto bismal at home reduced the severity of the pain  Received dilaudid here which helped but then pain drastically reduced after passing gas  CT was done showing cholelithiasis without evidence of cholecystitis  Procedures Performed: No orders of the defined types were placed in this encounter  Hospital Course: A 41 y o  Presents with right upper quadrant pain likely secondary to symptomatic cholelithiasis based on CT scan findings  Patient was admitted for observation and an ultrasound was obtained because of mild elevations of his LFTs  The CT scan demonstrated cholelithiasis without CT evidence for cholecysititis  The Ultrasound demonstrated cholelithiasis with no gallbladder wall thickening and a questionable trace focus of pericholecystic fluid  Patient's pain resolved shortly after admission and patient was started on a diet  His repeat LFTs trended downwards  On the day of discharge, patient denied any abdominal pain was tolerated a diet and stable for discharge home  Patient understands he is to return to clinic for follow up for laparoscopic cholecystectomy   Patient also understands that he may redevelop symptoms of biliary colic and could develop an acute inflammation of the gallbladder upon discharge  Patient is to return to clinic after obtaining another set of LFTs to make sure LFTs normalize  Significant Findings, Care, Treatment and Services Provided: CT scan, US abdomen, po trial    Lab Results:      Lab Results   Component Value Date    WBC 10 52 (H) 04/19/2018    HGB 15 2 04/19/2018    HCT 46 0 04/19/2018    MCV 94 04/19/2018     04/19/2018       Lab Results   Component Value Date    GLUCOSE 103 04/19/2018    CALCIUM 8 2 (L) 04/19/2018     04/19/2018    K 4 3 04/19/2018    CO2 31 04/19/2018     04/19/2018    BUN 12 04/19/2018    CREATININE 0 66 04/19/2018       Lab Results   Component Value Date     (H) 04/19/2018     (H) 04/19/2018    ALKPHOS 97 04/19/2018    BILITOT 0 32 00/42/5093       Complications: None    Discharge Diagnosis: Symptomatic cholelithiasis    Resolved Problems  Date Reviewed: 4/19/2018    None          Condition at Discharge: good     Discharge instructions/Information to patient and family:   See after visit summary for information provided to patient and family  Provisions for Follow-Up Care:  See after visit summary for information related to follow-up care and any pertinent home health orders  Disposition: Home    Planned Readmission: No    Discharge Statement   I spent 30 minutes discharging the patient  This time was spent on the day of discharge  I had direct contact with the patient on the day of discharge  Additional documentation is required if more than 30 minutes were spent on discharge  Discharge Medications:  See after visit summary for reconciled discharge medications provided to patient and family

## 2018-04-19 NOTE — PLAN OF CARE
Problem: PAIN - ADULT  Goal: Verbalizes/displays adequate comfort level or baseline comfort level  Interventions:  - Encourage patient to monitor pain and request assistance  - Assess pain using appropriate pain scale  - Administer analgesics based on type and severity of pain and evaluate response  - Implement non-pharmacological measures as appropriate and evaluate response  - Consider cultural and social influences on pain and pain management  - Notify physician/advanced practitioner if interventions unsuccessful or patient reports new pain  Outcome: Progressing      Problem: INFECTION - ADULT  Goal: Absence or prevention of progression during hospitalization  INTERVENTIONS:  - Assess and monitor for signs and symptoms of infection  - Monitor lab/diagnostic results  - Monitor all insertion sites, i e  indwelling lines, tubes, and drains  - Monitor endotracheal (as able) and nasal secretions for changes in amount and color  - [place]ic[place] appropriate cooling/warming therapies per order  - Administer medications as ordered  - Instruct and encourage patient and family to use good hand hygiene technique  - Identify and instruct in appropriate isolation precautions for identified infection/condition  Outcome: Progressing    Goal: Absence of fever/infection during neutropenic period  INTERVENTIONS:  - Monitor WBC  - Implement neutropenic guidelines  Outcome: Progressing      Problem: SAFETY ADULT  Goal: Patient will remain free of falls  INTERVENTIONS:  - Assess patient frequently for physical needs  -  Identify cognitive and physical deficits and behaviors that affect risk of falls    -  Paicines fall precautions as indicated by assessment   - Educate patient/family on patient safety including physical limitations  - Instruct patient to call for assistance with activity based on assessment  - Modify environment to reduce risk of injury  - Consider OT/PT consult to assist with strengthening/mobility  Outcome: Progressing    Goal: Maintain or return to baseline ADL function  INTERVENTIONS:  -  Assess patient's ability to carry out ADLs; assess patient's baseline for ADL function and identify physical deficits which impact ability to perform ADLs (bathing, care of mouth/teeth, toileting, grooming, dressing, etc )  - Assess/evaluate cause of self-care deficits   - Assess range of motion  - Assess patient's mobility; develop plan if impaired  - Assess patient's need for assistive devices and provide as appropriate  - Encourage maximum independence but intervene and supervise when necessary  ¯ Involve family in performance of ADLs  ¯ Assess for home care needs following discharge   ¯ Request OT consult to assist with ADL evaluation and planning for discharge  ¯ Provide patient education as appropriate  Outcome: Progressing    Goal: Maintain or return mobility status to optimal level  INTERVENTIONS:  - Assess patient's baseline mobility status (ambulation, transfers, stairs, etc )    - Identify cognitive and physical deficits and behaviors that affect mobility  - Identify mobility aids required to assist with transfers and/or ambulation (gait belt, sit-to-stand, lift, walker, cane, etc )  - [place] fall precautions as indicated by assessment  - Record patient progress and toleration of activity level on Mobility SBAR; progress patient to next Phase/Stage  - Instruct patient to call for assistance with activity based on assessment  - Request Rehabilitation consult to assist with strengthening/weightbearing, etc   Outcome: Progressing      Problem: DISCHARGE PLANNING  Goal: Discharge to home or other facility with appropriate resources  INTERVENTIONS:  - Identify barriers to discharge w/patient and caregiver  - Arrange for needed discharge resources and transportation as appropriate  - Identify discharge learning needs (meds, wound care, etc )  - Arrange for interpretive services to assist at discharge as needed  - Refer to Case Management Department for coordinating discharge planning if the patient needs post-hospital services based on physician/advanced practitioner order or complex needs related to functional status, cognitive ability, or social support system  Outcome: Progressing      Problem: Knowledge Deficit  Goal: Patient/family/caregiver demonstrates understanding of disease process, treatment plan, medications, and discharge instructions  Complete learning assessment and assess knowledge base    Interventions:  - Provide teaching at level of understanding  - Provide teaching via preferred learning methods  Outcome: Progressing

## 2018-04-23 DIAGNOSIS — E11.8 TYPE 2 DIABETES MELLITUS WITH COMPLICATION, UNSPECIFIED WHETHER LONG TERM INSULIN USE: Primary | ICD-10-CM

## 2018-05-02 NOTE — PROGRESS NOTES
Assessment/Plan:      Diagnoses and all orders for this visit:    Weakness of left lower extremity    Lumbar radiculitis          Subjective:     Patient ID: Aileen Anthony is a 41 y o  male  HPI  Follow-up visit last seen March 29th  He is continuing in physical therapy for strengthening of the left lower extremity for functional tasks  Unfortunately he was admitted through the ED for acute cholecystitis in mid April  Has Hem Onc Ov pending  Reports "numbing" has now reduced in left leg and stopped pregabalin  He is now tolerating two games consecutive, with some left HS soreness  Review of Systems   Constitutional: Negative for unexpected weight change ( intentional weight loss of 60 pounds  )  Gastrointestinal: Positive for constipation  Genitourinary: Negative  Neurological: Positive for numbness  Objective:  PT  Notes:     Bri Kelly, PT    Aileen Anthony has been compliant with attending PT and home exercise program since initial eval   Michael Flores  has made improvements in objective data since initial evalulation and has achieved all goals  Patient reports having returned to their prior level or function  Patient provided with updated Home Exercise Program, all questions answered, verbalized understanding and agreement to plan of care  Thus it was mutually decided to discontinue this episode of care and transition to Home Exercise Program               Physical Exam   Constitutional: He is oriented to person, place, and time  He appears well-developed and well-nourished  No distress  Eyes: Pupils are equal, round, and reactive to light  Neurological: He is alert and oriented to person, place, and time  No cranial nerve deficit  Reflex Scores:       Tricep reflexes are 2+ on the right side and 2+ on the left side  Bicep reflexes are 2+ on the right side  Brachioradialis reflexes are 2+ on the right side and 2+ on the left side         Patellar reflexes are 1+ on the right side and 1+ on the left side  Achilles reflexes are 1+ on the right side and 0 on the left side  Able to toe and heel stand

## 2018-05-03 ENCOUNTER — OFFICE VISIT (OUTPATIENT)
Dept: PAIN MEDICINE | Facility: CLINIC | Age: 38
End: 2018-05-03
Payer: COMMERCIAL

## 2018-05-03 VITALS
BODY MASS INDEX: 41.75 KG/M2 | HEIGHT: 73 IN | DIASTOLIC BLOOD PRESSURE: 90 MMHG | SYSTOLIC BLOOD PRESSURE: 140 MMHG | HEART RATE: 68 BPM | WEIGHT: 315 LBS

## 2018-05-03 DIAGNOSIS — R29.898 WEAKNESS OF LEFT LOWER EXTREMITY: Primary | ICD-10-CM

## 2018-05-03 DIAGNOSIS — M54.16 LUMBAR RADICULITIS: ICD-10-CM

## 2018-05-03 PROCEDURE — 99213 OFFICE O/P EST LOW 20 MIN: CPT | Performed by: PHYSICAL MEDICINE & REHABILITATION

## 2018-05-03 PROCEDURE — 1111F DSCHRG MED/CURRENT MED MERGE: CPT | Performed by: PHYSICAL MEDICINE & REHABILITATION

## 2018-05-10 ENCOUNTER — OFFICE VISIT (OUTPATIENT)
Dept: HEMATOLOGY ONCOLOGY | Facility: CLINIC | Age: 38
End: 2018-05-10
Payer: COMMERCIAL

## 2018-05-10 VITALS
HEART RATE: 87 BPM | SYSTOLIC BLOOD PRESSURE: 128 MMHG | RESPIRATION RATE: 16 BRPM | DIASTOLIC BLOOD PRESSURE: 82 MMHG | HEIGHT: 72 IN | WEIGHT: 315 LBS | OXYGEN SATURATION: 94 % | TEMPERATURE: 97.4 F | BODY MASS INDEX: 42.66 KG/M2

## 2018-05-10 DIAGNOSIS — D47.1 MYELOPROLIFERATIVE DISORDER (HCC): Primary | ICD-10-CM

## 2018-05-10 DIAGNOSIS — D47.2 GAMMOPATHY WITH MULTIPLE M SPIKES: ICD-10-CM

## 2018-05-10 DIAGNOSIS — D75.9 BONE MARROW DISORDER: ICD-10-CM

## 2018-05-10 PROCEDURE — 99205 OFFICE O/P NEW HI 60 MIN: CPT | Performed by: INTERNAL MEDICINE

## 2018-05-10 NOTE — PROGRESS NOTES
Oncology Outpatient Consult Note  Augie Smith 41 y o  male MRN: @ Encounter: [id number]        Date:  5/10/2018        CC:  Marrow signal abnormality      HPI:  Augie Smith is seen for initial consultation 5/10/2018 regarding Findings on MRI from December 2017  The patient had an MRI done showed marked abnormal diffusely hypointense marrow compatible with red marrow conversion versus infiltrative process  He was referred to see us  Myelofibrosis or myeloproliferative Was in the differential  Looking through his blood work over the last year he has had a hemoglobin that is fluctuated between Slightly high to normal  White count has been high on most occasions  Did have a small M spike on his blood work but on immunofixation was not done so this was not quantified  Myeloproliferative disorder is a possibility With these abnormal blood counts  As far as symptoms are concerned the patient states he is at baseline  He was in the emergency room for what sounds to be Cholecystitis a few weeks ago  He has seen a surgeon for this  Regardless he has no signs or symptoms of myeloma  Denies any drenching night sweats or B symptoms  Denies any unexplained weight loss  The rest of his 14 point review of systems today was negative  Test Results:    Imaging: Us Gallbladder    Result Date: 4/19/2018  Narrative: RIGHT UPPER QUADRANT ULTRASOUND INDICATION:     Cholecystitis / cholangitis, known, follow up  COMPARISON:  None TECHNIQUE:   Real-time ultrasound of the right upper quadrant was performed with a curvilinear transducer with both volumetric sweeps and still imaging techniques  FINDINGS: PANCREAS:  Visualized portions of the pancreas are within normal limits  AORTA AND IVC:  Visualized portions are normal for patient age  LIVER: Size:  Mildly enlarged  The liver measures 19 1 cm in the midclavicular line  Contour:  Surface contour is smooth  Parenchyma:  Echogenicity and echotexture are within normal limits  No evidence of suspicious mass  Limited imaging of the main portal vein shows it to be patent and hepatopetal   BILIARY: The gallbladder is normal in caliber  No wall thickening  There is trace pericholecystic fluid  There are a few shadowing gallstones  No sonographic Vaughn's sign  No intrahepatic biliary dilatation  CBD measures 6 mm  No choledocholithiasis  KIDNEY: Right kidney measures 16 2 x 5 4 cm  Within normal limits  ASCITES:   None  Impression: Cholelithiasis with a questionable trace focus of pericholecystic fluid  However, there is no gallbladder wall thickening  No sonographic Vaughn's sign was elicited during the exam   Correlation with laboratory studies is recommended  If symptoms persist, a hepatobiliary scan could be performed for further evaluation  Workstation performed: [id number]     Ct Abdomen Pelvis With Contrast    Result Date: 4/19/2018  Narrative: CT ABDOMEN AND PELVIS WITH IV CONTRAST INDICATION:   epigastric pain  COMPARISON: None  TECHNIQUE:  CT examination of the abdomen and pelvis was performed  Axial, sagittal, and coronal 2D reformatted images were created from the source data and submitted for interpretation  Radiation dose length product (DLP) for this visit:  3143 mGy-cm   This examination, like all CT scans performed in the [facility], was performed utilizing techniques to minimize radiation dose exposure, including the use of iterative reconstruction and automated exposure control  IV Contrast:  100 mL of iohexol (OMNIPAQUE) Enteric Contrast:  Enteric contrast was not administered  FINDINGS: ABDOMEN LOWER CHEST:  No clinically significant abnormality identified in the visualized lower chest  LIVER/BILIARY TREE:  Unremarkable  GALLBLADDER:  There are gallstone(s) and sludge within the gallbladder, without pericholecystic inflammatory changes  SPLEEN:  Unremarkable  PANCREAS:  Unremarkable  ADRENAL GLANDS:  Unremarkable   KIDNEYS/URETERS:  One or more simple renal cyst(s) is noted  Otherwise unremarkable kidneys  No hydronephrosis  STOMACH AND BOWEL:  Unremarkable  APPENDIX:  No findings to suggest appendicitis  ABDOMINOPELVIC CAVITY:  No ascites or free intraperitoneal air  No lymphadenopathy  VESSELS:  Unremarkable for patient's age  PELVIS REPRODUCTIVE ORGANS:  Unremarkable for patient's age  URINARY BLADDER:  Unremarkable  ABDOMINAL WALL/INGUINAL REGIONS:  Unremarkable  OSSEOUS STRUCTURES:  No acute fracture or destructive osseous lesion  Impression: Cholelithiasis without CT evidence for cholecystitis  Otherwise unremarkable CT of the abdomen and pelvis with IV without oral contrast  Workstation performed: [id number]       Labs:   Lab Results   Component Value Date    WBC 10 52 (H) 04/19/2018    HGB 15 2 04/19/2018    HCT 46 0 04/19/2018    MCV 94 04/19/2018     04/19/2018     Lab Results   Component Value Date     04/19/2018    K 4 3 04/19/2018     04/19/2018    CO2 31 04/19/2018    ANIONGAP 4 04/19/2018    BUN 12 04/19/2018    CREATININE 0 66 04/19/2018    GLUCOSE 103 04/19/2018    CALCIUM 8 2 (L) 04/19/2018     (H) 04/19/2018     (H) 04/19/2018    ALKPHOS 97 04/19/2018    PROT 7 0 04/19/2018    BILITOT 0 32 04/19/2018    EGFR 124 04/19/2018         ROS: As stated in history of present illness otherwise her 14 point review of systems today was negative          Active Problems:   Patient Active Problem List   Diagnosis    Type 2 diabetes mellitus [facility] [address] [facility]    Morbid obesity ([facility]    Acute right-sided low back pain with sciatica and paresthesia    Bone marrow disorder    Central stenosis of spinal canal    Elevated blood pressure reading    Elevated hemoglobin (HCC)    Foraminal stenosis of lumbosacral region    Intertrigo    Lumbar radiculitis    Lumbar spinal stenosis    Positive depression screening    Weakness of left lower extremity    Calculus of gallbladder without cholecystitis without obstruction Past Medical History:   Past Medical History:   Diagnosis Date    Diabetes mellitus ([facility] [address] [facility]     Diabetes mellitus ([facility] )     other type with circulatory complication, without long-term current    Hearing loss     r ear only    Obesity     Onychomycosis of multiple toenails with type 2 diabetes mellitus ([facility] [address] )     last assessed: 3/21/2017       Surgical History:   Past Surgical History:   Procedure Laterality Date    MOUTH SURGERY  12/28/2017    TONSILLECTOMY AND ADENOIDECTOMY         Family History:    Family History   Problem Relation Age of Onset    Diabetes Mother     Stomach cancer Other        Cancer-related family history includes Stomach cancer in his other  Social History:   Social History     Social History    Marital status: Single     Spouse name: N/A    Number of children: N/A    Years of education: N/A     Occupational History    employed      Social History Main Topics    Smoking status: Never Smoker    Smokeless tobacco: Never Used    Alcohol use Yes      Comment: 1x weekly; social alcohol use (as per allscripts)    Drug use: No    Sexual activity: Not on file     Other Topics Concern    Not on file     Social History Narrative    Lives with parents           Current Medications:   Current Outpatient Prescriptions   Medication Sig Dispense Refill    Blood Glucose Monitoring Suppl (ONE TOUCH ULTRA 2) w/Device KIT by Does not apply route daily      ibuprofen (ADVIL) 200 mg tablet Take by mouth      metFORMIN (GLUCOPHAGE) 500 mg tablet Take 1 tablet (500 mg total) by mouth 2 (two) times a day with meals 60 tablet 2    methocarbamol (ROBAXIN) 750 mg tablet TAKE 1 TO 2 TABLETS 3 TIMES DAILY AS NEEDED   2    cetirizine (ZyrTEC) 10 mg tablet Take 1 tablet (10 mg total) by mouth daily for 7 days 7 tablet 0     No current facility-administered medications for this visit  Allergies:    Allergies   Allergen Reactions    Menthol Rash         Physical Exam:    Body surface area is 2 77 meters squared  Wt Readings from Last 3 Encounters:   05/10/18 (!) 168 kg (370 lb)   05/03/18 (!) 167 kg (369 lb)   04/19/18 (!) 171 kg (378 lb 1 4 oz)        Temp Readings from Last 3 Encounters:   05/10/18 (!) 97 4 °F (36 3 °C) (Oral)   04/19/18 98 °F (36 7 °C) (Oral)   04/12/18 98 2 °F (36 8 °C) (Oral)        BP Readings from Last 3 Encounters:   05/10/18 128/82   05/03/18 140/90   04/19/18 126/60         Pulse Readings from Last 3 Encounters:   05/10/18 87   05/03/18 68   04/19/18 65       Physical Exam     Constitutional   General appearance: No acute distress, well appearing and well nourished  Eyes   Conjunctiva and lids: No swelling, erythema or discharge  Pupils and irises: Equal, round and reactive to light  Ears, Nose, Mouth, and Throat   External inspection of ears and nose: Normal     Nasal mucosa, septum, and turbinates: Normal without edema or erythema  Oropharynx: Normal with no erythema, edema, exudate or lesions  Pulmonary   Respiratory effort: No increased work of breathing or signs of respiratory distress  Auscultation of lungs: Clear to auscultation  Cardiovascular   Palpation of heart: Normal PMI, no thrills  Auscultation of heart: Normal rate and rhythm, normal S1 and S2, without murmurs  Examination of extremities for edema and/or varicosities: Normal     Carotid pulses: Normal     Abdomen   Abdomen: Non-tender, no masses  Liver and spleen: No hepatomegaly or splenomegaly  Lymphatic   Palpation of lymph nodes in neck: No lymphadenopathy  Musculoskeletal   Gait and station: Normal     Digits and nails: Normal without clubbing or cyanosis  Inspection/palpation of joints, bones, and muscles: Normal     Skin   Skin and subcutaneous tissue: Normal without rashes or lesions  Neurologic   Cranial nerves: Cranial nerves 2-12 intact  Sensation: No sensory loss      Psychiatric   Orientation to person, place, and time: Normal     Mood and affect: Normal           Assessment/ Plan: This is a pleasant 26-year-old male who was referred to see us for some abnormal MRI findings along with the M spike on his blood work  Urine electrophoresis showed no M spike  He is not anemic, has a normal calcium and kidney function and has no other signs or symptoms of myeloma  However his hemoglobin has been running high over the last year at least  His white count is also been running high  Myeloproliferative disorder is definitely a possibility  At this point I will do a complete myeloma workup to include an SPEP with immunofixation, free light chains, immunoglobulin levels along with a repeat CBC differential and CMP  I will also do a myeloproliferative workup because of his elevated blood counts  I went over all of this in great length with the patient  I explained the rationale behind doing the testing  The patient agreed with the testing  I did offer to consider doing a bone marrow biopsy with the patient states she would rather have blood work done first and if there are any abnormalities or indications that a bone marrow needs to be done only than what he consented that  I think this is reasonable  I advised him if he needs to have this cholecystectomy secondary to his symptoms  His reasonable to consider  This blood work should not affect his ability to have his gallbladder removed  I'll see him back in a few weeks with the results of the above testing  Until then if he has any questions he will call our office  Goals and Barriers:  Current Goal:  Workup of abnormal blood work and MRI  Barriers: None  Patient's Capacity to Self Care:  Patient  able to self care      Portions of the record may have been created with voice recognition software   Occasional wrong word or "sound a like" substitutions may have occurred due to the inherent limitations of voice recognition software   Read the chart carefully and recognize, using context, where substitutions have occurred

## 2018-05-10 NOTE — LETTER
May 10, 2018     Kilo Drummond MD  [address] [place] [address]    Patient: Betsy Billingsley   YOB: 1980   Date of Visit: 5/10/2018       Dear Dr Yvonne Acevedo:    Thank you for referring Derrick Enciso to me for evaluation  Below are my notes for this consultation  If you have questions, please do not hesitate to call me  I look forward to following your patient along with you  Sincerely,        Jammie Clark MD        CC: No Recipients  Jammie Clark MD  5/10/2018  4:11 PM  Sign at close encounter     Oncology Outpatient Consult Note  Betsy Billingsley 41 y o  male MRN: @ Encounter: [id number]        Date:  5/10/2018        CC:  Marrow signal abnormality      HPI:  Betsy Billingsley is seen for initial consultation 5/10/2018 regarding Findings on MRI from December 2017  The patient had an MRI done showed marked abnormal diffusely hypointense marrow compatible with red marrow conversion versus infiltrative process  He was referred to see us  Myelofibrosis or myeloproliferative Was in the differential  Looking through his blood work over the last year he has had a hemoglobin that is fluctuated between Slightly high to normal  White count has been high on most occasions  Did have a small M spike on his blood work but on immunofixation was not done so this was not quantified  Myeloproliferative disorder is a possibility With these abnormal blood counts  As far as symptoms are concerned the patient states he is at baseline  He was in the emergency room for what sounds to be Cholecystitis a few weeks ago  He has seen a surgeon for this  Regardless he has no signs or symptoms of myeloma  Denies any drenching night sweats or B symptoms  Denies any unexplained weight loss  The rest of his 14 point review of systems today was negative        Test Results:    Imaging: Us Gallbladder    Result Date: 4/19/2018  Narrative: RIGHT UPPER QUADRANT ULTRASOUND INDICATION:     Cholecystitis / cholangitis, known, follow up  COMPARISON:  None TECHNIQUE:   Real-time ultrasound of the right upper quadrant was performed with a curvilinear transducer with both volumetric sweeps and still imaging techniques  FINDINGS: PANCREAS:  Visualized portions of the pancreas are within normal limits  AORTA AND IVC:  Visualized portions are normal for patient age  LIVER: Size:  Mildly enlarged  The liver measures 19 1 cm in the midclavicular line  Contour:  Surface contour is smooth  Parenchyma:  Echogenicity and echotexture are within normal limits  No evidence of suspicious mass  Limited imaging of the main portal vein shows it to be patent and hepatopetal   BILIARY: The gallbladder is normal in caliber  No wall thickening  There is trace pericholecystic fluid  There are a few shadowing gallstones  No sonographic Vaughn's sign  No intrahepatic biliary dilatation  CBD measures 6 mm  No choledocholithiasis  KIDNEY: Right kidney measures 16 2 x 5 4 cm  Within normal limits  ASCITES:   None  Impression: Cholelithiasis with a questionable trace focus of pericholecystic fluid  However, there is no gallbladder wall thickening  No sonographic Vaughn's sign was elicited during the exam   Correlation with laboratory studies is recommended  If symptoms persist, a hepatobiliary scan could be performed for further evaluation  Workstation performed: [id number]     Ct Abdomen Pelvis With Contrast    Result Date: 4/19/2018  Narrative: CT ABDOMEN AND PELVIS WITH IV CONTRAST INDICATION:   epigastric pain  COMPARISON: None  TECHNIQUE:  CT examination of the abdomen and pelvis was performed  Axial, sagittal, and coronal 2D reformatted images were created from the source data and submitted for interpretation  Radiation dose length product (DLP) for this visit:  3143 mGy-cm     This examination, like all CT scans performed in the [facility], was performed utilizing techniques to minimize radiation dose exposure, including the use of iterative reconstruction and automated exposure control  IV Contrast:  100 mL of iohexol (OMNIPAQUE) Enteric Contrast:  Enteric contrast was not administered  FINDINGS: ABDOMEN LOWER CHEST:  No clinically significant abnormality identified in the visualized lower chest  LIVER/BILIARY TREE:  Unremarkable  GALLBLADDER:  There are gallstone(s) and sludge within the gallbladder, without pericholecystic inflammatory changes  SPLEEN:  Unremarkable  PANCREAS:  Unremarkable  ADRENAL GLANDS:  Unremarkable  KIDNEYS/URETERS:  One or more simple renal cyst(s) is noted  Otherwise unremarkable kidneys  No hydronephrosis  STOMACH AND BOWEL:  Unremarkable  APPENDIX:  No findings to suggest appendicitis  ABDOMINOPELVIC CAVITY:  No ascites or free intraperitoneal air  No lymphadenopathy  VESSELS:  Unremarkable for patient's age  PELVIS REPRODUCTIVE ORGANS:  Unremarkable for patient's age  URINARY BLADDER:  Unremarkable  ABDOMINAL WALL/INGUINAL REGIONS:  Unremarkable  OSSEOUS STRUCTURES:  No acute fracture or destructive osseous lesion  Impression: Cholelithiasis without CT evidence for cholecystitis  Otherwise unremarkable CT of the abdomen and pelvis with IV without oral contrast  Workstation performed: [id number]       Labs:   Lab Results   Component Value Date    WBC 10 52 (H) 04/19/2018    HGB 15 2 04/19/2018    HCT 46 0 04/19/2018    MCV 94 04/19/2018     04/19/2018     Lab Results   Component Value Date     04/19/2018    K 4 3 04/19/2018     04/19/2018    CO2 31 04/19/2018    ANIONGAP 4 04/19/2018    BUN 12 04/19/2018    CREATININE 0 66 04/19/2018    GLUCOSE 103 04/19/2018    CALCIUM 8 2 (L) 04/19/2018     (H) 04/19/2018     (H) 04/19/2018    ALKPHOS 97 04/19/2018    PROT 7 0 04/19/2018    BILITOT 0 32 04/19/2018    EGFR 124 04/19/2018         ROS: As stated in history of present illness otherwise her 14 point review of systems today was negative          Active Problems:   Patient Active Problem List   Diagnosis    Type 2 diabetes mellitus ([facility] )    Morbid obesity ([facility]    Acute right-sided low back pain with sciatica and paresthesia    Bone marrow disorder    Central stenosis of spinal canal    Elevated blood pressure reading    Elevated hemoglobin (HCC)    Foraminal stenosis of lumbosacral region    Intertrigo    Lumbar radiculitis    Lumbar spinal stenosis    Positive depression screening    Weakness of left lower extremity    Calculus of gallbladder without cholecystitis without obstruction       Past Medical History:   Past Medical History:   Diagnosis Date    Diabetes mellitus ([facility] )     Diabetes mellitus ([facility] )     other type with circulatory complication, without long-term current    Hearing loss     r ear only    Obesity     Onychomycosis of multiple toenails with type 2 diabetes mellitus ([facility] )     last assessed: 3/21/2017       Surgical History:   Past Surgical History:   Procedure Laterality Date    MOUTH SURGERY  12/28/2017    TONSILLECTOMY AND ADENOIDECTOMY         Family History:    Family History   Problem Relation Age of Onset    Diabetes Mother     Stomach cancer Other        Cancer-related family history includes Stomach cancer in his other      Social History:   Social History     Social History    Marital status: Single     Spouse name: N/A    Number of children: N/A    Years of education: N/A     Occupational History    employed      Social History Main Topics    Smoking status: Never Smoker    Smokeless tobacco: Never Used    Alcohol use Yes      Comment: 1x weekly; social alcohol use (as per allscripts)    Drug use: No    Sexual activity: Not on file     Other Topics Concern    Not on file     Social History Narrative    Lives with parents           Current Medications:   Current Outpatient Prescriptions   Medication Sig Dispense Refill    Blood Glucose Monitoring Suppl (ONE TOUCH ULTRA 2) w/Device KIT by Does not apply route daily      ibuprofen (ADVIL) 200 mg tablet Take by mouth      metFORMIN (GLUCOPHAGE) 500 mg tablet Take 1 tablet (500 mg total) by mouth 2 (two) times a day with meals 60 tablet 2    methocarbamol (ROBAXIN) 750 mg tablet TAKE 1 TO 2 TABLETS 3 TIMES DAILY AS NEEDED   2    cetirizine (ZyrTEC) 10 mg tablet Take 1 tablet (10 mg total) by mouth daily for 7 days 7 tablet 0     No current facility-administered medications for this visit  Allergies: Allergies   Allergen Reactions    Menthol Rash         Physical Exam:    Body surface area is 2 77 meters squared  Wt Readings from Last 3 Encounters:   05/10/18 (!) 168 kg (370 lb)   05/03/18 (!) 167 kg (369 lb)   04/19/18 (!) 171 kg (378 lb 1 4 oz)        Temp Readings from Last 3 Encounters:   05/10/18 (!) 97 4 °F (36 3 °C) (Oral)   04/19/18 98 °F (36 7 °C) (Oral)   04/12/18 98 2 °F (36 8 °C) (Oral)        BP Readings from Last 3 Encounters:   05/10/18 128/82   05/03/18 140/90   04/19/18 126/60         Pulse Readings from Last 3 Encounters:   05/10/18 87   05/03/18 68   04/19/18 65       Physical Exam     Constitutional   General appearance: No acute distress, well appearing and well nourished  Eyes   Conjunctiva and lids: No swelling, erythema or discharge  Pupils and irises: Equal, round and reactive to light  Ears, Nose, Mouth, and Throat   External inspection of ears and nose: Normal     Nasal mucosa, septum, and turbinates: Normal without edema or erythema  Oropharynx: Normal with no erythema, edema, exudate or lesions  Pulmonary   Respiratory effort: No increased work of breathing or signs of respiratory distress  Auscultation of lungs: Clear to auscultation  Cardiovascular   Palpation of heart: Normal PMI, no thrills  Auscultation of heart: Normal rate and rhythm, normal S1 and S2, without murmurs      Examination of extremities for edema and/or varicosities: Normal     Carotid pulses: Normal     Abdomen   Abdomen: Non-tender, no masses  Liver and spleen: No hepatomegaly or splenomegaly  Lymphatic   Palpation of lymph nodes in neck: No lymphadenopathy  Musculoskeletal   Gait and station: Normal     Digits and nails: Normal without clubbing or cyanosis  Inspection/palpation of joints, bones, and muscles: Normal     Skin   Skin and subcutaneous tissue: Normal without rashes or lesions  Neurologic   Cranial nerves: Cranial nerves 2-12 intact  Sensation: No sensory loss  Psychiatric   Orientation to person, place, and time: Normal     Mood and affect: Normal           Assessment/ Plan: This is a pleasant 65-year-old male who was referred to see us for some abnormal MRI findings along with the M spike on his blood work  Urine electrophoresis showed no M spike  He is not anemic, has a normal calcium and kidney function and has no other signs or symptoms of myeloma  However his hemoglobin has been running high over the last year at least  His white count is also been running high  Myeloproliferative disorder is definitely a possibility  At this point I will do a complete myeloma workup to include an SPEP with immunofixation, free light chains, immunoglobulin levels along with a repeat CBC differential and CMP  I will also do a myeloproliferative workup because of his elevated blood counts  I went over all of this in great length with the patient  I explained the rationale behind doing the testing  The patient agreed with the testing  I did offer to consider doing a bone marrow biopsy with the patient states she would rather have blood work done first and if there are any abnormalities or indications that a bone marrow needs to be done only than what he consented that  I think this is reasonable  I advised him if he needs to have this cholecystectomy secondary to his symptoms  His reasonable to consider  This blood work should not affect his ability to have his gallbladder removed   I'll see him back in a few weeks with the results of the above testing  Until then if he has any questions he will call our office  Goals and Barriers:  Current Goal:  Workup of abnormal blood work and MRI  Barriers: None  Patient's Capacity to Self Care:  Patient  able to self care  Portions of the record may have been created with voice recognition software   Occasional wrong word or "sound a like" substitutions may have occurred due to the inherent limitations of voice recognition software   Read the chart carefully and recognize, using context, where substitutions have occurred

## 2018-05-15 ENCOUNTER — CLINICAL SUPPORT (OUTPATIENT)
Dept: INTERNAL MEDICINE CLINIC | Facility: CLINIC | Age: 38
End: 2018-05-15
Payer: COMMERCIAL

## 2018-05-15 DIAGNOSIS — D47.1 MYELOPROLIFERATIVE DISORDER (HCC): ICD-10-CM

## 2018-05-15 DIAGNOSIS — D75.9 BONE MARROW DISORDER: Primary | ICD-10-CM

## 2018-05-15 DIAGNOSIS — D47.2 GAMMOPATHY WITH MULTIPLE M SPIKES: ICD-10-CM

## 2018-05-15 PROCEDURE — 36415 COLL VENOUS BLD VENIPUNCTURE: CPT

## 2018-05-24 LAB
ALBUMIN SERPL ELPH-MCNC: 4 G/DL (ref 2.9–4.4)
ALBUMIN SERPL-MCNC: 4.2 G/DL (ref 3.5–5.5)
ALBUMIN/GLOB SERPL: 1.3 {RATIO} (ref 0.7–1.7)
ALBUMIN/GLOB SERPL: 1.4 {RATIO} (ref 1.2–2.2)
ALP SERPL-CCNC: 91 IU/L (ref 39–117)
ALPHA1 GLOB SERPL ELPH-MCNC: 0.2 G/DL (ref 0–0.4)
ALPHA2 GLOB SERPL ELPH-MCNC: 0.7 G/DL (ref 0.4–1)
ALT SERPL-CCNC: 50 IU/L (ref 0–44)
AST SERPL-CCNC: 28 IU/L (ref 0–40)
B-GLOBULIN SERPL ELPH-MCNC: 1 G/DL (ref 0.7–1.3)
B2 MICROGLOB SERPL-MCNC: 1.8 MG/L (ref 0.6–2.4)
BASOPHILS # BLD AUTO: 0 X10E3/UL (ref 0–0.2)
BASOPHILS NFR BLD AUTO: 0 %
BCR-ABL1 B2A2 BLD/T QL: NORMAL %
BCR-ABL1 B3A2 BLD/T QL: NORMAL %
BILIRUB SERPL-MCNC: 0.4 MG/DL (ref 0–1.2)
BUN SERPL-MCNC: 16 MG/DL (ref 6–20)
BUN/CREAT SERPL: 18 (ref 9–20)
CALCIUM SERPL-MCNC: 9.4 MG/DL (ref 8.7–10.2)
CHLORIDE SERPL-SCNC: 98 MMOL/L (ref 96–106)
CO2 SERPL-SCNC: 30 MMOL/L (ref 18–29)
CREAT SERPL-MCNC: 0.89 MG/DL (ref 0.76–1.27)
EOSINOPHIL # BLD AUTO: 0.4 X10E3/UL (ref 0–0.4)
EOSINOPHIL NFR BLD AUTO: 5 %
ERYTHROCYTE [DISTWIDTH] IN BLOOD BY AUTOMATED COUNT: 13.8 % (ref 12.3–15.4)
GAMMA GLOB SERPL ELPH-MCNC: 1.2 G/DL (ref 0.4–1.8)
GLOBULIN SER CALC-MCNC: 3.1 G/DL (ref 2.2–3.9)
GLOBULIN SER-MCNC: 2.9 G/DL (ref 1.5–4.5)
GLUCOSE SERPL-MCNC: 79 MG/DL (ref 65–99)
HCT VFR BLD AUTO: 47.9 % (ref 37.5–51)
HGB BLD-MCNC: 15.7 G/DL (ref 13–17.7)
IGA SERPL-MCNC: 266 MG/DL (ref 90–386)
IGG SERPL-MCNC: 1255 MG/DL (ref 700–1600)
IGM SERPL-MCNC: 88 MG/DL (ref 20–172)
IMM GRANULOCYTES # BLD: 0 X10E3/UL (ref 0–0.1)
IMM GRANULOCYTES NFR BLD: 0 %
JAK2 P.V617F BLD/T QL: NORMAL
KAPPA LC FREE SER-MCNC: 19.7 MG/L (ref 3.3–19.4)
KAPPA LC FREE/LAMBDA FREE SER: 1.01 {RATIO} (ref 0.26–1.65)
LAB DIRECTOR NAME PROVIDER: NORMAL
LAB DIRECTOR NAME PROVIDER: NORMAL
LABORATORY COMMENT REPORT: NORMAL
LAMBDA LC FREE SERPL-MCNC: 19.6 MG/L (ref 5.7–26.3)
LYMPHOCYTES # BLD AUTO: 2.4 X10E3/UL (ref 0.7–3.1)
LYMPHOCYTES NFR BLD AUTO: 24 %
M PROTEIN SERPL ELPH-MCNC: NORMAL G/DL
MCH RBC QN AUTO: 30 PG (ref 26.6–33)
MCHC RBC AUTO-ENTMCNC: 32.8 G/DL (ref 31.5–35.7)
MCV RBC AUTO: 92 FL (ref 79–97)
MONOCYTES # BLD AUTO: 0.5 X10E3/UL (ref 0.1–0.9)
MONOCYTES NFR BLD AUTO: 5 %
NEUTROPHILS # BLD AUTO: 6.4 X10E3/UL (ref 1.4–7)
NEUTROPHILS NFR BLD AUTO: 66 %
PATH INTERP SPEC-IMP: NORMAL
PLATELET # BLD AUTO: 283 X10E3/UL (ref 150–379)
POTASSIUM SERPL-SCNC: 4.7 MMOL/L (ref 3.5–5.2)
PROT SERPL-MCNC: 7.1 G/DL (ref 6–8.5)
RBC # BLD AUTO: 5.23 X10E6/UL (ref 4.14–5.8)
REF LAB TEST METHOD: NORMAL
REF LAB TEST METHOD: NORMAL
SL AMB EGFR AFRICAN AMERICAN: 127 ML/MIN/1.73
SL AMB EGFR NON AFRICAN AMERICAN: 110 ML/MIN/1.73
SL AMB INTERPRETATION: NORMAL
SODIUM SERPL-SCNC: 142 MMOL/L (ref 134–144)
T(ABL1,BCR)E1A2/CONTROL BLD/T: NORMAL %
WBC # BLD AUTO: 9.7 X10E3/UL (ref 3.4–10.8)

## 2018-06-22 ENCOUNTER — OFFICE VISIT (OUTPATIENT)
Dept: HEMATOLOGY ONCOLOGY | Facility: CLINIC | Age: 38
End: 2018-06-22
Payer: COMMERCIAL

## 2018-06-22 ENCOUNTER — TELEPHONE (OUTPATIENT)
Dept: HEMATOLOGY ONCOLOGY | Facility: CLINIC | Age: 38
End: 2018-06-22

## 2018-06-22 VITALS
TEMPERATURE: 97.4 F | SYSTOLIC BLOOD PRESSURE: 116 MMHG | WEIGHT: 315 LBS | HEIGHT: 72 IN | OXYGEN SATURATION: 94 % | RESPIRATION RATE: 16 BRPM | BODY MASS INDEX: 42.66 KG/M2 | HEART RATE: 88 BPM | DIASTOLIC BLOOD PRESSURE: 72 MMHG

## 2018-06-22 DIAGNOSIS — D75.9 BONE MARROW DISORDER: Primary | ICD-10-CM

## 2018-06-22 PROCEDURE — 99214 OFFICE O/P EST MOD 30 MIN: CPT | Performed by: INTERNAL MEDICINE

## 2018-07-06 ENCOUNTER — OFFICE VISIT (OUTPATIENT)
Dept: PAIN MEDICINE | Facility: CLINIC | Age: 38
End: 2018-07-06
Payer: COMMERCIAL

## 2018-07-06 VITALS
SYSTOLIC BLOOD PRESSURE: 116 MMHG | WEIGHT: 315 LBS | HEIGHT: 72 IN | BODY MASS INDEX: 42.66 KG/M2 | DIASTOLIC BLOOD PRESSURE: 80 MMHG | HEART RATE: 84 BPM

## 2018-07-06 DIAGNOSIS — M48.061 SPINAL STENOSIS OF LUMBAR REGION WITHOUT NEUROGENIC CLAUDICATION: ICD-10-CM

## 2018-07-06 DIAGNOSIS — R29.898 WEAKNESS OF LEFT LOWER EXTREMITY: Primary | ICD-10-CM

## 2018-07-06 DIAGNOSIS — M48.07 FORAMINAL STENOSIS OF LUMBOSACRAL REGION: ICD-10-CM

## 2018-07-06 DIAGNOSIS — M54.16 LUMBAR RADICULITIS: ICD-10-CM

## 2018-07-06 PROCEDURE — 99213 OFFICE O/P EST LOW 20 MIN: CPT | Performed by: PHYSICAL MEDICINE & REHABILITATION

## 2018-07-06 NOTE — PROGRESS NOTES
Assessment/Plan:      Diagnoses and all orders for this visit:    Weakness of left lower extremity    Lumbar radiculitis    Foraminal stenosis of lumbosacral region    Spinal stenosis of lumbar region without neurogenic claudication        Discussion:  There is nothing in his imaging was physical exam that would suggest there is a spinal origin for his erectile problems  He was advised to see his PCP for further evaluation  At this time he can safely be seen on an as-needed basis he is doing everything he can to prevent recurrences including aggressively losing weight and trying to get in better shape  Subjective:     Patient ID: Dunia Dash is a 41 y o  male  HPI Follow up, last seen 5/3 with less numbness n left leg and improving tolerance for bowling  Today  eft leg feel "good", bowling once a week, NO LBP  Has some trouble with repeated erection  Review of Systems   Respiratory: Negative  Cardiovascular: Negative  Gastrointestinal: Negative  Genitourinary: Negative for difficulty urinating and dysuria  Objective:  Records:  HemOnc:  Assessment / Plan:    The patient is a pleasant 56-year-old male who was referred to see us for some abnormal MRI findings along with small M spike in his blood work  I repeated all his blood work  He has no M spike  He is not anemic  He has a normal kidney function  His free light chain ratio is normal as are his immunoglobulins  JEK2 testing was negative  There is no evidence of myeloma on his blood work or myeloproliferative disorder  Flow cytometry did not show any abnormalities either  At this point I see no abnormalities on his peripheral CBC or the testing we did  I think a bone marrow biopsy would be low yield  I explained all this to him  I explained to him he still has the option to do it if he wishes but based on the testing so far I doubt he has a myeloproliferative disorder or myeloma  The patient is reassured with this   I will refer him back to his primary care physician  He can see us on an as needed basis       Physical Exam   Constitutional: He is oriented to person, place, and time  He appears well-developed  No distress  HENT:   Head: Normocephalic  Eyes: Pupils are equal, round, and reactive to light  Neurological: He is alert and oriented to person, place, and time  No cranial nerve deficit  Coordination normal    Reflex Scores:       Tricep reflexes are 1+ on the right side and 1+ on the left side  Bicep reflexes are 1+ on the right side and 1+ on the left side  Brachioradialis reflexes are 1+ on the right side and 1+ on the left side  Patellar reflexes are 1+ on the right side and 1+ on the left side  Achilles reflexes are 1+ on the right side and 1+ on the left side  No gait or motor deficits

## 2018-07-06 NOTE — LETTER
July 6, 2018     Manuel Grayson MD  [address] [place] [address]    Patient: Chio Jovel   YOB: 1980   Date of Visit: 7/6/2018       Dear Dr Carmella Robles:    Thank you for referring Obdulio Zan to me for evaluation  Below are the relevant portions of my assessment and plan of care  Diagnoses and all orders for this visit:    Weakness of left lower extremity    Lumbar radiculitis    Foraminal stenosis of lumbosacral region    Spinal stenosis of lumbar region without neurogenic claudication        If you have questions, please do not hesitate to call me  I look forward to following Bennett along with you           Sincerely,        Thu Rivas, DO        CC: No Recipients

## 2018-07-07 ENCOUNTER — HOSPITAL ENCOUNTER (INPATIENT)
Facility: HOSPITAL | Age: 38
LOS: 1 days | Discharge: HOME | DRG: 445 | End: 2018-07-08
Attending: EMERGENCY MEDICINE | Admitting: SURGERY
Payer: COMMERCIAL

## 2018-07-07 ENCOUNTER — APPOINTMENT (EMERGENCY)
Dept: RADIOLOGY | Facility: HOSPITAL | Age: 38
DRG: 445 | End: 2018-07-07
Payer: COMMERCIAL

## 2018-07-07 DIAGNOSIS — K80.20 CHOLELITHIASIS: Primary | ICD-10-CM

## 2018-07-07 DIAGNOSIS — K80.50 CHOLEDOCHOLITHIASIS: ICD-10-CM

## 2018-07-07 LAB
ALBUMIN SERPL BCP-MCNC: 3.5 G/DL (ref 3.5–5)
ALP SERPL-CCNC: 117 U/L (ref 46–116)
ALT SERPL W P-5'-P-CCNC: 224 U/L (ref 12–78)
ANION GAP SERPL CALCULATED.3IONS-SCNC: 4 MMOL/L (ref 4–13)
AST SERPL W P-5'-P-CCNC: 164 U/L (ref 5–45)
BASOPHILS # BLD AUTO: 0.03 THOUSANDS/ΜL (ref 0–0.1)
BASOPHILS NFR BLD AUTO: 0 % (ref 0–1)
BILIRUB SERPL-MCNC: 1.61 MG/DL (ref 0.2–1)
BUN SERPL-MCNC: 13 MG/DL (ref 5–25)
CALCIUM SERPL-MCNC: 8.6 MG/DL (ref 8.3–10.1)
CHLORIDE SERPL-SCNC: 102 MMOL/L (ref 100–108)
CO2 SERPL-SCNC: 32 MMOL/L (ref 21–32)
CREAT SERPL-MCNC: 1.15 MG/DL (ref 0.6–1.3)
EOSINOPHIL # BLD AUTO: 0.19 THOUSAND/ΜL (ref 0–0.61)
EOSINOPHIL NFR BLD AUTO: 2 % (ref 0–6)
ERYTHROCYTE [DISTWIDTH] IN BLOOD BY AUTOMATED COUNT: 13.3 % (ref 11.6–15.1)
GFR SERPL CREATININE-BSD FRML MDRD: 81 ML/MIN/1.73SQ M
GLUCOSE SERPL-MCNC: 103 MG/DL (ref 65–140)
HCT VFR BLD AUTO: 48.6 % (ref 36.5–49.3)
HGB BLD-MCNC: 15.6 G/DL (ref 12–17)
HOLD SPECIMEN: NORMAL
IMM GRANULOCYTES # BLD AUTO: 0.06 THOUSAND/UL (ref 0–0.2)
IMM GRANULOCYTES NFR BLD AUTO: 1 % (ref 0–2)
LIPASE SERPL-CCNC: 86 U/L (ref 73–393)
LYMPHOCYTES # BLD AUTO: 1.77 THOUSANDS/ΜL (ref 0.6–4.47)
LYMPHOCYTES NFR BLD AUTO: 14 % (ref 14–44)
MCH RBC QN AUTO: 30.1 PG (ref 26.8–34.3)
MCHC RBC AUTO-ENTMCNC: 32.1 G/DL (ref 31.4–37.4)
MCV RBC AUTO: 94 FL (ref 82–98)
MONOCYTES # BLD AUTO: 0.73 THOUSAND/ΜL (ref 0.17–1.22)
MONOCYTES NFR BLD AUTO: 6 % (ref 4–12)
NEUTROPHILS # BLD AUTO: 9.8 THOUSANDS/ΜL (ref 1.85–7.62)
NEUTS SEG NFR BLD AUTO: 77 % (ref 43–75)
NRBC BLD AUTO-RTO: 0 /100 WBCS
PLATELET # BLD AUTO: 292 THOUSANDS/UL (ref 149–390)
PMV BLD AUTO: 9.4 FL (ref 8.9–12.7)
POTASSIUM SERPL-SCNC: 4.3 MMOL/L (ref 3.5–5.3)
PROT SERPL-MCNC: 7.8 G/DL (ref 6.4–8.2)
RBC # BLD AUTO: 5.19 MILLION/UL (ref 3.88–5.62)
SODIUM SERPL-SCNC: 138 MMOL/L (ref 136–145)
WBC # BLD AUTO: 12.58 THOUSAND/UL (ref 4.31–10.16)

## 2018-07-07 PROCEDURE — 85025 COMPLETE CBC W/AUTO DIFF WBC: CPT | Performed by: EMERGENCY MEDICINE

## 2018-07-07 PROCEDURE — 36415 COLL VENOUS BLD VENIPUNCTURE: CPT

## 2018-07-07 PROCEDURE — 80053 COMPREHEN METABOLIC PANEL: CPT | Performed by: EMERGENCY MEDICINE

## 2018-07-07 PROCEDURE — 83690 ASSAY OF LIPASE: CPT | Performed by: EMERGENCY MEDICINE

## 2018-07-07 PROCEDURE — 76705 ECHO EXAM OF ABDOMEN: CPT

## 2018-07-07 PROCEDURE — 96374 THER/PROPH/DIAG INJ IV PUSH: CPT

## 2018-07-07 RX ORDER — KETOROLAC TROMETHAMINE 30 MG/ML
15 INJECTION, SOLUTION INTRAMUSCULAR; INTRAVENOUS ONCE
Status: COMPLETED | OUTPATIENT
Start: 2018-07-07 | End: 2018-07-07

## 2018-07-07 RX ORDER — ACETAMINOPHEN 325 MG/1
975 TABLET ORAL ONCE
Status: COMPLETED | OUTPATIENT
Start: 2018-07-07 | End: 2018-07-07

## 2018-07-07 RX ADMIN — KETOROLAC TROMETHAMINE 15 MG: 30 INJECTION, SOLUTION INTRAMUSCULAR at 22:00

## 2018-07-07 RX ADMIN — ACETAMINOPHEN 975 MG: 325 TABLET ORAL at 22:04

## 2018-07-08 ENCOUNTER — APPOINTMENT (INPATIENT)
Dept: RADIOLOGY | Facility: HOSPITAL | Age: 38
DRG: 445 | End: 2018-07-08
Payer: COMMERCIAL

## 2018-07-08 VITALS
RESPIRATION RATE: 20 BRPM | BODY MASS INDEX: 41.75 KG/M2 | HEIGHT: 73 IN | OXYGEN SATURATION: 94 % | SYSTOLIC BLOOD PRESSURE: 137 MMHG | WEIGHT: 315 LBS | TEMPERATURE: 97.7 F | DIASTOLIC BLOOD PRESSURE: 86 MMHG | HEART RATE: 77 BPM

## 2018-07-08 PROBLEM — K80.70 CHOLELITHIASIS WITH CHOLEDOCHOLITHIASIS: Status: ACTIVE | Noted: 2018-04-19

## 2018-07-08 LAB
ALBUMIN SERPL BCP-MCNC: 3.1 G/DL (ref 3.5–5)
ALP SERPL-CCNC: 122 U/L (ref 46–116)
ALT SERPL W P-5'-P-CCNC: 383 U/L (ref 12–78)
ANION GAP SERPL CALCULATED.3IONS-SCNC: 2 MMOL/L (ref 4–13)
AST SERPL W P-5'-P-CCNC: 267 U/L (ref 5–45)
BASOPHILS # BLD AUTO: 0.04 THOUSANDS/ΜL (ref 0–0.1)
BASOPHILS NFR BLD AUTO: 0 % (ref 0–1)
BILIRUB SERPL-MCNC: 1.79 MG/DL (ref 0.2–1)
BUN SERPL-MCNC: 11 MG/DL (ref 5–25)
CALCIUM SERPL-MCNC: 8.4 MG/DL (ref 8.3–10.1)
CHLORIDE SERPL-SCNC: 105 MMOL/L (ref 100–108)
CO2 SERPL-SCNC: 31 MMOL/L (ref 21–32)
CREAT SERPL-MCNC: 0.83 MG/DL (ref 0.6–1.3)
EOSINOPHIL # BLD AUTO: 0.29 THOUSAND/ΜL (ref 0–0.61)
EOSINOPHIL NFR BLD AUTO: 3 % (ref 0–6)
ERYTHROCYTE [DISTWIDTH] IN BLOOD BY AUTOMATED COUNT: 13.5 % (ref 11.6–15.1)
GFR SERPL CREATININE-BSD FRML MDRD: 113 ML/MIN/1.73SQ M
GLUCOSE SERPL-MCNC: 102 MG/DL (ref 65–140)
HCT VFR BLD AUTO: 50.5 % (ref 36.5–49.3)
HGB BLD-MCNC: 15.9 G/DL (ref 12–17)
IMM GRANULOCYTES # BLD AUTO: 0.04 THOUSAND/UL (ref 0–0.2)
IMM GRANULOCYTES NFR BLD AUTO: 0 % (ref 0–2)
LYMPHOCYTES # BLD AUTO: 2.07 THOUSANDS/ΜL (ref 0.6–4.47)
LYMPHOCYTES NFR BLD AUTO: 22 % (ref 14–44)
MCH RBC QN AUTO: 29.9 PG (ref 26.8–34.3)
MCHC RBC AUTO-ENTMCNC: 31.5 G/DL (ref 31.4–37.4)
MCV RBC AUTO: 95 FL (ref 82–98)
MONOCYTES # BLD AUTO: 0.73 THOUSAND/ΜL (ref 0.17–1.22)
MONOCYTES NFR BLD AUTO: 8 % (ref 4–12)
NEUTROPHILS # BLD AUTO: 6.45 THOUSANDS/ΜL (ref 1.85–7.62)
NEUTS SEG NFR BLD AUTO: 67 % (ref 43–75)
NRBC BLD AUTO-RTO: 0 /100 WBCS
PLATELET # BLD AUTO: 284 THOUSANDS/UL (ref 149–390)
PMV BLD AUTO: 9.3 FL (ref 8.9–12.7)
POTASSIUM SERPL-SCNC: 3.7 MMOL/L (ref 3.5–5.3)
PROT SERPL-MCNC: 7.3 G/DL (ref 6.4–8.2)
RBC # BLD AUTO: 5.32 MILLION/UL (ref 3.88–5.62)
SODIUM SERPL-SCNC: 138 MMOL/L (ref 136–145)
WBC # BLD AUTO: 9.62 THOUSAND/UL (ref 4.31–10.16)

## 2018-07-08 PROCEDURE — 80053 COMPREHEN METABOLIC PANEL: CPT | Performed by: STUDENT IN AN ORGANIZED HEALTH CARE EDUCATION/TRAINING PROGRAM

## 2018-07-08 PROCEDURE — 99222 1ST HOSP IP/OBS MODERATE 55: CPT | Performed by: INTERNAL MEDICINE

## 2018-07-08 PROCEDURE — 74181 MRI ABDOMEN W/O CONTRAST: CPT

## 2018-07-08 PROCEDURE — 99285 EMERGENCY DEPT VISIT HI MDM: CPT

## 2018-07-08 PROCEDURE — 85025 COMPLETE CBC W/AUTO DIFF WBC: CPT | Performed by: STUDENT IN AN ORGANIZED HEALTH CARE EDUCATION/TRAINING PROGRAM

## 2018-07-08 PROCEDURE — 99235 HOSP IP/OBS SAME DATE MOD 70: CPT | Performed by: SURGERY

## 2018-07-08 RX ORDER — SODIUM CHLORIDE 9 MG/ML
125 INJECTION, SOLUTION INTRAVENOUS CONTINUOUS
Status: DISCONTINUED | OUTPATIENT
Start: 2018-07-08 | End: 2018-07-08 | Stop reason: HOSPADM

## 2018-07-08 RX ORDER — ONDANSETRON 2 MG/ML
4 INJECTION INTRAMUSCULAR; INTRAVENOUS EVERY 6 HOURS PRN
Status: DISCONTINUED | OUTPATIENT
Start: 2018-07-08 | End: 2018-07-08 | Stop reason: HOSPADM

## 2018-07-08 RX ORDER — OXYCODONE HYDROCHLORIDE 5 MG/1
5 TABLET ORAL EVERY 4 HOURS PRN
Status: DISCONTINUED | OUTPATIENT
Start: 2018-07-08 | End: 2018-07-08 | Stop reason: HOSPADM

## 2018-07-08 RX ORDER — OXYCODONE HYDROCHLORIDE 10 MG/1
10 TABLET ORAL EVERY 4 HOURS PRN
Status: DISCONTINUED | OUTPATIENT
Start: 2018-07-08 | End: 2018-07-08 | Stop reason: HOSPADM

## 2018-07-08 RX ORDER — HEPARIN SODIUM 5000 [USP'U]/ML
5000 INJECTION, SOLUTION INTRAVENOUS; SUBCUTANEOUS EVERY 8 HOURS SCHEDULED
Status: DISCONTINUED | OUTPATIENT
Start: 2018-07-08 | End: 2018-07-08 | Stop reason: HOSPADM

## 2018-07-08 RX ORDER — ACETAMINOPHEN 325 MG/1
650 TABLET ORAL EVERY 6 HOURS PRN
Status: DISCONTINUED | OUTPATIENT
Start: 2018-07-08 | End: 2018-07-08 | Stop reason: HOSPADM

## 2018-07-08 RX ADMIN — HEPARIN SODIUM 5000 UNITS: 5000 INJECTION, SOLUTION INTRAVENOUS; SUBCUTANEOUS at 14:24

## 2018-07-08 RX ADMIN — HEPARIN SODIUM 5000 UNITS: 5000 INJECTION, SOLUTION INTRAVENOUS; SUBCUTANEOUS at 06:02

## 2018-07-08 RX ADMIN — SODIUM CHLORIDE 125 ML/HR: 0.9 INJECTION, SOLUTION INTRAVENOUS at 14:26

## 2018-07-08 RX ADMIN — SODIUM CHLORIDE 125 ML/HR: 0.9 INJECTION, SOLUTION INTRAVENOUS at 02:13

## 2018-07-08 NOTE — ED PROVIDER NOTES
History  Chief Complaint   Patient presents with    Abdominal Pain     mid epigastric abdominal pain " i think its my gallbladder", here 4/18 for same, no fever,  no N/V/D     Patient is a 41year old male with a history of cholelithiasis who presents with non-radiating epigastric bloating abdominal pain without associated nausea/vomiting/fever/chills/diarrhea/hematochezia/melena  Pain began today at 4 pm while he was eating water ice  The pain gradually worsened until he was forced to come in  Pain is currently 8/10  He took Tylenol eariler with minor relief  He denies recent heavy alcohol use or NSAID use  Patient was admitted to [facility] this April for symptomatic cholelithiasis  Patient at that time decided against an elective cholecystectomy  He tells me that the pain he is experiencing now is exactly the same as the pain he had during his last admission  Prior to Admission Medications   Prescriptions Last Dose Informant Patient Reported? Taking? Blood Glucose Monitoring Suppl (ONE TOUCH ULTRA 2) w/Device KIT   Yes No   Sig: by Does not apply route daily   cetirizine (ZyrTEC) 10 mg tablet   No No   Sig: Take 1 tablet (10 mg total) by mouth daily for 7 days   ibuprofen (ADVIL) 200 mg tablet   Yes No   Sig: Take by mouth   metFORMIN (GLUCOPHAGE) 500 mg tablet   No No   Sig: Take 1 tablet (500 mg total) by mouth 2 (two) times a day with meals   methocarbamol (ROBAXIN) 750 mg tablet   Yes No   Sig: TAKE 1 TO 2 TABLETS 3 TIMES DAILY AS NEEDED        Facility-Administered Medications: None       Past Medical History:   Diagnosis Date    [facility]     Diabetes mellitus ([address] )     other type with circulatory complication, without long-term current    Hearing loss     r ear only    Obesity     Onychomycosis of multiple toenails with type 2 diabetes mellitus ([address] )     last assessed: 3/21/2017       Past Surgical History:   Procedure Laterality Date    MOUTH SURGERY  12/28/2017    TONSILLECTOMY AND ADENOIDECTOMY         Family History   Problem Relation Age of Onset    Diabetes Mother     Stomach cancer Other      I have reviewed and agree with the history as documented  Social History   Substance Use Topics    Smoking status: Never Smoker    Smokeless tobacco: Never Used    Alcohol use Yes      Comment: 1x weekly; social alcohol use (as per allscripts)        Review of Systems   Constitutional: Negative for chills, diaphoresis, fatigue and fever  Respiratory: Negative for cough, chest tightness and shortness of breath  Cardiovascular: Negative for chest pain  Gastrointestinal: Positive for abdominal pain  Negative for abdominal distention, blood in stool, constipation, diarrhea, nausea, rectal pain and vomiting  Genitourinary: Negative for dysuria  Neurological: Negative for weakness, light-headedness and numbness  Physical Exam  ED Triage Vitals   Temperature Pulse Respirations Blood Pressure SpO2   07/07/18 2050 07/07/18 2050 07/07/18 2050 07/07/18 2050 07/07/18 2050   (!) 97 4 °F (36 3 °C) (!) 54 18 141/84 95 %      Temp Source Heart Rate Source Patient Position - Orthostatic VS BP Location FiO2 (%)   07/07/18 2050 07/07/18 2050 07/07/18 2050 07/07/18 2050 --   Oral Monitor Sitting Right arm       Pain Score       07/07/18 2137       9           Orthostatic Vital Signs  Vitals:    07/07/18 2050 07/07/18 2137   BP: 141/84 161/72   Pulse: (!) 54 55   Patient Position - Orthostatic VS: Sitting Lying       Physical Exam   Constitutional: He is oriented to person, place, and time  He appears well-developed and well-nourished  No distress  HENT:   Head: Normocephalic  Cardiovascular: Normal rate, regular rhythm, normal heart sounds and intact distal pulses  Pulmonary/Chest: Effort normal and breath sounds normal    Abdominal: Soft  Bowel sounds are normal  He exhibits no distension and no mass  There is tenderness  There is no rebound and no guarding  Neurological: He is alert and oriented to person, place, and time  No cranial nerve deficit or sensory deficit  He exhibits normal muscle tone  ED Medications  Medications   ketorolac (TORADOL) injection 15 mg (15 mg Intravenous Given 7/7/18 2200)   acetaminophen (TYLENOL) tablet 975 mg (975 mg Oral Given 7/7/18 2204)       Diagnostic Studies  Results Reviewed     Procedure Component Value Units Date/Time    Comprehensive metabolic panel [[id number]]  (Abnormal) Collected:  07/07/18 2056    Lab Status:  Final result Specimen:  Blood from Arm, Left Updated:  07/07/18 2127     Sodium 138 mmol/L      Potassium 4 3 mmol/L      Chloride 102 mmol/L      CO2 32 mmol/L      Anion Gap 4 mmol/L      BUN 13 mg/dL      Creatinine 1 15 mg/dL      Glucose 103 mg/dL      Calcium 8 6 mg/dL       (H) U/L       (H) U/L      Alkaline Phosphatase 117 (H) U/L      Total Protein 7 8 g/dL      Albumin 3 5 g/dL      Total Bilirubin 1 61 (H) mg/dL      eGFR 81 ml/min/1 73sq m     Narrative:         National Kidney Disease Education Program recommendations are as follows:  GFR calculation is accurate only with a steady state creatinine  Chronic Kidney disease less than 60 ml/min/1 73 sq  meters  Kidney failure less than 15 ml/min/1 73 sq  meters      Lipase [[id number]]  (Normal) Collected:  07/07/18 2056    Lab Status:  Final result Specimen:  Blood from Arm, Left Updated:  07/07/18 2127     Lipase 86 u/L     CBC and differential [[id number]]  (Abnormal) Collected:  07/07/18 2056    Lab Status:  Final result Specimen:  Blood from Arm, Left Updated:  07/07/18 2108     WBC 12 58 (H) Thousand/uL      RBC 5 19 Million/uL      Hemoglobin 15 6 g/dL      Hematocrit 48 6 %      MCV 94 fL      MCH 30 1 pg      MCHC 32 1 g/dL      RDW 13 3 %      MPV 9 4 fL      Platelets 885 Thousands/uL      nRBC 0 /100 WBCs      Neutrophils Relative 77 (H) %      Immat GRANS % 1 %      Lymphocytes Relative 14 %      Monocytes Relative 6 % Eosinophils Relative 2 %      Basophils Relative 0 %      Neutrophils Absolute 9 80 (H) Thousands/µL      Immature Grans Absolute 0 06 Thousand/uL      Lymphocytes Absolute 1 77 Thousands/µL      Monocytes Absolute 0 73 Thousand/µL      Eosinophils Absolute 0 19 Thousand/µL      Basophils Absolute 0 03 Thousands/µL                  US right upper quadrant   Final Result by Caryn Monique MD (07/07 2340)      1  Cholelithiasis without sonographic evidence of cholecystitis  2   The common bile duct is prominent in size measuring 8 mm  No evidence of proximal choledocholithiasis  3   Echogenic focus within the gallbladder fundus which may represent adenomyomatosis  Workstation performed: [id number]               Procedures  Procedures      Phone Consults  ED Phone Contact    ED Course  ED Course as of Jul 08 0038   Sat Jul 07, 2018   2158 AST: (!) 164   2158 ALT: (!) 224   2158 Liver enzymes elevated Alkaline Phosphatase: (!) 117   2158 No pancreatitis Lipase: 86   Sun Jul 08, 2018   0019 Patient resting comfortably in bed                                MDM  Number of Diagnoses or Management Options  Cholelithiasis:   Diagnosis management comments: Patient is a 41year old male who presents with bloating abdominal pain, elevated LFT's, and a dilated bile duct concerning for choledocholithiasis vs  Cholecystitis vs  Biliary colic  Patient was consulted with [facility] and they will assess for the need for admission/cholecystectomy       CritCare Time    Disposition  Final diagnoses:   Cholelithiasis     Time reflects when diagnosis was documented in both MDM as applicable and the Disposition within this note     Time User Action Codes Description Comment    7/8/2018 12:26 AM Ever Ring Add [K80 20] Cholelithiasis       ED Disposition     None      Follow-up Information    None         Patient's Medications   Discharge Prescriptions    No medications on file     No discharge procedures on file     ED Provider  Attending physically available and evaluated Augie Smith  NOLA managed the patient along with the ED Attending      Electronically Signed by         Delio Ricci MD  07/08/18 5333

## 2018-07-08 NOTE — H&P
H&P Exam - General Surgery   Hitesh Felicaino 41 y o  male MRN: [id number]  Unit/Bed#: ED 16 Encounter: [id number]    Assessment/Plan     Assessment:  42 y/o M w/ epigastric, RUQ abdominal pain, along with CBD measuring 8mm on U/S, elevated LFTs, and elevated WBC count  --r/o Acute Choledocholithiasis    Plan:  --Admit to [facility] service  --c/s GI for ERCP  --Trend LFTs  --Lap isauro this admission  --Afebrile, not jaundiced, mental status intact-- less concerning for acute cholangitis  --Monitor VS    HPI:  Hitesh Feliciano is a 41 y o  male w/ PMH of DM and obesity who presents with epigastric and RUQ abdominal pain since noon today  Pt reports the pain as 9/10 abdominal "fullness," which primarily started in the epigastric region, but is now felt in the RUQ  Pain was not relieved by Pepto-Bismol or Tylenol  Denies any association of the pain with eating fatty foods  Does have a hx of GERD  Patient has had 2 prior episodes of pain similar to this  The 1st was in April of this year, requiring an ED visit  Right upper quadrant ultrasound in April showed cholelithiasis with a questionable trace of pericholecystic fluid and a common bile duct measuring 6 millimeters  The 2nd episode occurred in June, and patient reports the pain resolved without a visit to the hospital   Today's right upper quadrant ultrasound shows cholelithiasis without sonographic evidence of cholecystitis with a common bile duct measuring 8 millimeters  An echogenic focus was found within the fundus which may represent adenomymatosis  Review of Systems   Constitutional: Negative for activity change, appetite change, chills, diaphoresis, fatigue, fever and unexpected weight change  HENT: Negative for congestion  Respiratory: Negative for shortness of breath, wheezing and stridor  Cardiovascular: Negative for chest pain, palpitations and leg swelling  Gastrointestinal: Positive for abdominal pain   Negative for abdominal distention, anal bleeding, blood in stool, constipation, diarrhea, nausea, rectal pain and vomiting  Genitourinary: Negative for decreased urine volume, flank pain and urgency  Musculoskeletal: Negative for arthralgias, back pain, gait problem, joint swelling and myalgias  Skin: Negative for color change, pallor, rash and wound  Allergic/Immunologic: Negative for environmental allergies, food allergies and immunocompromised state  Neurological: Negative for seizures, speech difficulty, light-headedness and numbness  Psychiatric/Behavioral: Negative for agitation and confusion         Historical Information   Past Medical History:   Diagnosis Date    [facility] mellitus [facility] )     Diabetes mellitus ([place] [address] )     other type with circulatory complication, without long-term current    Hearing loss     r ear only    Obesity     Onychomycosis of multiple toenails with type 2 diabetes mellitus ([place] [address] )     last assessed: 3/21/2017     Past Surgical History:   Procedure Laterality Date    MOUTH SURGERY  12/28/2017    TONSILLECTOMY AND ADENOIDECTOMY       Social History   History   Alcohol Use    Yes     Comment: 1x weekly; social alcohol use (as per allscripts)     History   Drug Use No     History   Smoking Status    Never Smoker   Smokeless Tobacco    Never Used     Family History: non-contributory    Meds/Allergies   all medications and allergies reviewed  Allergies   Allergen Reactions    Menthol Rash       Objective   First Vitals:   Blood Pressure: 141/84 (07/07/18 2050)  Pulse: (!) 54 (07/07/18 2050)  Temperature: (!) 97 4 °F (36 3 °C) (07/07/18 2050)  Temp Source: Oral (07/07/18 2050)  Respirations: 18 (07/07/18 2050)  Weight - Scale: (!) 164 kg (361 lb 8 9 oz) (07/07/18 2050)  SpO2: 95 % (07/07/18 2050)    Current Vitals:   Blood Pressure: 161/72 (07/07/18 2137)  Pulse: 55 (07/07/18 2137)  Temperature: (!) 97 4 °F (36 3 °C) (07/07/18 2050)  Temp Source: Oral (07/07/18 2050)  Respirations: 18 (07/07/18 2137)  Weight - Scale: (!) 164 kg (361 lb 8 9 oz) (07/07/18 2050)  SpO2: 96 % (07/07/18 2137)    No intake or output data in the 24 hours ending 07/08/18 0034    Invasive Devices     Peripheral Intravenous Line            Peripheral IV 07/07/18 Right Antecubital less than 1 day                Physical Exam   Abdominal:            GEN: NAD  HEENT: MMM, sclera white b/l  CV: RRR  Lung: normal effort  Ab: + epigastric tenderness/RUQ tenderness to palpation, - Vaughn's sign   Extrem: No CCE  Neuro: A+Ox3, motor and sensation grossly intact  Skin: no jaundice noted      Lab Results:   CBC:   Lab Results   Component Value Date    WBC 12 58 (H) 07/07/2018    HGB 15 6 07/07/2018    HCT 48 6 07/07/2018    MCV 94 07/07/2018     07/07/2018    MCH 30 1 07/07/2018    MCHC 32 1 07/07/2018    RDW 13 3 07/07/2018    MPV 9 4 07/07/2018    NRBC 0 07/07/2018   , CMP:   Lab Results   Component Value Date     07/07/2018    K 4 3 07/07/2018     07/07/2018    CO2 32 07/07/2018    ANIONGAP 4 07/07/2018    BUN 13 07/07/2018    CREATININE 1 15 07/07/2018    GLUCOSE 103 07/07/2018    CALCIUM 8 6 07/07/2018     (H) 07/07/2018     (H) 07/07/2018    ALKPHOS 117 (H) 07/07/2018    PROT 7 8 07/07/2018    BILITOT 1 61 (H) 07/07/2018    EGFR 81 07/07/2018     Imaging:     Us Right Upper Quadrant    Result Date: 7/7/2018  Impression: 1  Cholelithiasis without sonographic evidence of cholecystitis  2   The common bile duct is prominent in size measuring 8 mm  No evidence of proximal choledocholithiasis  3   Echogenic focus within the gallbladder fundus which may represent adenomyomatosis  Workstation performed: [id number]       Code Status: Prior  Advance Directive and Living Will:      Power of :    POLST:      Counseling / Coordination of Care  Total floor / unit time spent today 30 minutes  Greater than 50% of total time was spent with the patient and / or family counseling and / or coordination of care

## 2018-07-08 NOTE — NURSING NOTE
Pt requesting to "check out" of the hospital now because he cannot miss work in the morning  Surgery paged

## 2018-07-08 NOTE — SOCIAL WORK
CM met with Pt with an introduction and explanation of role  Pt reported residing with his father and grandmother in a bilevel home with no use of DME and 7 steps to enter  Pt reported being independent with ADLs, is employed with no hx of VNA, SNF, mental health or drug/alcohol placements  Pt denied having a living will and reported the use of [facility] on [address]  Pt reported having Dr Liset Saha as a PCP  CM reviewed and provided Pt with a d/c check list     CM reviewed d/c planning process including the following: identifying help at home, patient preference for d/c planning needs, Discharge Lounge, [facility] Meds to Bed program, availability of treatment team to discuss questions or concerns patient and/or family may have regarding understanding medications and recognizing signs and symptoms once discharged  CM also encouraged patient to follow up with all recommended appointments after discharge  Patient advised of importance for patient and family to participate in managing patients medical well being

## 2018-07-08 NOTE — NURSING NOTE
Pt refusing to remove street clothing and sneakers for assessment, states he will remove them "before surgery"  Assessment completed on what RN could see

## 2018-07-08 NOTE — CONSULTS
Consultation -  [facility]  Augie Smith 41 y o  male MRN: [id number]  Unit/Bed#: [facility] 615-01 Encounter: [id number]        Inpatient consult to gastroenterology  Consult performed by: Benja Barahona ordered by: Meka Caba          Reason for Consult / Principal Problem: epigastric pain, CBD dilation, elevated LFTs    ASSESSMENT and PLAN:    Principal Problem:    Cholelithiasis with choledocholithiasis  Active Problems:    Type 2 diabetes mellitus ([facility] )    Morbid obesity ([facility] )    #1  Epigastric pain with elevated LFTs and common bile duct dilation:  Ultrasound showing the common bile duct is 8 mm but no obvious choledocholithiasis  Patient's abdominal pain has improved  LFTs are elevated with a T bili of 1 79, , , and alk-phos of 122 which have all worsened since yesterday  No elevated white blood cell count at this time  Afebrile  Differential includes common bile duct obstruction versus passed common bile duct stone  -will obtain an MRCP for further evaluation to determine the need for ERCP if there is a common bile duct obstruction currently  -clear liquids today  -continue to monitor LFTs closely  -surgical follow-up  Patient will ultimately need his gallbladder removed regardless of need for ERCP     -------------------------------------------------------------------------------------------------------------------    HPI:  This is a 45-year-old male with a history of obesity and diabetes mellitus who presented to the emergency room due to sudden onset of epigastric pain that started yesterday afternoon  He reports he has had similar episodes of this in the past   He reports he was here in April for similar issues and also had an episode that was self-limiting in June  He reports the pain is epigastric across his abdomen  He denies any nausea or vomiting  He denies any change in his bowel habits    He denies any diarrhea, constipation, blood in the stool, or black tarry stools  He denies any significant alcohol use, illicit drug use, or NSAID use  The patient denies any significant heartburn or difficulty swallowing his foods  His ultrasound is showing that he has stones and sludge within the gallbladder  Common bile duct is dilated at 8 mm but no signs of choledocholithiasis  Patient has never had an MRI of his abdomen in the past     At this time he no longer is having any abdominal pain  REVIEW OF SYSTEMS:    CONSTITUTIONAL: Denies any fever, chills, or rigors  Decreased appetite, and no recent weight loss  HEENT: No earache or tinnitus  Denies hearing loss or visual disturbances  CARDIOVASCULAR: No chest pain or palpitations  RESPIRATORY: Denies any cough, hemoptysis, shortness of breath or dyspnea on exertion  GASTROINTESTINAL: As noted in the History of Present Illness  GENITOURINARY: No problems with urination  Denies any hematuria or dysuria  NEUROLOGIC: No dizziness or vertigo, denies headaches  MUSCULOSKELETAL: Denies any muscle or joint pain  SKIN: Denies skin rashes or itching  ENDOCRINE: Denies excessive thirst  Denies intolerance to heat or cold  PSYCHOSOCIAL: Denies depression or anxiety  Denies any recent memory loss         Historical Information   Past Medical History:   Diagnosis Date    Diabetes mellitus ([facility] )     Diabetes mellitus ([facility] )     other type with circulatory complication, without long-term current    Hearing loss     r ear only    Obesity     Onychomycosis of multiple toenails with type 2 diabetes mellitus ([facility] )     last assessed: 3/21/2017     Past Surgical History:   Procedure Laterality Date    MOUTH SURGERY  12/28/2017    TONSILLECTOMY AND ADENOIDECTOMY       Social History   History   Alcohol Use    Yes     Comment: 1x weekly; social alcohol use (as per allscripts)     History   Drug Use No     History   Smoking Status    Never Smoker   Smokeless Tobacco    Never Used     Family History Problem Relation Age of Onset    Diabetes Mother     Stomach cancer Other        Meds/Allergies     Prescriptions Prior to Admission   Medication    metFORMIN (GLUCOPHAGE) 500 mg tablet     Current Facility-Administered Medications   Medication Dose Route Frequency    acetaminophen (TYLENOL) tablet 650 mg  650 mg Oral Q6H PRN    heparin (porcine) subcutaneous injection 5,000 Units  5,000 Units Subcutaneous Q8H [address]    ondansetron (ZOFRAN) injection 4 mg  4 mg Intravenous Q6H PRN    oxyCODONE (ROXICODONE) immediate release tablet 10 mg  10 mg Oral Q4H PRN    oxyCODONE (ROXICODONE) IR tablet 5 mg  5 mg Oral Q4H PRN    sodium chloride 0 9 % infusion  125 mL/hr Intravenous Continuous       Allergies   Allergen Reactions    Menthol Rash           Objective     Blood pressure 135/58, pulse 61, temperature 98 6 °F (37 °C), temperature source Oral, resp  rate 20, height 6' 1" (1 854 m), weight (!) 164 kg (361 lb 15 9 oz), SpO2 96 %  Intake/Output Summary (Last 24 hours) at 07/08/18 0932  Last data filed at 07/08/18 0900   Gross per 24 hour   Intake                0 ml   Output              900 ml   Net             -900 ml         PHYSICAL EXAM:      General Appearance:   Alert, cooperative, no distress, appears stated age; morbidly obese    HEENT:   Normocephalic, atraumatic, anicteric, no oropharyngeal thrush present      Neck:  Supple, symmetrical, trachea midline, no adenopathy;    thyroid: no enlargement/tenderness/nodules; no carotid  bruit or JVD    Lungs:   Clear to auscultation bilaterally; no rales, rhonchi or wheezing; respirations unlabored    Heart[de-identified]   S1 and S2 normal; regular rate and rhythm; no murmur, rub, or gallop     Abdomen:   Soft, morbidly obese, non-tender, non-distended; normal bowel sounds; no masses, no organomegaly    Genitalia:   Deferred    Rectal:   Deferred    Extremities:  No cyanosis, clubbing or edema    Pulses:  2+ and symmetric all extremities    Skin:  Skin color, texture, turgor normal, no rashes or lesions    Lymph nodes:  No palpable cervical, axillary or inguinal lymphadenopathy        Lab Results:     Results from last 7 days  Lab Units 07/08/18  0617   WBC Thousand/uL 9 62   HEMOGLOBIN g/dL 15 9   HEMATOCRIT % 50 5*   PLATELETS Thousands/uL 284   NEUTROS PCT % 67   LYMPHS PCT % 22   MONOS PCT % 8   EOS PCT % 3       Results from last 7 days  Lab Units 07/08/18  0617   SODIUM mmol/L 138   POTASSIUM mmol/L 3 7   CHLORIDE mmol/L 105   CO2 mmol/L 31   BUN mg/dL 11   CREATININE mg/dL 0 83   CALCIUM mg/dL 8 4   TOTAL PROTEIN g/dL 7 3   BILIRUBIN TOTAL mg/dL 1 79*   ALK PHOS U/L 122*   ALT U/L 383*   AST U/L 267*   GLUCOSE RANDOM mg/dL 102           Results from last 7 days  Lab Units 07/07/18  2056   LIPASE u/L 86       Imaging Studies: I have personally reviewed pertinent imaging studies  Us Right Upper Quadrant    Result Date: 7/7/2018  Impression: 1  Cholelithiasis without sonographic evidence of cholecystitis  2   The common bile duct is prominent in size measuring 8 mm  No evidence of proximal choledocholithiasis  3   Echogenic focus within the gallbladder fundus which may represent adenomyomatosis  Workstation performed: [id number]           Patient was seen and examined by Dr Lidia Alvarado  All smith medical decisions were made by Dr Lidia Alvarado  Thank you for allowing us to participate in the care of this present patient  We will follow-up with you closely

## 2018-07-08 NOTE — ED ATTENDING ATTESTATION
Safia Azar MD, saw and evaluated the patient  I have discussed the patient with the resident/non-physician practitioner and agree with the resident's/non-physician practitioner's findings, Plan of Care, and MDM as documented in the resident's/non-physician practitioner's note, except where noted  All available labs and Radiology studies were reviewed  At this point I agree with the current assessment done in the Emergency Department  I have conducted an independent evaluation of this patient a history and physical is as follows:      Critical Care Time  CritCare Time    Procedures     Patient with reported history of gall bladder pain  History of diabetes and obesity  Now with epigastric non radiating bloating pain  No chest pain or sob  No f/c/s  MDM- patient with likely gall bladder related symptoms  Will discuss with surgery  PER PRIOR NOTES---------------  HPI: Krystle Masters a 41 y  o  male who presents with right upper quadrant/epigastric pain that started last evening  Medical history is only significant for obesity and diabetes  The pain started 30 mins after dinner as bilateral upper abdominal pain  This pain then moved into the epigastrum  He has never had pain like this before  There was some nausea when the pain first came on  Denies fever or chills  Pepto bismal at home reduced the severity of the pain  Received dilaudid here which helped but then pain drastically reduced after passing gas  CT was done showing cholelithiasis without evidence of cholecystitis       Procedures Performed: No orders of the defined types were placed in this encounter         Hospital Course: A 41 y o  Presents with right upper quadrant pain likely secondary to symptomatic cholelithiasis based on CT scan findings  Patient was admitted for observation and an ultrasound was obtained because of mild elevations of his LFTs   The CT scan demonstrated cholelithiasis without CT evidence for cholecysititis  The Ultrasound demonstrated cholelithiasis with no gallbladder wall thickening and a questionable trace focus of pericholecystic fluid  Patient's pain resolved shortly after admission and patient was started on a diet  His repeat LFTs trended downwards  On the day of discharge, patient denied any abdominal pain was tolerated a diet and stable for discharge home  Patient understands he is to return to clinic for follow up for laparoscopic cholecystectomy  Patient also understands that he may redevelop symptoms of biliary colic and could develop an acute inflammation of the gallbladder upon discharge  Patient is to return to clinic after obtaining another set of LFTs to make sure LFTs normalize  ULTRASOUND 4/19/18  Cholelithiasis with a questionable trace focus of pericholecystic fluid  However, there is no gallbladder wall thickening  No sonographic Vaughn's sign was elicited during the exam   Correlation with laboratory studies is recommended    If symptoms   persist, a hepatobiliary scan could be performed for further evaluation      ----------------------------------------------

## 2018-07-09 ENCOUNTER — TRANSITIONAL CARE MANAGEMENT (OUTPATIENT)
Dept: INTERNAL MEDICINE CLINIC | Facility: CLINIC | Age: 38
End: 2018-07-09

## 2018-07-09 ENCOUNTER — TELEPHONE (OUTPATIENT)
Dept: INTERNAL MEDICINE CLINIC | Facility: CLINIC | Age: 38
End: 2018-07-09

## 2018-07-09 DIAGNOSIS — N28.89 LEFT RENAL MASS: Primary | ICD-10-CM

## 2018-07-09 NOTE — CASE MANAGEMENT
Notification of Inpatient Admission/Inpatient Authorization Request  This is a Notification of Inpatient Admission/Request for Inpatient Authorization to our facility [facility]  Please be advised that this patient is currently in our facility under Inpatient Status  Below you will find the Attending Physician and Facilitys information including NPI# and contact information for the Utilization  assigned to the [facility] where the patient is receiving services  Please feel free to contact the Utilization Review Department with any questions  Patient Information:  PATIENT NAME: Saloni Camarena  MRN: [id number]  YOB: 1980    PRESENTATION DATE: 7/7/2018  9:09 PM  IP ADMISSION DATE: 7/8/18 0118  DISCHARGE DATE: 7/8/2018  8:46 PM   DISPOSITION: Home/Self Care    Attending Physician:  KACY Escobar  Specialty- Trauma Surgery,   [facility] ID- [id number]  [facility] [address]  Phone 1: [phone]  Fax: [phone]  Facility:  [address] [place] [address] [address], [address] [place] [address] [phone]  NPI: [id number]  TAX ID# [id number]  MEDICARE ID: [id number]    [facility] in the [facility] by Kalyan Thayer for 2017  Network Utilization Review Department  Phone: [phone]; Fax [phone]  ATTENTION: The Network Utilization Review Department is now centralized for our 7 Facilities  Make a note that we have a new phone and fax numbers for our Department  Please call with any questions or concerns to [phone] and carefully follow the prompts so that you are directed to the right person  All voicemails are confidential  Fax any determinations, approvals, denials, and requests for initial or continue stay review clinical to [phone]   Due to HIGH CALL volume, it would be easier if you could please send faxed requests to expedite your requests and in part, help us provide discharge notifications faster

## 2018-07-09 NOTE — DISCHARGE INSTRUCTIONS
Call to the General Surgery office to schedule your appointment and eventually your outpatient surgery as soon as possible  Biliary Colic   WHAT YOU NEED TO KNOW:   What is biliary colic? Biliary colic is severe pain in your upper abdomen caused by a gallbladder problem  Your gallbladder stores bile, which helps break down the fats that you eat  What causes biliary colic? Biliary colic happens when something blocks the duct that moves bile out of the gallbladder  Any of the following can cause blockage:  · Gallstones    · Swelling of the gallbladder    · Narrow bile duct    · Injury    · Pancreatitis (inflammation of pancreas)    · Duodenitis (inflammation of small intestine)    · Spasms in the esophagus  What increases my risk for biliary colic? · Family history of biliary colic    · Certain medicines, such as birth control    · Older age    · Pregnancy    · Obesity    · Medical conditions, such as liver cirrhosis or hemolytic disease  What are the signs and symptoms of biliary colic? · Pain in the middle of your upper abdomen, just below your breast bone    · Pain that is worse on your right side, just below your ribs    · Pain in your back, just under your shoulder blade    · Nausea and vomiting    · Pain after you eat a meal  How is biliary colic diagnosed? · Blood tests  check for what is causing your biliary colic  · Urine tests  check gallbladder function  · An x-ray  of your abdomen may help determine what is causing your biliary colic  · An ultrasound  uses sound waves to show pictures on a monitor  An ultrasound may be done to show blockages or other causes of your pain  · A CT scan , or CAT scan, is a type of x-ray that uses a computer takes pictures of your abdomen  The pictures may show a blockage or other causes of your pain  You may be given a dye before the pictures are taken to help healthcare providers see the pictures better   Tell the healthcare provider if you have ever had an allergic reaction to contrast dye  · An MRI  is a scan that uses powerful magnets and a computer to take pictures of your biliary system  An MRI may show blockages or growths  You may be given dye to help the pictures show up better  Tell the healthcare provider if you have ever had an allergic reaction to contrast dye  Do not enter the MRI room with anything metal  Metal can cause serious injury  Tell the healthcare provider if you have any metal in or on your body  How is biliary colic treated? · Medicines  can help decrease pain and muscle spasms  You may also need medicine to calm your stomach and stop vomiting  · Procedures  may be needed to remove blockages or widen your bile duct  Ask your healthcare provider for more information about any procedures you may need  How can I manage my symptoms? · Avoid alcohol  Alcohol can damage your gallbladder and make your symptoms worse  · Maintain a healthy weight  Ask your healthcare provider how much you should weigh  Ask him to help you create a weight loss plan if you are overweight  · Eat a variety of healthy foods  Healthy foods include fruits, vegetables, whole-grain breads, low-fat dairy products, beans, lean meats, and fish  Foods that are high in fiber and low in fat and cholesterol may decrease your symptoms  Ask if you need to be on a special diet  · Exercise  Ask your healthcare provider about the best exercise plan for you  Exercise may help improve your symptoms  When should I contact my healthcare provider? · You have a fever  · Your pain gets worse, even after you take medicine  · You have nausea or are vomiting  · Your skin or eyes are yellow  · You have questions or concerns about your condition or care  When should I seek immediate care or call 911? · You have severe pain  · You feel like you are going to faint  · You are short of breath    CARE AGREEMENT:   You have the right to help plan your care  Learn about your health condition and how it may be treated  Discuss treatment options with your caregivers to decide what care you want to receive  You always have the right to refuse treatment  The above information is an  only  It is not intended as medical advice for individual conditions or treatments  Talk to your doctor, nurse or pharmacist before following any medical regimen to see if it is safe and effective for you  © 2017 [employer]  Information is for End User's use only and may not be sold, redistributed or otherwise used for commercial purposes  All illustrations and images included in CareNotes® are the copyrighted property of A [employer] M [employer]  or Kalyan Thayer

## 2018-07-09 NOTE — TELEPHONE ENCOUNTER
Patient was admitted to [facility] on 7/7/18 for abdominal pain,  cholelithiasis, and discharged on 7/8/18 with choledocholithiasis ruled out and diagnosed with Biliary cholic  He declines to schedule a TCM appt with [facility]  He will follow up with general surgery to schedule an elective cholecystectomy  No TCM scheduled    kz

## 2018-07-09 NOTE — DISCHARGE SUMMARY
Discharge Summary - Chio Jovel 41 y o  male MRN: [id number]    [facility]: [id number]    Admission Date:   Admission Orders     Ordered        07/08/18 0120  Inpatient Admission  Once               Admitting Diagnosis: Cholelithiasis [K80 20]  Choledocholithiasis [K80 50]  Abdominal pain [R10 9]    HPI: Chio Jovel is a 41 y o  male w/ PMH of DM and obesity who presents with epigastric and RUQ abdominal pain since noon today  Pt reports the pain as 9/10 abdominal "fullness," which primarily started in the epigastric region, but is now felt in the RUQ  Pain was not relieved by Pepto-Bismol or Tylenol  Denies any association of the pain with eating fatty foods  Does have a hx of GERD  Patient has had 2 prior episodes of pain similar to this  The 1st was in April of this year, requiring an ED visit  Right upper quadrant ultrasound in April showed cholelithiasis with a questionable trace of pericholecystic fluid and a common bile duct measuring 6 millimeters  The 2nd episode occurred in June, and patient reports the pain resolved without a visit to the hospital         Procedures Performed: None    Summary of Hospital Course: Patient had a right upper quadrant ultrasound shows cholelithiasis without sonographic evidence of cholecystitis with a common bile duct measuring 8 millimeters  Subsequent MRCP revealed stones in the gall bladder, but no stones in the CBD which was large  Due to problems with missing work, the patient, who on the day of discharge was free of pain and nausea, elected to return home and schedule an outpatient laparoscopic cholecystectomy whenn it would be more convenient for him  Discharge Diagnosis: Biliary Cholic    Condition at Discharge: good       Discharge instructions/Information to patient and family:   See after visit summary for information provided to patient and family        Provisions for Follow-Up Care:  See after visit summary for information related to follow-up care and any pertinent home health orders  PCP: Beck Guajardo MD    Disposition: Home    Planned Readmission: No    Discharge Statement   I spent 20 minutes discharging the patient  This time was spent on the day of discharge  I had direct contact with the patient on the day of discharge  Additional documentation is required if more than 30 minutes were spent on discharge  Discharge Medications:  See after visit summary for reconciled discharge medications provided to patient and family

## 2018-07-09 NOTE — TELEPHONE ENCOUNTER
Radiology called  States that MRCP completed inpatient shows L renal mass  He is unable to provide more information without MRI with contrast   Will need OP

## 2018-07-09 NOTE — CASE MANAGEMENT
Initial Clinical Review    Thank you,  Jada Aqq  [facility] Utilization Review Department  Phone: [phone]; Fax [phone]  ATTENTION: The Network Utilization Review Department is now centralized for our 9 Facilities  Make a note that we have a new phone and fax numbers for our Department  Please call with any questions or concerns to [phone] and carefully follow the prompts so that you are directed to the right person  All voicemails are confidential  Fax any determinations, approvals, denials, and requests for initial or continue stay review clinical to [phone]  Due to HIGH CALL volume, it would be easier if you could please send faxed requests to expedite your requests and in part, help us provide discharge notifications faster  Admission: Date/Time/Statement: 7/8/18 @ 0118     Orders Placed This Encounter   Procedures    Inpatient Admission     Standing Status:   Standing     Number of Occurrences:   1     Order Specific Question:   Admitting Physician     Answer:   Sharla Snowden [[id number]]     Order Specific Question:   Level of Care     Answer:   Med Surg [[id number]]     Order Specific Question:   Estimated length of stay     Answer:   More than 2 Midnights     Order Specific Question:   Certification     Answer:   I certify that inpatient services are medically necessary for this patient for a duration of greater than two midnights  See H&P and MD Progress Notes for additional information about the patient's course of treatment         ED: Date/Time/Mode of Arrival:   ED Arrival Information     Expected Arrival Acuity Means of Arrival Escorted By Service Admission Type    - 7/7/2018 20:36 Urgent [facility] Surgery-General Urgent    Arrival Complaint    mid section pain, pt  thinks it his gall bladder          Chief Complaint:   Chief Complaint   Patient presents with    Abdominal Pain     mid epigastric abdominal pain " i think its my gallbladder", here 4/18 for same, no fever,  no N/V/D       History of Illness:  41 y o  male w/ PMH of DM and obesity who presents with epigastric and RUQ abdominal pain since noon today  Pt reports the pain as 9/10 abdominal "fullness," which primarily started in the epigastric region, but is now felt in the RUQ  Pain was not relieved by Pepto-Bismol or Tylenol  Denies any association of the pain with eating fatty foods  Does have a hx of GERD  Patient has had 2 prior episodes of pain similar to this  The 1st was in April of this year, requiring an ED visit  Right upper quadrant ultrasound in April showed cholelithiasis with a questionable trace of pericholecystic fluid and a common bile duct measuring 6 millimeters  The 2nd episode occurred in June, and patient reports the pain resolved without a visit to the hospital   Today's right upper quadrant ultrasound shows cholelithiasis without sonographic evidence of cholecystitis with a common bile duct measuring 8 millimeters  An echogenic focus was found within the fundus which may represent adenomymatosis         ED Vital Signs:   ED Triage Vitals   Temperature Pulse Respirations Blood Pressure SpO2   07/07/18 2050 07/07/18 2050 07/07/18 2050 07/07/18 2050 07/07/18 2050   (!) 97 4 °F (36 3 °C) (!) 54 18 141/84 95 %      Temp Source Heart Rate Source Patient Position - Orthostatic VS BP Location FiO2 (%)   07/07/18 2050 07/07/18 2050 07/07/18 2050 07/07/18 2050 --   Oral Monitor Sitting Right arm       Pain Score       07/07/18 2137       9        Wt Readings from Last 1 Encounters:   07/08/18 (!) 164 kg (361 lb 15 9 oz)       Vital Signs (abnormal): 97 4-58-/58, Pox 96% RA    Abnormal Labs/Diagnostic Test Results: WBC 12 58, , , Alk phos 117, Tl bili 1 61    US RUQ -- 1  Cholelithiasis without sonographic evidence of cholecystitis  2   The common bile duct is prominent in size measuring 8 mm  No evidence of proximal choledocholithiasis   3   Echogenic focus within the gallbladder fundus which may represent adenomyomatosis    MRCP --1  Numerous small gallstones in the gallbladder  There is no evidence of choledocholithiasis or biliary ductal dilatation     2  There is an indeterminant 1 8 cm exophytic left kidney lower pole lesion for which characterization with abdomen MR with and without contrast is recommended (Series 4 image 7 )      ED Treatment:   Medication Administration from 07/07/2018 2036 to 07/08/2018 0141       Date/Time Order Dose Route Action Action by Comments     07/07/2018 2200 ketorolac (TORADOL) injection 15 mg 15 mg Intravenous Given Vane Portillo RN      07/07/2018 2204 acetaminophen (TYLENOL) tablet 975 mg 975 mg Oral Given Vane Portillo RN           Past Medical/Surgical History:    Active Ambulatory Problems     Diagnosis Date Noted    [facility] 03/03/2017    Morbid obesity [facility] ) 03/03/2017    Acute right-sided low back pain with sciatica and paresthesia 03/03/2017    Bone marrow disorder 01/03/2018    Central stenosis of spinal canal 04/13/2017    Elevated blood pressure reading 03/02/2017    Elevated hemoglobin (HCC) 03/10/2017    Foraminal stenosis of lumbosacral region 03/21/2017    Intertrigo 09/14/2017    Lumbar radiculitis 04/13/2017    Lumbar spinal stenosis 03/21/2017    Positive depression screening 03/21/2017    Weakness of left lower extremity 08/10/2017    Cholelithiasis with choledocholithiasis 04/19/2018     Past Medical History:   Diagnosis Date    Diabetes mellitus ([facility] )     Diabetes mellitus ([facility] )     Hearing loss     Obesity     Onychomycosis of multiple toenails with type 2 diabetes mellitus ([facility] )        Admitting Diagnosis: Cholelithiasis [K80 20]  Choledocholithiasis [K80 50]  Abdominal pain [R10 9]    Age/Sex: 41 y o  male    Assessment/Plan:   Assessment:  42 y/o M w/ epigastric, RUQ abdominal pain, along with CBD measuring 8mm on U/S, elevated LFTs, and elevated WBC count  --r/o Acute Choledocholithiasis     Plan:  --Admit to [facility] Surgery service  --c/s GI for ERCP  --Trend LFTs  --Lap isauro this admission  --Afebrile, not jaundiced, mental status intact-- less concerning for acute cholangitis  --Monitor VS      Admission Orders:  M/S unit  Npo  Consult GI  MRCP  Monitor I&O's  Hep sq q8h  NSS @ 125 ml/hr      GI consult --   ASSESSMENT and PLAN:    Principal Problem:    Cholelithiasis with choledocholithiasis  Active Problems:    Type 2 diabetes mellitus ([facility] )    [facility]     #1  Epigastric pain with elevated LFTs and common bile duct dilation:  Ultrasound showing the common bile duct is 8 mm but no obvious choledocholithiasis  Patient's abdominal pain has improved  LFTs are elevated with a T bili of 1 79, , , and alk-phos of 122 which have all worsened since yesterday  No elevated white blood cell count at this time  Afebrile  Differential includes common bile duct obstruction versus passed common bile duct stone  -will obtain an MRCP for further evaluation to determine the need for ERCP if there is a common bile duct obstruction currently  -clear liquids today  -continue to monitor LFTs closely  -surgical follow-up  Patient will ultimately need his gallbladder removed regardless of need for ERCP          D/C summary ---  Summary of Hospital Course: Patient had a right upper quadrant ultrasound shows cholelithiasis without sonographic evidence of cholecystitis with a common bile duct measuring 8 millimeters  Subsequent MRCP revealed stones in the gall bladder, but no stones in the CBD which was large   Due to problems with missing work, the patient, who on the day of discharge was free of pain and nausea, elected to return home and schedule an outpatient laparoscopic cholecystectomy whenn it would be more convenient for him

## 2018-07-12 PROBLEM — N28.89 LEFT RENAL MASS: Status: ACTIVE | Noted: 2018-07-12

## 2018-07-12 NOTE — TELEPHONE ENCOUNTER
Pt called  He was not aware of L renal mass  Informed  Informed he needed a OP MRI  He was not happy  He was transferred to front to schedule appt to follow-up on hospitalization  MRI ordered

## 2018-07-19 ENCOUNTER — OFFICE VISIT (OUTPATIENT)
Dept: INTERNAL MEDICINE CLINIC | Facility: CLINIC | Age: 38
End: 2018-07-19
Payer: COMMERCIAL

## 2018-07-19 VITALS
BODY MASS INDEX: 41.75 KG/M2 | HEART RATE: 81 BPM | SYSTOLIC BLOOD PRESSURE: 120 MMHG | WEIGHT: 315 LBS | OXYGEN SATURATION: 96 % | RESPIRATION RATE: 20 BRPM | DIASTOLIC BLOOD PRESSURE: 86 MMHG | TEMPERATURE: 97.6 F | HEIGHT: 73 IN

## 2018-07-19 DIAGNOSIS — N28.89 LEFT RENAL MASS: Primary | ICD-10-CM

## 2018-07-19 DIAGNOSIS — K80.70 CHOLELITHIASIS WITH CHOLEDOCHOLITHIASIS: ICD-10-CM

## 2018-07-19 DIAGNOSIS — E11.8 TYPE 2 DIABETES MELLITUS WITH COMPLICATION, UNSPECIFIED WHETHER LONG TERM INSULIN USE: ICD-10-CM

## 2018-07-19 DIAGNOSIS — R21 RASH: ICD-10-CM

## 2018-07-19 PROCEDURE — 1111F DSCHRG MED/CURRENT MED MERGE: CPT | Performed by: NURSE PRACTITIONER

## 2018-07-19 PROCEDURE — 99214 OFFICE O/P EST MOD 30 MIN: CPT | Performed by: NURSE PRACTITIONER

## 2018-07-19 RX ORDER — TRIAMCINOLONE ACETONIDE 1 MG/G
CREAM TOPICAL 2 TIMES DAILY
Qty: 45 G | Refills: 1 | Status: SHIPPED | OUTPATIENT
Start: 2018-07-19 | End: 2019-03-21

## 2018-07-19 NOTE — PROGRESS NOTES
Assessment/Plan:    Patient presents for hospital follow-up and to review MRI results completed in the hospital   Patient was admitted for right upper quadrant abdominal pain  Ultrasound showed cholelithiasis  Follow-up MRCP showed stones in the gallbladder but no stones in the common bile duct  He is due to follow up with General surgery outpatient for a laparoscopic cholecystectomy  Patient does not have a follow-up appointment scheduled  He is aware he needs to schedule follow-up appointment  He is currently pain-free  Denies fever, chills, nausea, vomiting, changes in bowel habits  Incidentally during his hospitalization the MRCP showed a left liver mass  Patient is scheduled to follow up with an MRI with contrast for further evaluation  Patient has this appointment scheduled on August 10th  Regarding the rash, appears to be contact related  Will restart triamcinolone cream   Patient to follow up if no improvement    Patient is overdue for his routine blood work and evaluation on his diabetes  Slips written, patient follow up in one month reviewed labs  Diagnoses and all orders for this visit:    Left renal mass    Type 2 diabetes mellitus with complication, unspecified whether long term insulin use (HCC)  -     metFORMIN (GLUCOPHAGE) 500 mg tablet; Take 1 tablet (500 mg total) by mouth 2 (two) times a day with meals  -     CBC and differential; Future  -     Comprehensive metabolic panel; Future  -     Lipid panel; Future  -     Hemoglobin A1C; Future  -     TSH, 3rd generation with Free T4 reflex; Future    Cholelithiasis with choledocholithiasis    Rash  -     triamcinolone (KENALOG) 0 1 % cream; Apply topically 2 (two) times a day        Subjective:      Patient ID: Bárbara Carrillo is a 41 y o  male  HPI    Patient presents for hospital follow-up and review MRI results completed in the hospital   Patient was admitted to the hospital for right upper quadrant abdominal pain    His right upper quadrant ultrasound showed cholelithiasis without sonographic evidence cholecystitis with a common bile duct measuring 8 mm  He had a follow-up MRCP which showed stones in the gallbladder but no stones in the common bile duct which was large  Patient elected to be discharged and follow up outpatient for laparoscopic cholecystectomy when it would be convenient for him due to his work schedule  Patient does not have a follow-up scheduled with General surgery regarding laparoscopic cholecystectomy  Patient is concerned in hesitant to have removal as he is pain-free at this time  Patient denies abdominal pain, nausea, vomiting, fever, chills  During hospitalization radiology noted a left liver mass on the MRCP however due to the lack of contrast was and able to get further information  This was discussed with patient by myself and telephone phone call previously informing of the liver lesion in the need for a follow-up MRI with contrast   Patient has the appointment scheduled on August 10th  Patient states it is delayed due to his personal schedule  Patient denies flank pain, dysuria, hematuria, increased urinary frequency, difficulty urinating  Additionally patient complains of a rash to his right-sided abdomen and right posterior thigh and popliteal area  Patient reports he has had this rash for approximately one month  Unknown cause  But he feels he may have came in contact with poison  Denies aggravating factors  He is not using anything currently for the rash  Does complain of pruritus  Patient does have a past medical history of diabetes mellitus, elevated blood pressure without diagnosis of hypertension  He has not had routine blood work completed in almost six months    He has not had a routine follow-up in some time either    The following portions of the patient's history were reviewed and updated as appropriate: allergies, current medications, past family history, past medical history, past social history, past surgical history and problem list     Review of Systems   Constitutional: Negative for chills, fatigue and fever  Respiratory: Negative for cough, chest tightness, shortness of breath and wheezing  Cardiovascular: Negative for chest pain, palpitations and leg swelling  Gastrointestinal: Negative for abdominal pain, constipation, diarrhea, nausea and vomiting  Genitourinary: Negative for difficulty urinating, dysuria, frequency, hematuria and urgency  Skin: Positive for rash  Neurological: Negative for dizziness and light-headedness  Past Medical History:   Diagnosis Date    Diabetes mellitus ([place] [address] )     Diabetes mellitus ([place] [address] )     other type with circulatory complication, without long-term current    Hearing loss     r ear only    Obesity     Onychomycosis of multiple toenails with type 2 diabetes mellitus ([place] [address] )     last assessed: 3/21/2017         Current Outpatient Prescriptions:     metFORMIN (GLUCOPHAGE) 500 mg tablet, Take 1 tablet (500 mg total) by mouth 2 (two) times a day with meals, Disp: 60 tablet, Rfl: 2    Allergies   Allergen Reactions    Menthol Rash       Social History   Past Surgical History:   Procedure Laterality Date    MOUTH SURGERY  12/28/2017    TONSILLECTOMY AND ADENOIDECTOMY       Family History   Problem Relation Age of Onset    Diabetes Mother     Stomach cancer Other        Objective:  /86 (BP Location: Left arm, Patient Position: Sitting, Cuff Size: Large)   Pulse 81   Temp 97 6 °F (36 4 °C) (Oral)   Resp 20   Ht 6' 1" (1 854 m)   Wt (!) 162 kg (357 lb 3 2 oz)   SpO2 96% Comment: room air  BMI 47 13 kg/m²      Physical Exam   Constitutional: He is oriented to person, place, and time  He appears well-developed and well-nourished  No distress  obese   Neck: Neck supple  Cardiovascular: Normal rate, regular rhythm and normal heart sounds  No murmur heard    Pulmonary/Chest: Effort normal and breath sounds normal  No respiratory distress  He has no wheezes  Abdominal: Soft  Bowel sounds are normal  There is no tenderness  Obese   Genitourinary:   Genitourinary Comments: No CVA tenderness   Neurological: He is alert and oriented to person, place, and time  No focal deficits   Skin: Skin is warm and dry  Rash noted  No purpura noted  Rash is maculopapular (R side abd, posterior R thigh and popiteal)  Rash is not pustular and not vesicular  Psychiatric: He has a normal mood and affect  His behavior is normal  Judgment and thought content normal    Nursing note and vitals reviewed

## 2018-07-19 NOTE — PATIENT INSTRUCTIONS
Follow-up with MRI  Follow-up with General Surgery    You need to update you labs  No medication changes    Use cream for rash  If no improvement will update  Pt to return for follow-up in 1 month to review labs

## 2018-08-16 ENCOUNTER — CLINICAL SUPPORT (OUTPATIENT)
Dept: INTERNAL MEDICINE CLINIC | Facility: CLINIC | Age: 38
End: 2018-08-16
Payer: COMMERCIAL

## 2018-08-16 DIAGNOSIS — E11.8 TYPE 2 DIABETES MELLITUS WITH COMPLICATION, UNSPECIFIED WHETHER LONG TERM INSULIN USE: Primary | ICD-10-CM

## 2018-08-16 PROCEDURE — 36415 COLL VENOUS BLD VENIPUNCTURE: CPT

## 2018-08-17 LAB
ALBUMIN SERPL-MCNC: 4 G/DL (ref 3.5–5.5)
ALBUMIN/GLOB SERPL: 1.4 {RATIO} (ref 1.2–2.2)
ALP SERPL-CCNC: 91 IU/L (ref 39–117)
ALT SERPL-CCNC: 47 IU/L (ref 0–44)
AST SERPL-CCNC: 26 IU/L (ref 0–40)
BASOPHILS # BLD AUTO: 0 X10E3/UL (ref 0–0.2)
BASOPHILS NFR BLD AUTO: 0 %
BILIRUB SERPL-MCNC: 0.4 MG/DL (ref 0–1.2)
BUN SERPL-MCNC: 10 MG/DL (ref 6–20)
BUN/CREAT SERPL: 14 (ref 9–20)
CALCIUM SERPL-MCNC: 9.1 MG/DL (ref 8.7–10.2)
CHLORIDE SERPL-SCNC: 99 MMOL/L (ref 96–106)
CHOLEST SERPL-MCNC: 156 MG/DL (ref 100–199)
CHOLEST/HDLC SERPL: 4 RATIO (ref 0–5)
CO2 SERPL-SCNC: 31 MMOL/L (ref 20–29)
CREAT SERPL-MCNC: 0.74 MG/DL (ref 0.76–1.27)
EOSINOPHIL # BLD AUTO: 0.5 X10E3/UL (ref 0–0.4)
EOSINOPHIL NFR BLD AUTO: 5 %
ERYTHROCYTE [DISTWIDTH] IN BLOOD BY AUTOMATED COUNT: 13.6 % (ref 12.3–15.4)
EST. AVERAGE GLUCOSE BLD GHB EST-MCNC: 105 MG/DL
GLOBULIN SER-MCNC: 2.9 G/DL (ref 1.5–4.5)
GLUCOSE SERPL-MCNC: 95 MG/DL (ref 65–99)
HBA1C MFR BLD: 5.3 % (ref 4.8–5.6)
HCT VFR BLD AUTO: 45.8 % (ref 37.5–51)
HDLC SERPL-MCNC: 39 MG/DL
HGB BLD-MCNC: 15.3 G/DL (ref 13–17.7)
IMM GRANULOCYTES # BLD: 0 X10E3/UL (ref 0–0.1)
IMM GRANULOCYTES NFR BLD: 0 %
LDLC SERPL CALC-MCNC: 102 MG/DL (ref 0–99)
LYMPHOCYTES # BLD AUTO: 1.6 X10E3/UL (ref 0.7–3.1)
LYMPHOCYTES NFR BLD AUTO: 16 %
MCH RBC QN AUTO: 30.7 PG (ref 26.6–33)
MCHC RBC AUTO-ENTMCNC: 33.4 G/DL (ref 31.5–35.7)
MCV RBC AUTO: 92 FL (ref 79–97)
MONOCYTES # BLD AUTO: 0.5 X10E3/UL (ref 0.1–0.9)
MONOCYTES NFR BLD AUTO: 5 %
NEUTROPHILS # BLD AUTO: 7.4 X10E3/UL (ref 1.4–7)
NEUTROPHILS NFR BLD AUTO: 74 %
PLATELET # BLD AUTO: 269 X10E3/UL (ref 150–379)
POTASSIUM SERPL-SCNC: 4.5 MMOL/L (ref 3.5–5.2)
PROT SERPL-MCNC: 6.9 G/DL (ref 6–8.5)
RBC # BLD AUTO: 4.99 X10E6/UL (ref 4.14–5.8)
SL AMB EGFR AFRICAN AMERICAN: 137 ML/MIN/1.73
SL AMB EGFR NON AFRICAN AMERICAN: 119 ML/MIN/1.73
SL AMB VLDL CHOLESTEROL CALC: 15 MG/DL (ref 5–40)
SODIUM SERPL-SCNC: 144 MMOL/L (ref 134–144)
TRIGL SERPL-MCNC: 75 MG/DL (ref 0–149)
TSH SERPL DL<=0.005 MIU/L-ACNC: 1.47 UIU/ML (ref 0.45–4.5)
WBC # BLD AUTO: 10 X10E3/UL (ref 3.4–10.8)

## 2018-08-24 ENCOUNTER — OFFICE VISIT (OUTPATIENT)
Dept: INTERNAL MEDICINE CLINIC | Age: 38
End: 2018-08-24
Payer: COMMERCIAL

## 2018-08-24 VITALS
TEMPERATURE: 97.3 F | SYSTOLIC BLOOD PRESSURE: 132 MMHG | DIASTOLIC BLOOD PRESSURE: 76 MMHG | HEIGHT: 72 IN | OXYGEN SATURATION: 98 % | BODY MASS INDEX: 42.66 KG/M2 | WEIGHT: 315 LBS | HEART RATE: 78 BPM

## 2018-08-24 DIAGNOSIS — N28.89 LEFT RENAL MASS: ICD-10-CM

## 2018-08-24 DIAGNOSIS — E11.9 TYPE 2 DIABETES MELLITUS WITHOUT COMPLICATION, WITHOUT LONG-TERM CURRENT USE OF INSULIN (HCC): Primary | Chronic | ICD-10-CM

## 2018-08-24 DIAGNOSIS — K80.70 CHOLELITHIASIS WITH CHOLEDOCHOLITHIASIS: ICD-10-CM

## 2018-08-24 PROCEDURE — 3008F BODY MASS INDEX DOCD: CPT | Performed by: NURSE PRACTITIONER

## 2018-08-24 PROCEDURE — 4010F ACE/ARB THERAPY RXD/TAKEN: CPT | Performed by: NURSE PRACTITIONER

## 2018-08-24 PROCEDURE — 99214 OFFICE O/P EST MOD 30 MIN: CPT | Performed by: NURSE PRACTITIONER

## 2018-08-24 RX ORDER — LISINOPRIL 10 MG/1
10 TABLET ORAL DAILY
Qty: 90 TABLET | Refills: 1 | Status: SHIPPED | OUTPATIENT
Start: 2018-08-24 | End: 2018-12-19

## 2018-08-24 NOTE — PROGRESS NOTES
Assessment/Plan:    Diabetes:  Continue your metformin  Will start lisinopril for renal protectiveness  A1c 5 3  Per recommendation of AACE guidelines your HgbA1c is recommended to be < 6 5  Continue to work on diet and exercise  Limit sugars and carbohydrate intake  Avoid soda, juice, sweets - cookies, desserts, pasta, bread  Eat more whole grain  Exercise 30 mins of cardio at least 3 times a week  Recommend daily foot exam to check for sores  Recommend yearly eye exams  Obesity:  D/W pt to consider weight loss surgery  He is not interested in at this time  But he is going to continue with diet, exercise  He is in the process of getting a   Renal Mass:  Pt did not complete his MRI  He will reschedule  Cholelithiasis:  Pt has not followed up with General Surgery for removal   He has not had a flare up and wants to wait until he returns from a cruise in September  Abdominal Rash:  Recommend antibacterial bodywash such as dial as the rash to you abdomen is likely contributed from heat and sweat     Diagnoses and all orders for this visit:    Type 2 diabetes mellitus without complication, without long-term current use of insulin (HCC)  -     lisinopril (ZESTRIL) 10 mg tablet; Take 1 tablet (10 mg total) by mouth daily  -     CBC and differential; Future  -     Comprehensive metabolic panel; Future  -     Hemoglobin A1C; Future    Cholelithiasis with choledocholithiasis    Left renal mass    Other orders  -     Fexofenadine HCl (ALLEGRA PO); Take 1 tablet by mouth daily  -     DiphenhydrAMINE HCl (BENADRYL ALLERGY PO); Take by mouth as needed  -     Cetirizine HCl (ZYRTEC ALLERGY PO); Take by mouth as needed        Subjective:      Patient ID: Henrry Lancaster is a 41 y o  male  Diabetes   He presents for his follow-up diabetic visit  He has type 2 diabetes mellitus  His disease course has been stable  There are no hypoglycemic associated symptoms   Pertinent negatives for hypoglycemia include no dizziness or nervousness/anxiousness  Pertinent negatives for diabetes include no blurred vision, no chest pain, no fatigue, no foot paresthesias, no foot ulcerations, no visual change and no weight loss  Symptoms are stable  There are no diabetic complications  Risk factors for coronary artery disease include male sex, obesity, diabetes mellitus, hypertension and sedentary lifestyle  Current diabetic treatment includes oral agent (monotherapy)  He is compliant with treatment all of the time  An ACE inhibitor/angiotensin II receptor blocker is not being taken  He does not see a podiatrist Eye exam is not current  Renal Mass  Pt has not had his follow-up repeat MRI    Cholelithiasis  Pt has not followed up with General Surgery as previously discussed since his recently hospitalization    Obesity   Pt reports he is working on eating healthy and exercising  He is attempting to loose weight  Rash  Pt states that he has a rash to his abdomen that started a few weeks ago  There is pruritus associated  No ill contacts  No known irritants  No new soap, lotion, laundry detergent  Denies fever/chills    The following portions of the patient's history were reviewed and updated as appropriate: allergies, current medications, past family history, past medical history, past social history, past surgical history and problem list     Review of Systems   Constitutional: Negative for chills, fatigue, fever, unexpected weight change and weight loss  Eyes: Negative for blurred vision  Respiratory: Negative for cough, chest tightness, shortness of breath and wheezing  Cardiovascular: Negative for chest pain, palpitations and leg swelling  Gastrointestinal: Negative for abdominal pain, blood in stool, constipation, diarrhea, nausea and vomiting  Skin: Positive for rash  Neurological: Negative for dizziness and light-headedness  Psychiatric/Behavioral: Negative for dysphoric mood  The patient is not nervous/anxious  Past Medical History:   Diagnosis Date    [facility] ([facility] [address] )     [facility] mellitus ([facility] )     other type with circulatory complication, without long-term current    Hearing loss     r ear only    Obesity     Onychomycosis of multiple toenails with type 2 diabetes mellitus ([facility] [address] )     last assessed: 3/21/2017         Current Outpatient Prescriptions:     Cetirizine HCl (ZYRTEC ALLERGY PO), Take by mouth as needed, Disp: , Rfl:     DiphenhydrAMINE HCl (BENADRYL ALLERGY PO), Take by mouth as needed, Disp: , Rfl:     Fexofenadine HCl (ALLEGRA PO), Take 1 tablet by mouth daily, Disp: , Rfl:     metFORMIN (GLUCOPHAGE) 500 mg tablet, Take 1 tablet (500 mg total) by mouth 2 (two) times a day with meals, Disp: 180 tablet, Rfl: 1    triamcinolone (KENALOG) 0 1 % cream, Apply topically 2 (two) times a day (Patient not taking: Reported on 8/24/2018 ), Disp: 45 g, Rfl: 1    Allergies   Allergen Reactions    Menthol Rash       Social History   Past Surgical History:   Procedure Laterality Date    MOUTH SURGERY  12/28/2017    TONSILLECTOMY AND ADENOIDECTOMY       Family History   Problem Relation Age of Onset    Diabetes Mother     Stomach cancer Other      Objective:  /76 (BP Location: Left arm, Patient Position: Sitting, Cuff Size: Large)   Pulse 78   Temp (!) 97 3 °F (36 3 °C) (Tympanic)   Ht 6' 0 44" (1 84 m)   Wt (!) 165 kg (362 lb 12 8 oz)   SpO2 98%   BMI 48 61 kg/m²      Physical Exam   Constitutional: He is oriented to person, place, and time  He appears well-developed and well-nourished  No distress  obese   Neck: Neck supple  Cardiovascular: Normal rate, regular rhythm and normal heart sounds  No murmur heard  Pulmonary/Chest: Effort normal and breath sounds normal  No respiratory distress  He has no wheezes  Abdominal: Soft  Bowel sounds are normal  There is no tenderness     Obese   Neurological: He is alert and oriented to person, place, and time  No focal deficits   Skin: Skin is warm and dry  Rash (appears to by mild follicultiis to abdomen) noted  No purpura noted  Rash is not maculopapular, not pustular and not vesicular  Psychiatric: He has a normal mood and affect  His behavior is normal  Judgment and thought content normal    Nursing note and vitals reviewed

## 2018-08-24 NOTE — PATIENT INSTRUCTIONS
Will start lisinopril to protect your kidneys due to your diabetes    You are started on an ACE inhibitor today  As discussed the most common side effect of this medication is a dry consistent cough  This is resolved on own with stopping the medication after 3-4 days  Please contact our office if you develop this cough and we can change your prescription to another hypertensive medication  Additionally an uncommon side effect, but life threatening, is to develop a reaction called angioedema  This is when your face, mouth, and/or tongue begin to swell  As discussed this is a life threatening reaction and you should call 911 immediately  Diabetes:  Continue your metformin  Per recommendation of AACE guidelines your HgbA1c is recommended to be < 6 5  Continue to work on diet and exercise  Limit sugars and carbohydrate intake  Avoid soda, juice, sweets - cookies, desserts, pasta, bread  Eat more whole grain  Exercise 30 mins of cardio at least 3 times a week  Recommend daily foot exam to check for sores  Recommend yearly eye exams  Consider weight loss surgery  Continue with weight loss - healthy diet and exercise  You need follow-up MRI for renal mass as previously discussed  Follow-up with General Surgery for gallbladder removal as previously discussed      Would recommend antibacterial bodywash such as dial as the rash to you abdomen is likely contributed from heat and sweat

## 2018-09-29 ENCOUNTER — OFFICE VISIT (OUTPATIENT)
Dept: INTERNAL MEDICINE CLINIC | Facility: CLINIC | Age: 38
End: 2018-09-29
Payer: COMMERCIAL

## 2018-09-29 VITALS
BODY MASS INDEX: 48.96 KG/M2 | OXYGEN SATURATION: 96 % | HEART RATE: 54 BPM | WEIGHT: 315 LBS | SYSTOLIC BLOOD PRESSURE: 132 MMHG | DIASTOLIC BLOOD PRESSURE: 88 MMHG | TEMPERATURE: 97.9 F

## 2018-09-29 DIAGNOSIS — N52.8 OTHER MALE ERECTILE DYSFUNCTION: ICD-10-CM

## 2018-09-29 DIAGNOSIS — M54.50 ACUTE RIGHT-SIDED LOW BACK PAIN WITHOUT SCIATICA: Primary | ICD-10-CM

## 2018-09-29 PROCEDURE — 99213 OFFICE O/P EST LOW 20 MIN: CPT | Performed by: INTERNAL MEDICINE

## 2018-09-29 RX ORDER — CYCLOBENZAPRINE HCL 5 MG
TABLET ORAL
Qty: 30 TABLET | Refills: 0 | Status: SHIPPED | OUTPATIENT
Start: 2018-09-29 | End: 2019-03-21

## 2018-09-29 RX ORDER — IBUPROFEN 800 MG/1
800 TABLET ORAL EVERY 6 HOURS PRN
Qty: 30 TABLET | Refills: 0 | Status: SHIPPED | OUTPATIENT
Start: 2018-09-29 | End: 2019-03-21

## 2018-09-29 RX ORDER — TRAMADOL HYDROCHLORIDE 50 MG/1
50 TABLET ORAL EVERY 8 HOURS PRN
Qty: 30 TABLET | Refills: 0 | Status: SHIPPED | OUTPATIENT
Start: 2018-09-29 | End: 2019-03-21

## 2018-09-29 RX ORDER — SILDENAFIL 50 MG/1
50 TABLET, FILM COATED ORAL DAILY PRN
Qty: 10 TABLET | Refills: 0 | Status: SHIPPED | OUTPATIENT
Start: 2018-09-29 | End: 2018-10-15 | Stop reason: SDUPTHER

## 2018-09-29 NOTE — ASSESSMENT & PLAN NOTE
Will refer to PT for evaluation and treatment  Will also prescribe cyclobenzaprine 5 - 10 mg to be taken every 8 hr as needed for muscle spasms, ibuprofen 800 mg every 6 hours as needed for mild-moderate pain, and tramadol 50 mg every 8-hours as needed for moderate to severe pain  Advise he follow up with his chiropractor  Will defer on referring back to pain management (Dr Shadi Aguilar) at this time, however if symptoms persist, worsen, will refer

## 2018-09-29 NOTE — PROGRESS NOTES
Assessment/Plan:    Acute right-sided low back pain without sciatica  Will refer to PT for evaluation and treatment  Will also prescribe cyclobenzaprine 5 - 10 mg to be taken every 8 hr as needed for muscle spasms, ibuprofen 800 mg every 6 hours as needed for mild-moderate pain, and tramadol 50 mg every 8-hours as needed for moderate to severe pain  Advise he follow up with his chiropractor  Will defer on referring back to pain management (Dr Rona Key) at this time, however if symptoms persist, worsen, will refer  Other male erectile dysfunction   Will check a testosterone level  Will prescribe sildenafil 50 mg daily as needed  Will defer on referring to Urology or Neurology at this time however if symptoms do not improve with sildenafil, will then refer  Diagnoses and all orders for this visit:    Acute right-sided low back pain without sciatica  -     cyclobenzaprine (FLEXERIL) 5 mg tablet; Take 1-2 tablets every 8 hr as needed for muscle spasms  -     traMADol (ULTRAM) 50 mg tablet; Take 1 tablet (50 mg total) by mouth every 8 (eight) hours as needed for moderate pain or severe pain  -     ibuprofen (MOTRIN) 800 mg tablet; Take 1 tablet (800 mg total) by mouth every 6 (six) hours as needed for mild pain or moderate pain  -     Ambulatory referral to Physical Therapy; Future    Other male erectile dysfunction  -     Testosterone, free, total; Future  -     sildenafil (VIAGRA) 50 MG tablet; Take 1 tablet (50 mg total) by mouth daily as needed for erectile dysfunction          Subjective:      Patient ID: Raffaele Chávez is a 41 y o  male  79-year-old male is seen today with acute on chronic right lower back pain  He has a history of moderate central canal narrowing of the L4-L5 with multilevel spondylosis  He has been evaluated by a spine and pain in the past, most recently in 05/2017 to which he received epidural injections  No follow-up since    This flare occurred after he was bowling to which he bent over to  his bowling ball bag and noticed a pole in his right lower back  He denies any radicular symptoms at this time however does report that over the past week, even prior to this episode, erectile dysfunction  He reports that he is able to initiate an erection however is unable to maintain  He denies any bowel or bladder incontinence  Previous MRI of the lumbar spine from 12/2017 showed " Multilevel spondylosis with moderate central canal narrowing at L4-5"  He completed PT in 12/2017 for low back pain  Back Pain   This is a new problem  The current episode started in the past 7 days  The problem occurs daily  The problem is unchanged  The pain is present in the lumbar spine  The pain does not radiate  The pain is at a severity of 8/10  The pain is moderate  The pain is the same all the time  The symptoms are aggravated by twisting, bending, position and standing  Stiffness is present all day  Pertinent negatives include no abdominal pain, bladder incontinence, bowel incontinence, chest pain, dysuria, fever, headaches, leg pain, numbness, paresis, paresthesias, pelvic pain, perianal numbness, tingling, weakness or weight loss  (Erectile dysfunction) Risk factors:  Multilevel spondylosis with moderate central canal narrowing at L4-5  He has tried muscle relaxant (Oxycodone- acetaminophen 5-325 mg) for the symptoms  The treatment provided mild relief  The following portions of the patient's history were reviewed and updated as appropriate: allergies, current medications, past family history, past medical history, past social history, past surgical history and problem list     Review of Systems   Constitutional: Negative for activity change, appetite change, chills, diaphoresis, fatigue, fever and weight loss  HENT: Negative for congestion, postnasal drip, rhinorrhea, sinus pain, sinus pressure, sneezing and sore throat  Eyes: Negative for visual disturbance  Respiratory: Negative for apnea, cough, choking, chest tightness, shortness of breath and wheezing  Cardiovascular: Negative for chest pain, palpitations and leg swelling  Gastrointestinal: Negative for abdominal distention, abdominal pain, anal bleeding, blood in stool, bowel incontinence, constipation, diarrhea, nausea and vomiting  Endocrine: Negative for cold intolerance and heat intolerance  Genitourinary: Negative for bladder incontinence, difficulty urinating, dysuria, hematuria and pelvic pain  Musculoskeletal: Positive for back pain  Skin: Negative  Neurological: Negative for dizziness, tingling, weakness, light-headedness, numbness, headaches and paresthesias  Hematological: Negative for adenopathy  Psychiatric/Behavioral: Negative for agitation, sleep disturbance and suicidal ideas  All other systems reviewed and are negative  Past Medical History:   Diagnosis Date    Diabetes mellitus ([facility] )     Diabetes mellitus ([facility] )     other type with circulatory complication, without long-term current    Hearing loss     r ear only    Obesity     Onychomycosis of multiple toenails with type 2 diabetes mellitus ([facility] )     last assessed: 3/21/2017         Current Outpatient Prescriptions:     Cetirizine HCl (ZYRTEC ALLERGY PO), Take by mouth as needed, Disp: , Rfl:     DiphenhydrAMINE HCl (BENADRYL ALLERGY PO), Take by mouth as needed, Disp: , Rfl:     Fexofenadine HCl (ALLEGRA PO), Take 1 tablet by mouth as needed  , Disp: , Rfl:     metFORMIN (GLUCOPHAGE) 500 mg tablet, Take 1 tablet (500 mg total) by mouth 2 (two) times a day with meals, Disp: 180 tablet, Rfl: 1    cyclobenzaprine (FLEXERIL) 5 mg tablet, Take 1-2 tablets every 8 hr as needed for muscle spasms  , Disp: 30 tablet, Rfl: 0    ibuprofen (MOTRIN) 800 mg tablet, Take 1 tablet (800 mg total) by mouth every 6 (six) hours as needed for mild pain or moderate pain, Disp: 30 tablet, Rfl: 0    lisinopril (ZESTRIL) 10 mg tablet, Take 1 tablet (10 mg total) by mouth daily (Patient not taking: Reported on 9/29/2018 ), Disp: 90 tablet, Rfl: 1    sildenafil (VIAGRA) 50 MG tablet, Take 1 tablet (50 mg total) by mouth daily as needed for erectile dysfunction, Disp: 10 tablet, Rfl: 0    traMADol (ULTRAM) 50 mg tablet, Take 1 tablet (50 mg total) by mouth every 8 (eight) hours as needed for moderate pain or severe pain, Disp: 30 tablet, Rfl: 0    triamcinolone (KENALOG) 0 1 % cream, Apply topically 2 (two) times a day (Patient not taking: Reported on 8/24/2018 ), Disp: 45 g, Rfl: 1    Allergies   Allergen Reactions    Menthol Rash       Social History   Past Surgical History:   Procedure Laterality Date    MOUTH SURGERY  12/28/2017    TONSILLECTOMY AND ADENOIDECTOMY       Family History   Problem Relation Age of Onset    Diabetes Mother     Stomach cancer Other        Objective:  /88 (BP Location: Left arm, Patient Position: Sitting, Cuff Size: Adult)   Pulse (!) 54   Temp 97 9 °F (36 6 °C)   Wt (!) 166 kg (365 lb 6 4 oz)   SpO2 96% Comment: room air  BMI 48 96 kg/m²     Recent Results (from the past 1344 hour(s))   CBC and differential    Collection Time: 08/16/18 11:30 AM   Result Value Ref Range    White Blood Cell Count 10 0 3 4 - 10 8 x10E3/uL    Red Blood Cell Count 4 99 4 14 - 5 80 x10E6/uL    Hemoglobin 15 3 13 0 - 17 7 g/dL    HCT 45 8 37 5 - 51 0 %    MCV 92 79 - 97 fL    MCH 30 7 26 6 - 33 0 pg    MCHC 33 4 31 5 - 35 7 g/dL    RDW 13 6 12 3 - 15 4 %    Platelet Count 855 861 - 379 x10E3/uL    Neutrophils 74 Not Estab  %    Lymphocytes 16 Not Estab  %    Monocytes 5 Not Estab  %    Eosinophils 5 Not Estab  %    Basophils Relative 0 Not Estab  %    Neutrophils (Absolute) 7 4 (H) 1 4 - 7 0 x10E3/uL    Lymphocytes (Absolute) 1 6 0 7 - 3 1 x10E3/uL    Monocytes (Absolute) 0 5 0 1 - 0 9 x10E3/uL    Eosinophils (Absolute) 0 5 (H) 0 0 - 0 4 x10E3/uL    Basophils (Absolute) 0 0 0 0 - 0 2 x10E3/uL    Immature Granulocytes 0 Not Estab  %    Immature Granulocytes (Absolute) 0 0 0 0 - 0 1 x10E3/uL   Comprehensive metabolic panel    Collection Time: 08/16/18 11:30 AM   Result Value Ref Range    Glucose 95 65 - 99 mg/dL    BUN 10 6 - 20 mg/dL    Creatinine 0 74 (L) 0 76 - 1 27 mg/dL    eGFR Non African American 119 >59 mL/min/1 73    SL AMB EGFR AFRICAN AMERICAN 137 >59 mL/min/1 73    SL AMB BUN/CREATININE RATIO 14 9 - 20    Sodium 144 134 - 144 mmol/L    SL AMB POTASSIUM 4 5 3 5 - 5 2 mmol/L    Chloride 99 96 - 106 mmol/L    CO2 31 (H) 20 - 29 mmol/L    CALCIUM 9 1 8 7 - 10 2 mg/dL    SL AMB PROTEIN, TOTAL 6 9 6 0 - 8 5 g/dL    Albumin 4 0 3 5 - 5 5 g/dL    Globulin, Total 2 9 1 5 - 4 5 g/dL    SL AMB ALBUMIN/GLOBULIN RATIO 1 4 1 2 - 2 2    TOTAL BILIRUBIN 0 4 0 0 - 1 2 mg/dL    Alk Phos Isoenzymes 91 39 - 117 IU/L    SL AMB AST 26 0 - 40 IU/L    SL AMB ALT 47 (H) 0 - 44 IU/L   Lipid panel    Collection Time: 08/16/18 11:30 AM   Result Value Ref Range    Cholesterol, Total 156 100 - 199 mg/dL    Triglycerides 75 0 - 149 mg/dL    HDL 39 (L) >39 mg/dL    SL AMB VLDL CHOLESTEROL CALC 15 5 - 40 mg/dL    LDL Direct 102 (H) 0 - 99 mg/dL    T  Chol/HDL Ratio 4 0 0 0 - 5 0 ratio   Hemoglobin A1C    Collection Time: 08/16/18 11:30 AM   Result Value Ref Range    Hemoglobin A1C 5 3 4 8 - 5 6 %    Estimated Average Glucose 105 mg/dL   TSH, 3rd generation with Free T4 reflex    Collection Time: 08/16/18 11:30 AM   Result Value Ref Range    TSH 1 470 0 450 - 4 500 uIU/mL            Physical Exam   Constitutional: He is oriented to person, place, and time  He appears well-developed and well-nourished  No distress  HENT:   Head: Normocephalic and atraumatic  Eyes: Pupils are equal, round, and reactive to light  Conjunctivae and EOM are normal  Right eye exhibits no discharge  Left eye exhibits no discharge  No scleral icterus  Neck: Normal range of motion  Neck supple  No JVD present  No thyromegaly present     Cardiovascular: Normal rate, regular rhythm, normal heart sounds and intact distal pulses  Exam reveals no gallop and no friction rub  No murmur heard  Pulmonary/Chest: Effort normal and breath sounds normal  No respiratory distress  He has no wheezes  He has no rales  He exhibits no tenderness  Abdominal: Soft  Bowel sounds are normal  He exhibits no distension and no mass  There is no tenderness  There is no rebound and no guarding  Musculoskeletal: Normal range of motion  He exhibits no edema, tenderness or deformity  Lymphadenopathy:     He has no cervical adenopathy  Neurological: He is alert and oriented to person, place, and time  He has normal reflexes  No cranial nerve deficit  Coordination normal    Skin: Skin is warm and dry  No rash noted  He is not diaphoretic  No erythema  No pallor  Psychiatric: He has a normal mood and affect  His behavior is normal  Judgment and thought content normal    Nursing note and vitals reviewed

## 2018-09-29 NOTE — ASSESSMENT & PLAN NOTE
Will check a testosterone level  Will prescribe sildenafil 50 mg daily as needed  Will defer on referring to Urology or Neurology at this time however if symptoms do not improve with sildenafil, will then refer

## 2018-10-05 ENCOUNTER — CLINICAL SUPPORT (OUTPATIENT)
Dept: INTERNAL MEDICINE CLINIC | Facility: CLINIC | Age: 38
End: 2018-10-05
Payer: COMMERCIAL

## 2018-10-05 DIAGNOSIS — N52.9 ERECTILE DYSFUNCTION, UNSPECIFIED ERECTILE DYSFUNCTION TYPE: Primary | ICD-10-CM

## 2018-10-05 PROCEDURE — 36415 COLL VENOUS BLD VENIPUNCTURE: CPT

## 2018-10-07 LAB
TESTOST FREE SERPL-MCNC: 9.7 PG/ML (ref 8.7–25.1)
TESTOST SERPL-MCNC: 354 NG/DL (ref 264–916)

## 2018-10-12 ENCOUNTER — EVALUATION (OUTPATIENT)
Dept: PHYSICAL THERAPY | Facility: REHABILITATION | Age: 38
End: 2018-10-12
Payer: COMMERCIAL

## 2018-10-12 DIAGNOSIS — M54.50 ACUTE RIGHT-SIDED LOW BACK PAIN WITHOUT SCIATICA: ICD-10-CM

## 2018-10-12 PROCEDURE — 97162 PT EVAL MOD COMPLEX 30 MIN: CPT | Performed by: PHYSICAL THERAPIST

## 2018-10-12 PROCEDURE — G8990 OTHER PT/OT CURRENT STATUS: HCPCS | Performed by: PHYSICAL THERAPIST

## 2018-10-12 PROCEDURE — 97110 THERAPEUTIC EXERCISES: CPT | Performed by: PHYSICAL THERAPIST

## 2018-10-12 PROCEDURE — G8991 OTHER PT/OT GOAL STATUS: HCPCS | Performed by: PHYSICAL THERAPIST

## 2018-10-12 NOTE — PROGRESS NOTES
PT Evaluation     Today's date: 10/12/2018  Patient name: Yassine Posadas  : 1980  MRN: [id number]  Referring provider: Abiodun Dalton MD  Dx:   Encounter Diagnosis     ICD-10-CM    1  Acute right-sided low back pain without sciatica M54 5 Ambulatory referral to Physical Therapy                  Assessment  Impairments: abnormal or restricted ROM, activity intolerance, lacks appropriate home exercise program and pain with function    Symptom irritability: low  Assessment details: Pt is a 42 yo male with a history of low back pain due to HNP and narrowing of the spinal canal which resulted in significant weakness and radicular symptoms  Following a year of rehab he was able to return to work and bowling and pain resolved  Last week he had an acute episode of right low back pain that he reports was similar to the initial episode of pain  This occurred after bowling while in a forward bent position  Today he presents with limited lumbar ROM, (+) dural tension and hamstring tightness  He would benefit from nerve gliding, hamstring stretching and standing extension exercises to reduce neural tension and possible in disc derangement  Understanding of Dx/Px/POC: excellent  Goals  STG: Resolve neural tension signs  Pt is using improved body mechanics a work  Pt is performing standing extension exercises after all bending activity  LTG: Resolve pain  Improved hamstring flexibility 25%  Independent with HEP     Plan  Patient would benefit from: skilled physical therapy  Referral necessary: No  Planned therapy interventions: joint mobilization, neuromuscular re-education, stretching and functional ROM exercises  Frequency: 2x week  Duration in weeks: 12  Treatment plan discussed with: patient        Subjective Evaluation    History of Present Illness  Date of onset: 10/4/2018  Mechanism of injury: After bowling he bend forward and experienced  right sided low back pain    Pain  Current pain rating: 0  At best pain ratin  At worst pain rating: 10  Location: Right low back   Quality: sharp  Relieving factors: change in position  Aggravating factors: standing      Diagnostic Tests  No diagnostic tests performed  Treatments  Previous treatment: physical therapy  Patient Goals  Patient goals for therapy: decreased pain  Patient goal: avoid recurrence of pain  Objective     Special Questions  Negative for night pain and disturbed sleep    Additional Special Questions  Is noting some erectile dysfunction and is considering going back to the neurologist     Tenderness     Additional Tenderness Details  No tenderness noted    Neurological Testing     Reflexes   Left   Patellar (L4): trace (1+)  Achilles (S1): absent (0)    Right   Patellar (L4): absent (0)  Achilles (S1): absent (0)    Active Range of Motion     Additional Active Range of Motion Details  Lumbar ROM is moderately limited in all directions  No pain with testing  Slight left deviation with flexion  Does not change with repetition  Strength/Myotome Testing     Lumbar   Left   Normal strength    Right   Normal strength    Tests       Thoracic   Positive slump  Lumbar   Positive slumped  Left   Positive passive SLR  Right   Positive passive SLR       General Comments     Lumbar Comments  Lumbar Mechanical Assessment:  Repeated Lumbar Extension in Standing: no effect  Repeated Lumbar Flexion in Standing: no effect  Repeated Lumbar Flexion in Lying: no effect  Repeated Lumbar Extension in Lying: produced right buttock pain no worse  :      Flowsheet Rows      Most Recent Value   PT/OT G-Codes   Current Score  70   Projected Score  84          Precautions: DM    Daily Treatment Diary     Manual  10/12            PA lumbar Gr 4                                                                    Exercise Diary              Seated nerve sliders 10 ea            Seated hamstring stretch 20" 3x            Standing extension 10 Body mechanics instr 5 min                                                                                                           Modalities

## 2018-10-15 ENCOUNTER — OFFICE VISIT (OUTPATIENT)
Dept: PHYSICAL THERAPY | Facility: REHABILITATION | Age: 38
End: 2018-10-15
Payer: COMMERCIAL

## 2018-10-15 DIAGNOSIS — N52.8 OTHER MALE ERECTILE DYSFUNCTION: ICD-10-CM

## 2018-10-15 DIAGNOSIS — M54.50 ACUTE RIGHT-SIDED LOW BACK PAIN WITHOUT SCIATICA: Primary | ICD-10-CM

## 2018-10-15 PROCEDURE — 97110 THERAPEUTIC EXERCISES: CPT | Performed by: PHYSICAL THERAPIST

## 2018-10-15 RX ORDER — SILDENAFIL 50 MG/1
50 TABLET, FILM COATED ORAL DAILY PRN
Qty: 10 TABLET | Refills: 3 | Status: SHIPPED | OUTPATIENT
Start: 2018-10-15 | End: 2019-03-21

## 2018-10-15 NOTE — PROGRESS NOTES
Daily Note     Today's date: 10/15/2018  Patient name: Joseph Bright  : 1980  MRN: [id number]  Referring provider: Kvng Tony MD  Dx:   Encounter Diagnosis     ICD-10-CM    1  Acute right-sided low back pain without sciatica M54 5                   Subjective: Pt reports that he hasn't had pain since his eval        Objective: See treatment diary below      Assessment: Pt demonstrated moderate calf tightness which was decreased with stretching  Pt was easily fatigued with glute strengthening  Pt would benefit from skilled outpatient PT to address strength, core stabilization, and muscular endurance in order to maximize his level of function  Plan: Continue per plan of care  Progress treatment as tolerated          Precautions: DM    Daily Treatment Diary     Manual  10/12 10/15           PA lumbar Gr 4 x                                                                   Exercise Diary              Seated nerve sliders 10 ea x           Seated hamstring stretch 20" 3x x           Standing extension 10 x           Standing calf stretch   3x30s           TB side step   2 laps blue           bridges  3x10           Clams R/L   3x10                                                                            Body mechanics instr 5 min                                                                                                           Modalities

## 2018-10-18 ENCOUNTER — APPOINTMENT (OUTPATIENT)
Dept: PHYSICAL THERAPY | Facility: REHABILITATION | Age: 38
End: 2018-10-18
Payer: COMMERCIAL

## 2018-10-23 ENCOUNTER — APPOINTMENT (OUTPATIENT)
Dept: PHYSICAL THERAPY | Facility: REHABILITATION | Age: 38
End: 2018-10-23
Payer: COMMERCIAL

## 2018-10-24 ENCOUNTER — ANESTHESIA EVENT (EMERGENCY)
Dept: PERIOP | Facility: HOSPITAL | Age: 38
DRG: 418 | End: 2018-10-24
Payer: COMMERCIAL

## 2018-10-24 ENCOUNTER — ANESTHESIA (EMERGENCY)
Dept: PERIOP | Facility: HOSPITAL | Age: 38
DRG: 418 | End: 2018-10-24
Payer: COMMERCIAL

## 2018-10-24 ENCOUNTER — HOSPITAL ENCOUNTER (INPATIENT)
Facility: HOSPITAL | Age: 38
LOS: 1 days | Discharge: HOME | DRG: 418 | End: 2018-10-24
Attending: EMERGENCY MEDICINE | Admitting: SURGERY
Payer: COMMERCIAL

## 2018-10-24 ENCOUNTER — APPOINTMENT (EMERGENCY)
Dept: RADIOLOGY | Facility: HOSPITAL | Age: 38
DRG: 418 | End: 2018-10-24
Payer: COMMERCIAL

## 2018-10-24 ENCOUNTER — APPOINTMENT (EMERGENCY)
Dept: RADIOLOGY | Facility: HOSPITAL | Age: 38
DRG: 418 | End: 2018-10-24
Attending: EMERGENCY MEDICINE
Payer: COMMERCIAL

## 2018-10-24 VITALS
TEMPERATURE: 98 F | OXYGEN SATURATION: 80 % | HEART RATE: 60 BPM | RESPIRATION RATE: 17 BRPM | BODY MASS INDEX: 42.66 KG/M2 | WEIGHT: 315 LBS | SYSTOLIC BLOOD PRESSURE: 135 MMHG | DIASTOLIC BLOOD PRESSURE: 79 MMHG | HEIGHT: 72 IN

## 2018-10-24 DIAGNOSIS — K80.70 CHOLELITHIASIS WITH CHOLEDOCHOLITHIASIS: Primary | ICD-10-CM

## 2018-10-24 LAB
ALBUMIN SERPL BCP-MCNC: 3.5 G/DL (ref 3.5–5)
ALP SERPL-CCNC: 110 U/L (ref 46–116)
ALT SERPL W P-5'-P-CCNC: 174 U/L (ref 12–78)
ANION GAP SERPL CALCULATED.3IONS-SCNC: 3 MMOL/L (ref 4–13)
AST SERPL W P-5'-P-CCNC: 155 U/L (ref 5–45)
ATRIAL RATE: 55 BPM
BASOPHILS # BLD AUTO: 0.05 THOUSANDS/ΜL (ref 0–0.1)
BASOPHILS NFR BLD AUTO: 0 % (ref 0–1)
BILIRUB SERPL-MCNC: 1.11 MG/DL (ref 0.2–1)
BUN SERPL-MCNC: 10 MG/DL (ref 5–25)
CALCIUM SERPL-MCNC: 8.8 MG/DL (ref 8.3–10.1)
CHLORIDE SERPL-SCNC: 100 MMOL/L (ref 100–108)
CO2 SERPL-SCNC: 33 MMOL/L (ref 21–32)
CREAT SERPL-MCNC: 0.92 MG/DL (ref 0.6–1.3)
EOSINOPHIL # BLD AUTO: 0.3 THOUSAND/ΜL (ref 0–0.61)
EOSINOPHIL NFR BLD AUTO: 3 % (ref 0–6)
ERYTHROCYTE [DISTWIDTH] IN BLOOD BY AUTOMATED COUNT: 13.2 % (ref 11.6–15.1)
GFR SERPL CREATININE-BSD FRML MDRD: 107 ML/MIN/1.73SQ M
GLUCOSE SERPL-MCNC: 139 MG/DL (ref 65–140)
GLUCOSE SERPL-MCNC: 178 MG/DL (ref 65–140)
HCT VFR BLD AUTO: 48.1 % (ref 36.5–49.3)
HGB BLD-MCNC: 15.3 G/DL (ref 12–17)
HOLD SPECIMEN: NORMAL
IMM GRANULOCYTES # BLD AUTO: 0.04 THOUSAND/UL (ref 0–0.2)
IMM GRANULOCYTES NFR BLD AUTO: 0 % (ref 0–2)
LIPASE SERPL-CCNC: 88 U/L (ref 73–393)
LYMPHOCYTES # BLD AUTO: 1.54 THOUSANDS/ΜL (ref 0.6–4.47)
LYMPHOCYTES NFR BLD AUTO: 13 % (ref 14–44)
MCH RBC QN AUTO: 30 PG (ref 26.8–34.3)
MCHC RBC AUTO-ENTMCNC: 31.8 G/DL (ref 31.4–37.4)
MCV RBC AUTO: 94 FL (ref 82–98)
MONOCYTES # BLD AUTO: 0.77 THOUSAND/ΜL (ref 0.17–1.22)
MONOCYTES NFR BLD AUTO: 6 % (ref 4–12)
NEUTROPHILS # BLD AUTO: 9.5 THOUSANDS/ΜL (ref 1.85–7.62)
NEUTS SEG NFR BLD AUTO: 78 % (ref 43–75)
NRBC BLD AUTO-RTO: 0 /100 WBCS
P AXIS: 73 DEGREES
PLATELET # BLD AUTO: 274 THOUSANDS/UL (ref 149–390)
PMV BLD AUTO: 9.3 FL (ref 8.9–12.7)
POTASSIUM SERPL-SCNC: 3.9 MMOL/L (ref 3.5–5.3)
PR INTERVAL: 184 MS
PROT SERPL-MCNC: 7.5 G/DL (ref 6.4–8.2)
QRS AXIS: 76 DEGREES
QRSD INTERVAL: 108 MS
QT INTERVAL: 430 MS
QTC INTERVAL: 411 MS
RBC # BLD AUTO: 5.1 MILLION/UL (ref 3.88–5.62)
SODIUM SERPL-SCNC: 136 MMOL/L (ref 136–145)
T WAVE AXIS: 64 DEGREES
TROPONIN I SERPL-MCNC: <0.02 NG/ML
VENTRICULAR RATE: 55 BPM
WBC # BLD AUTO: 12.2 THOUSAND/UL (ref 4.31–10.16)

## 2018-10-24 PROCEDURE — 96374 THER/PROPH/DIAG INJ IV PUSH: CPT

## 2018-10-24 PROCEDURE — 99222 1ST HOSP IP/OBS MODERATE 55: CPT | Performed by: SURGERY

## 2018-10-24 PROCEDURE — 82948 REAGENT STRIP/BLOOD GLUCOSE: CPT

## 2018-10-24 PROCEDURE — 36415 COLL VENOUS BLD VENIPUNCTURE: CPT

## 2018-10-24 PROCEDURE — BF131ZZ FLUOROSCOPY OF GALLBLADDER AND BILE DUCTS USING LOW OSMOLAR CONTRAST: ICD-10-PCS | Performed by: SURGERY

## 2018-10-24 PROCEDURE — 96361 HYDRATE IV INFUSION ADD-ON: CPT

## 2018-10-24 PROCEDURE — 93005 ELECTROCARDIOGRAM TRACING: CPT

## 2018-10-24 PROCEDURE — 76705 ECHO EXAM OF ABDOMEN: CPT

## 2018-10-24 PROCEDURE — 93010 ELECTROCARDIOGRAM REPORT: CPT | Performed by: INTERNAL MEDICINE

## 2018-10-24 PROCEDURE — 71046 X-RAY EXAM CHEST 2 VIEWS: CPT

## 2018-10-24 PROCEDURE — 83690 ASSAY OF LIPASE: CPT | Performed by: EMERGENCY MEDICINE

## 2018-10-24 PROCEDURE — 0FT44ZZ RESECTION OF GALLBLADDER, PERCUTANEOUS ENDOSCOPIC APPROACH: ICD-10-PCS | Performed by: SURGERY

## 2018-10-24 PROCEDURE — 80053 COMPREHEN METABOLIC PANEL: CPT | Performed by: EMERGENCY MEDICINE

## 2018-10-24 PROCEDURE — 85025 COMPLETE CBC W/AUTO DIFF WBC: CPT | Performed by: EMERGENCY MEDICINE

## 2018-10-24 PROCEDURE — 84484 ASSAY OF TROPONIN QUANT: CPT | Performed by: EMERGENCY MEDICINE

## 2018-10-24 PROCEDURE — 88304 TISSUE EXAM BY PATHOLOGIST: CPT | Performed by: PATHOLOGY

## 2018-10-24 PROCEDURE — 74300 X-RAY BILE DUCTS/PANCREAS: CPT

## 2018-10-24 PROCEDURE — 47563 LAPARO CHOLECYSTECTOMY/GRAPH: CPT | Performed by: SURGERY

## 2018-10-24 PROCEDURE — 99285 EMERGENCY DEPT VISIT HI MDM: CPT

## 2018-10-24 RX ORDER — SODIUM CHLORIDE 9 MG/ML
125 INJECTION, SOLUTION INTRAVENOUS CONTINUOUS
Status: DISCONTINUED | OUTPATIENT
Start: 2018-10-24 | End: 2018-10-24 | Stop reason: HOSPADM

## 2018-10-24 RX ORDER — MAGNESIUM HYDROXIDE 1200 MG/15ML
LIQUID ORAL AS NEEDED
Status: DISCONTINUED | OUTPATIENT
Start: 2018-10-24 | End: 2018-10-24 | Stop reason: HOSPADM

## 2018-10-24 RX ORDER — LIDOCAINE HYDROCHLORIDE 10 MG/ML
INJECTION, SOLUTION INFILTRATION; PERINEURAL AS NEEDED
Status: DISCONTINUED | OUTPATIENT
Start: 2018-10-24 | End: 2018-10-24 | Stop reason: SURG

## 2018-10-24 RX ORDER — HYDROMORPHONE HCL/PF 1 MG/ML
1 SYRINGE (ML) INJECTION ONCE
Status: COMPLETED | OUTPATIENT
Start: 2018-10-24 | End: 2018-10-24

## 2018-10-24 RX ORDER — GLYCOPYRROLATE 0.2 MG/ML
INJECTION INTRAMUSCULAR; INTRAVENOUS AS NEEDED
Status: DISCONTINUED | OUTPATIENT
Start: 2018-10-24 | End: 2018-10-24 | Stop reason: SURG

## 2018-10-24 RX ORDER — ROCURONIUM BROMIDE 10 MG/ML
INJECTION, SOLUTION INTRAVENOUS AS NEEDED
Status: DISCONTINUED | OUTPATIENT
Start: 2018-10-24 | End: 2018-10-24 | Stop reason: SURG

## 2018-10-24 RX ORDER — HYDROMORPHONE HCL/PF 1 MG/ML
0.2 SYRINGE (ML) INJECTION
Status: DISCONTINUED | OUTPATIENT
Start: 2018-10-24 | End: 2018-10-24 | Stop reason: HOSPADM

## 2018-10-24 RX ORDER — MIDAZOLAM HYDROCHLORIDE 1 MG/ML
INJECTION INTRAMUSCULAR; INTRAVENOUS AS NEEDED
Status: DISCONTINUED | OUTPATIENT
Start: 2018-10-24 | End: 2018-10-24 | Stop reason: SURG

## 2018-10-24 RX ORDER — OXYCODONE HYDROCHLORIDE AND ACETAMINOPHEN 5; 325 MG/1; MG/1
1 TABLET ORAL EVERY 4 HOURS PRN
Status: DISCONTINUED | OUTPATIENT
Start: 2018-10-24 | End: 2018-10-24 | Stop reason: HOSPADM

## 2018-10-24 RX ORDER — SUCCINYLCHOLINE CHLORIDE 20 MG/ML
INJECTION INTRAMUSCULAR; INTRAVENOUS AS NEEDED
Status: DISCONTINUED | OUTPATIENT
Start: 2018-10-24 | End: 2018-10-24 | Stop reason: SURG

## 2018-10-24 RX ORDER — PROPOFOL 10 MG/ML
INJECTION, EMULSION INTRAVENOUS AS NEEDED
Status: DISCONTINUED | OUTPATIENT
Start: 2018-10-24 | End: 2018-10-24 | Stop reason: SURG

## 2018-10-24 RX ORDER — FENTANYL CITRATE 50 UG/ML
INJECTION, SOLUTION INTRAMUSCULAR; INTRAVENOUS AS NEEDED
Status: DISCONTINUED | OUTPATIENT
Start: 2018-10-24 | End: 2018-10-24 | Stop reason: SURG

## 2018-10-24 RX ORDER — OXYCODONE HYDROCHLORIDE AND ACETAMINOPHEN 5; 325 MG/1; MG/1
1 TABLET ORAL EVERY 4 HOURS PRN
Qty: 20 TABLET | Refills: 0 | Status: SHIPPED | OUTPATIENT
Start: 2018-10-24 | End: 2018-10-29

## 2018-10-24 RX ORDER — ONDANSETRON 2 MG/ML
4 INJECTION INTRAMUSCULAR; INTRAVENOUS EVERY 4 HOURS PRN
Status: DISCONTINUED | OUTPATIENT
Start: 2018-10-24 | End: 2018-10-24 | Stop reason: HOSPADM

## 2018-10-24 RX ORDER — HYDROMORPHONE HCL/PF 1 MG/ML
1 SYRINGE (ML) INJECTION EVERY 4 HOURS PRN
Status: DISCONTINUED | OUTPATIENT
Start: 2018-10-24 | End: 2018-10-24 | Stop reason: HOSPADM

## 2018-10-24 RX ORDER — MEPERIDINE HYDROCHLORIDE 25 MG/ML
12.5 INJECTION INTRAMUSCULAR; INTRAVENOUS; SUBCUTANEOUS ONCE AS NEEDED
Status: DISCONTINUED | OUTPATIENT
Start: 2018-10-24 | End: 2018-10-24 | Stop reason: HOSPADM

## 2018-10-24 RX ORDER — DOCUSATE SODIUM 100 MG/1
100 CAPSULE, LIQUID FILLED ORAL 2 TIMES DAILY
Qty: 30 CAPSULE | Refills: 0 | Status: SHIPPED | OUTPATIENT
Start: 2018-10-24 | End: 2019-03-21

## 2018-10-24 RX ORDER — ONDANSETRON 2 MG/ML
INJECTION INTRAMUSCULAR; INTRAVENOUS AS NEEDED
Status: DISCONTINUED | OUTPATIENT
Start: 2018-10-24 | End: 2018-10-24 | Stop reason: SURG

## 2018-10-24 RX ORDER — SODIUM CHLORIDE, SODIUM LACTATE, POTASSIUM CHLORIDE, CALCIUM CHLORIDE 600; 310; 30; 20 MG/100ML; MG/100ML; MG/100ML; MG/100ML
INJECTION, SOLUTION INTRAVENOUS CONTINUOUS PRN
Status: DISCONTINUED | OUTPATIENT
Start: 2018-10-24 | End: 2018-10-24 | Stop reason: SURG

## 2018-10-24 RX ORDER — KETOROLAC TROMETHAMINE 30 MG/ML
INJECTION, SOLUTION INTRAMUSCULAR; INTRAVENOUS AS NEEDED
Status: DISCONTINUED | OUTPATIENT
Start: 2018-10-24 | End: 2018-10-24 | Stop reason: SURG

## 2018-10-24 RX ORDER — OXYCODONE HYDROCHLORIDE AND ACETAMINOPHEN 5; 325 MG/1; MG/1
2 TABLET ORAL EVERY 4 HOURS PRN
Status: DISCONTINUED | OUTPATIENT
Start: 2018-10-24 | End: 2018-10-24 | Stop reason: HOSPADM

## 2018-10-24 RX ORDER — ONDANSETRON 2 MG/ML
4 INJECTION INTRAMUSCULAR; INTRAVENOUS ONCE AS NEEDED
Status: DISCONTINUED | OUTPATIENT
Start: 2018-10-24 | End: 2018-10-24 | Stop reason: HOSPADM

## 2018-10-24 RX ORDER — METOCLOPRAMIDE HYDROCHLORIDE 5 MG/ML
10 INJECTION INTRAMUSCULAR; INTRAVENOUS ONCE AS NEEDED
Status: DISCONTINUED | OUTPATIENT
Start: 2018-10-24 | End: 2018-10-24 | Stop reason: HOSPADM

## 2018-10-24 RX ORDER — FENTANYL CITRATE/PF 50 MCG/ML
50 SYRINGE (ML) INJECTION
Status: DISCONTINUED | OUTPATIENT
Start: 2018-10-24 | End: 2018-10-24 | Stop reason: HOSPADM

## 2018-10-24 RX ORDER — SODIUM CHLORIDE, SODIUM LACTATE, POTASSIUM CHLORIDE, CALCIUM CHLORIDE 600; 310; 30; 20 MG/100ML; MG/100ML; MG/100ML; MG/100ML
50 INJECTION, SOLUTION INTRAVENOUS CONTINUOUS
Status: DISCONTINUED | OUTPATIENT
Start: 2018-10-24 | End: 2018-10-24 | Stop reason: HOSPADM

## 2018-10-24 RX ORDER — DOCUSATE SODIUM 100 MG/1
100 CAPSULE, LIQUID FILLED ORAL 2 TIMES DAILY PRN
Status: DISCONTINUED | OUTPATIENT
Start: 2018-10-24 | End: 2018-10-24 | Stop reason: HOSPADM

## 2018-10-24 RX ADMIN — ROCURONIUM BROMIDE 50 MG: 10 INJECTION INTRAVENOUS at 13:48

## 2018-10-24 RX ADMIN — OXYCODONE HYDROCHLORIDE AND ACETAMINOPHEN 1 TABLET: 5; 325 TABLET ORAL at 19:36

## 2018-10-24 RX ADMIN — SODIUM CHLORIDE 1000 ML: 0.9 INJECTION, SOLUTION INTRAVENOUS at 09:24

## 2018-10-24 RX ADMIN — GLYCOPYRROLATE 0.6 MG: 0.2 INJECTION, SOLUTION INTRAMUSCULAR; INTRAVENOUS at 15:16

## 2018-10-24 RX ADMIN — FENTANYL CITRATE 50 MCG: 50 INJECTION, SOLUTION INTRAMUSCULAR; INTRAVENOUS at 14:00

## 2018-10-24 RX ADMIN — SODIUM CHLORIDE 125 ML/HR: 0.9 INJECTION, SOLUTION INTRAVENOUS at 17:04

## 2018-10-24 RX ADMIN — SODIUM CHLORIDE, SODIUM LACTATE, POTASSIUM CHLORIDE, AND CALCIUM CHLORIDE 50 ML/HR: .6; .31; .03; .02 INJECTION, SOLUTION INTRAVENOUS at 17:52

## 2018-10-24 RX ADMIN — CEFAZOLIN SODIUM 1000 MG: 1 SOLUTION INTRAVENOUS at 13:20

## 2018-10-24 RX ADMIN — ROCURONIUM BROMIDE 20 MG: 10 INJECTION INTRAVENOUS at 14:54

## 2018-10-24 RX ADMIN — LIDOCAINE HYDROCHLORIDE 30 MG: 10 INJECTION, SOLUTION INFILTRATION; PERINEURAL at 13:40

## 2018-10-24 RX ADMIN — ONDANSETRON 4 MG: 2 INJECTION INTRAMUSCULAR; INTRAVENOUS at 15:07

## 2018-10-24 RX ADMIN — GLUCAGON HYDROCHLORIDE 1000 MCG: KIT at 14:43

## 2018-10-24 RX ADMIN — SUCCINYLCHOLINE CHLORIDE 260 MG: 20 INJECTION, SOLUTION INTRAMUSCULAR; INTRAVENOUS at 13:40

## 2018-10-24 RX ADMIN — SODIUM CHLORIDE, SODIUM LACTATE, POTASSIUM CHLORIDE, AND CALCIUM CHLORIDE: .6; .31; .03; .02 INJECTION, SOLUTION INTRAVENOUS at 15:23

## 2018-10-24 RX ADMIN — METRONIDAZOLE 500 MG: 500 INJECTION, SOLUTION INTRAVENOUS at 13:30

## 2018-10-24 RX ADMIN — NEOSTIGMINE METHYLSULFATE 4 MG: 1 INJECTION, SOLUTION INTRAMUSCULAR; INTRAVENOUS; SUBCUTANEOUS at 15:16

## 2018-10-24 RX ADMIN — HYDROMORPHONE HYDROCHLORIDE 1 MG: 1 INJECTION, SOLUTION INTRAMUSCULAR; INTRAVENOUS; SUBCUTANEOUS at 09:25

## 2018-10-24 RX ADMIN — GLYCOPYRROLATE 0.4 MG: 0.2 INJECTION, SOLUTION INTRAMUSCULAR; INTRAVENOUS at 13:42

## 2018-10-24 RX ADMIN — CEFAZOLIN SODIUM 2000 MG: 2 SOLUTION INTRAVENOUS at 13:25

## 2018-10-24 RX ADMIN — DEXAMETHASONE SODIUM PHOSPHATE 10 MG: 10 INJECTION INTRAMUSCULAR; INTRAVENOUS at 14:13

## 2018-10-24 RX ADMIN — KETOROLAC TROMETHAMINE 30 MG: 30 INJECTION, SOLUTION INTRAMUSCULAR at 15:16

## 2018-10-24 RX ADMIN — FENTANYL CITRATE 50 MCG: 50 INJECTION, SOLUTION INTRAMUSCULAR; INTRAVENOUS at 13:40

## 2018-10-24 RX ADMIN — SODIUM CHLORIDE, SODIUM LACTATE, POTASSIUM CHLORIDE, AND CALCIUM CHLORIDE: .6; .31; .03; .02 INJECTION, SOLUTION INTRAVENOUS at 13:30

## 2018-10-24 RX ADMIN — MIDAZOLAM 2 MG: 1 INJECTION INTRAMUSCULAR; INTRAVENOUS at 13:31

## 2018-10-24 RX ADMIN — PROPOFOL 400 MG: 10 INJECTION, EMULSION INTRAVENOUS at 13:40

## 2018-10-24 NOTE — ED PROVIDER NOTES
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 Taking? Cetirizine HCl (ZYRTEC ALLERGY PO)  Self Yes Yes   Sig: Take by mouth as needed   DiphenhydrAMINE HCl (BENADRYL ALLERGY PO)  Self Yes Yes   Sig: Take by mouth as needed   Fexofenadine HCl (ALLEGRA PO)  Self Yes Yes   Sig: Take 1 tablet by mouth as needed     cyclobenzaprine (FLEXERIL) 5 mg tablet   No Yes   Sig: Take 1-2 tablets every 8 hr as needed for muscle spasms     ibuprofen (MOTRIN) 800 mg tablet   No Yes   Sig: Take 1 tablet (800 mg total) by mouth every 6 (six) hours as needed for mild pain or moderate pain   lisinopril (ZESTRIL) 10 mg tablet Not Taking at Unknown time Self No No   Sig: Take 1 tablet (10 mg total) by mouth daily   Patient not taking: Reported on 9/29/2018    metFORMIN (GLUCOPHAGE) 500 mg tablet 10/24/2018 at Unknown time Self No Yes   Sig: Take 1 tablet (500 mg total) by mouth 2 (two) times a day with meals   sildenafil (VIAGRA) 50 MG tablet   No Yes   Sig: Take 1 tablet (50 mg total) by mouth daily as needed for erectile dysfunction   traMADol (ULTRAM) 50 mg tablet   No Yes   Sig: Take 1 tablet (50 mg total) by mouth every 8 (eight) hours as needed for moderate pain or severe pain   triamcinolone (KENALOG) 0 1 % cream  Self No Yes   Sig: Apply topically 2 (two) times a day      Facility-Administered Medications: None       Past Medical History:   Diagnosis Date    Diabetes mellitus ([facility] )     Diabetes mellitus ([facility] )     other type with circulatory complication, without long-term current    Hearing loss     r ear only    Obesity     Onychomycosis of multiple toenails with type 2 diabetes mellitus ([facility] )     last assessed: 3/21/2017       Past Surgical History:   Procedure Laterality Date    MOUTH SURGERY  12/28/2017    TONSILLECTOMY AND ADENOIDECTOMY         Family History   Problem Relation Age of Onset    Diabetes Mother     Stomach cancer Other      I have reviewed and agree with the history as documented  Social History   Substance Use Topics    Smoking status: Never Smoker    Smokeless tobacco: Never Used    Alcohol use Yes      Comment: social        Review of Systems   Constitutional: Negative for chills and fever  HENT: Negative for sore throat  Eyes: Negative for photophobia and visual disturbance  Respiratory: Negative for cough, chest tightness, shortness of breath and wheezing  Cardiovascular: Negative for chest pain, palpitations and leg swelling  Gastrointestinal: Negative for abdominal distention, abdominal pain, constipation, diarrhea, nausea and vomiting  Genitourinary: Negative for difficulty urinating, dysuria, flank pain and hematuria  Musculoskeletal: Negative for gait problem, joint swelling and myalgias  Neurological: Negative for syncope, weakness, numbness and headaches         Physical Exam  ED Triage Vitals   Temperature Pulse Respirations Blood Pressure SpO2   10/24/18 0824 10/24/18 0824 10/24/18 0824 10/24/18 0824 10/24/18 0824   97 5 °F (36 4 °C) 60 20 133/75 94 %      Temp Source Heart Rate Source Patient Position - Orthostatic VS BP Location FiO2 (%)   10/24/18 0824 10/24/18 8110 10/24/18 0824 10/24/18 0824 --   Oral Monitor Lying Left arm       Pain Score       10/24/18 0823       9           Orthostatic Vital Signs  Vitals:    10/24/18 1615 10/24/18 1630 10/24/18 1645 10/24/18 1700   BP: 104/55 103/56 110/62 110/57   Pulse: 76 66 68 66   Patient Position - Orthostatic VS:           Physical Exam   Constitutional: He is oriented to person, place, and time  He appears well-developed and well-nourished  No distress  HENT:   Head: Normocephalic and atraumatic  Right Ear: External ear normal    Left Ear: External ear normal    Nose: Nose normal    Mouth/Throat: Oropharynx is clear and moist  No oropharyngeal exudate  Eyes: Pupils are equal, round, and reactive to light  Conjunctivae are normal    Cardiovascular: Normal rate, regular rhythm, normal heart sounds and intact distal pulses  Exam reveals no gallop and no friction rub  No murmur heard  Pulmonary/Chest: Effort normal and breath sounds normal  No respiratory distress  He has no wheezes  He has no rales  He exhibits no tenderness  Abdominal: Soft  Bowel sounds are normal  He exhibits no distension and no mass  There is tenderness (severe midepigastric and RUQ)  There is no rebound and no guarding  No hernia  Musculoskeletal: He exhibits no edema or deformity  Neurological: He is alert and oriented to person, place, and time  Skin: Skin is warm and dry  Capillary refill takes less than 2 seconds  He is not diaphoretic  Psychiatric: He has a normal mood and affect  His behavior is normal    Nursing note and vitals reviewed        ED Medications  Medications   lactated ringers infusion (0 mL/hr Intravenous Stopped 10/24/18 1704)   meperidine (DEMEROL) injection 12 5 mg (not administered)   fentaNYL (SUBLIMAZE) injection 50 mcg (not administered)   HYDROmorphone (DILAUDID) injection 0 2 mg (not administered)   ondansetron (ZOFRAN) injection 4 mg (not administered)   metoclopramide (REGLAN) injection 10 mg (not administered)   sodium chloride 0 9 % infusion (125 mL/hr Intravenous New Bag 10/24/18 1704)   docusate sodium (COLACE) capsule 100 mg (not administered)   enoxaparin (LOVENOX) subcutaneous injection 40 mg (not administered)   oxyCODONE-acetaminophen (PERCOCET) 5-325 mg per tablet 1 tablet (not administered)   oxyCODONE-acetaminophen (PERCOCET) 5-325 mg per tablet 2 tablet (not administered)   HYDROmorphone (DILAUDID) injection 1 mg (not administered)   ondansetron (ZOFRAN) injection 4 mg (not administered)   sodium chloride 0 9 % bolus 1,000 mL (1,000 mL Intravenous New Bag 10/24/18 0924)   HYDROmorphone (DILAUDID) injection 1 mg (1 mg Intravenous Given 10/24/18 0925)   ceFAZolin (ANCEF) IVPB (premix) 2,000 mg (2,000 mg Intravenous Given 10/24/18 1325)     And   ceFAZolin (ANCEF) IVPB (premix) 1,000 mg (1,000 mg Intravenous Given 10/24/18 1320)   metroNIDAZOLE (FLAGYL) IVPB (premix) 500 mg (500 mg Intravenous Given 10/24/18 1330)       Diagnostic Studies  Results Reviewed     Procedure Component Value Units Date/Time    Troponin I [[id number]]  (Normal) Collected:  10/24/18 1014    Lab Status:  Final result Specimen:  Blood from Arm, Left Updated:  10/24/18 1029     Troponin I <0 02 ng/mL     Comprehensive metabolic panel [[id number]]  (Abnormal) Collected:  10/24/18 0833    Lab Status:  Final result Specimen:  Blood from Arm, Left Updated:  10/24/18 0900     Sodium 136 mmol/L      Potassium 3 9 mmol/L      Chloride 100 mmol/L      CO2 33 (H) mmol/L      ANION GAP 3 (L) mmol/L      BUN 10 mg/dL      Creatinine 0 92 mg/dL      Glucose 139 mg/dL      Calcium 8 8 mg/dL       (H) U/L       (H) U/L      Alkaline Phosphatase 110 U/L      Total Protein 7 5 g/dL      Albumin 3 5 g/dL      Total Bilirubin 1 11 (H) mg/dL      eGFR 107 ml/min/1 73sq m     Narrative:         National Kidney Disease Education Program recommendations are as follows:  GFR calculation is accurate only with a steady state creatinine  Chronic Kidney disease less than 60 ml/min/1 73 sq  meters  Kidney failure less than 15 ml/min/1 73 sq  meters  Lipase [[id number]]  (Normal) Collected:  10/24/18 0833    Lab Status:  Final result Specimen:  Blood from Arm, Left Updated:  10/24/18 0900     Lipase 88 u/L     CBC and differential [[id number]]  (Abnormal) Collected:  10/24/18 0833    Lab Status:  Final result Specimen:  Blood from Arm, Left Updated:  10/24/18 0842     WBC 12 20 (H) Thousand/uL      RBC 5 10 Million/uL      Hemoglobin 15 3 g/dL      Hematocrit 48 1 %      MCV 94 fL      MCH 30 0 pg      MCHC 31 8 g/dL      RDW 13 2 %      MPV 9 3 fL      Platelets 533 Thousands/uL      nRBC 0 /100 WBCs      Neutrophils Relative 78 (H) %      Immat GRANS % 0 %      Lymphocytes Relative 13 (L) %      Monocytes Relative 6 %      Eosinophils Relative 3 %      Basophils Relative 0 %      Neutrophils Absolute 9 50 (H) Thousands/µL      Immature Grans Absolute 0 04 Thousand/uL      Lymphocytes Absolute 1 54 Thousands/µL      Monocytes Absolute 0 77 Thousand/µL      Eosinophils Absolute 0 30 Thousand/µL      Basophils Absolute 0 05 Thousands/µL     POCT urinalysis dipstick [[id number]]     Lab Status:  No result Specimen:  Urine from Urine, Other                  XR cholangiogram intraoperative   Final Result by Sushila Wylie MD (10/24 1557)      Normal caliber CBD with no filling defects  Please see procedure report for further details  There is prompt passage of contrast into the duodenum  Workstation performed: [id number]         XR chest 2 views   Final Result by Kaylie Trent DO (10/24 1122)      No acute cardiopulmonary disease  Workstation performed: [id number]         US gallbladder   Final Result by Sushila Wylie MD (10/24 1107)      1  Cholelithiasis and fundal adenomyomatosis without definite sonographic findings of acute cholecystitis        2  Mildly dilated common bile duct measuring up to 7 mm, similar to prior exams  No discrete choledocholithiasis is identified  Workstation performed: [id number]               Procedures  ECG 12 Lead Documentation  Date/Time: 10/24/2018 10:13 AM  Performed by: Leatha Hayes  Authorized by: Leatha Hayes     ECG reviewed by me, the ED Provider: yes    Patient location:  ED  Previous ECG:     Previous ECG:  Unavailable    Comparison to cardiac monitor: Yes    Interpretation:     Interpretation: non-specific    Rhythm:     Rhythm: sinus rhythm    Ectopy:     Ectopy: none    QRS:     QRS axis:  Normal    QRS intervals: Wide  Conduction:     Conduction: abnormal      Abnormal conduction: complete RBBB    ST segments:     ST segments:  Normal          Phone Consults  ED Phone Contact    ED Course  ED Course as of Oct 24 1710   Wed Oct 24, 2018   1242 Patient seen by surgery, will go to OR                                MDM  Number of Diagnoses or Management Options  Cholelithiasis with choledocholithiasis:   Diagnosis management comments: Patient is 45-year-old male history of diabetes and cholelithiasis presenting with epigastric and right upper quadrant pain  No prior abdominal surgeries  DDx including pancreatitis, biliary colic/cholelithiasis/choledocholithiasis  Patient not complaining of any constitutional symptoms, afebrile  Severe epigastric and right upper quadrant pain on physical exam, abdomen nondistended and not peritoneal   Lipase checked and was normal   AST and ALT mildly elevated  Normal glucose  Formal ultrasound of right upper quadrant demonstrating mildly dilated CBD unchanged from previous examination, no gallbladder thickening, no pericholecystic fluid, stable gallstones  Discussed patient with surgery, patient admitted to surgical service with plans for OR later today         Amount and/or Complexity of Data Reviewed  Clinical lab tests: ordered and reviewed  Tests in the radiology section of CPT®: ordered and reviewed  Tests in the medicine section of CPT®: ordered and reviewed  Obtain history from someone other than the patient: yes  Review and summarize past medical records: yes  Discuss the patient with other providers: yes  Independent visualization of images, tracings, or specimens: yes    Risk of Complications, Morbidity, and/or Mortality  Presenting problems: moderate  Diagnostic procedures: low  Management options: moderate    Patient Progress  Patient progress: stable    CritCare Time    Disposition  Final diagnoses:   Cholelithiasis with choledocholithiasis     Time reflects when diagnosis was documented in both MDM as applicable and the Disposition within this note     Time User Action Codes Description Comment    10/24/2018 11:24 AM Ajay Hurley Add [K80 70] Cholelithiasis with choledocholithiasis     10/24/2018 11:24 AM Ajay Cull Modify [K80 70] Cholelithiasis with choledocholithiasis     10/24/2018 11:24 AM Ajay Hurley Modify [K80 70] Cholelithiasis with choledocholithiasis     10/24/2018  2:19 PM Tessa Mann Modify [K80 70] Cholelithiasis with choledocholithiasis       ED Disposition     None      Follow-up Information    None         Current Discharge Medication List      CONTINUE these medications which have NOT CHANGED    Details   Cetirizine HCl (ZYRTEC ALLERGY PO) Take by mouth as needed      cyclobenzaprine (FLEXERIL) 5 mg tablet Take 1-2 tablets every 8 hr as needed for muscle spasms    Qty: 30 tablet, Refills: 0    Associated Diagnoses: Acute right-sided low back pain without sciatica      DiphenhydrAMINE HCl (BENADRYL ALLERGY PO) Take by mouth as needed      Fexofenadine HCl (ALLEGRA PO) Take 1 tablet by mouth as needed        ibuprofen (MOTRIN) 800 mg tablet Take 1 tablet (800 mg total) by mouth every 6 (six) hours as needed for mild pain or moderate pain  Qty: 30 tablet, Refills: 0    Associated Diagnoses: Acute right-sided low back pain without sciatica      metFORMIN (GLUCOPHAGE) 500 mg tablet Take 1 tablet (500 mg total) by mouth 2 (two) times a day with meals  Qty: 180 tablet, Refills: 1    Associated Diagnoses: Type 2 diabetes mellitus with complication, unspecified whether long term insulin use (HCC)      sildenafil (VIAGRA) 50 MG tablet Take 1 tablet (50 mg total) by mouth daily as needed for erectile dysfunction  Qty: 10 tablet, Refills: 3    Associated Diagnoses: Other male erectile dysfunction      traMADol (ULTRAM) 50 mg tablet Take 1 tablet (50 mg total) by mouth every 8 (eight) hours as needed for moderate pain or severe pain  Qty: 30 tablet, Refills: 0    Associated Diagnoses: Acute right-sided low back pain without sciatica      triamcinolone (KENALOG) 0 1 % cream Apply topically 2 (two) times a day  Qty: 45 g, Refills: 1    Associated Diagnoses: Rash      lisinopril (ZESTRIL) 10 mg tablet Take 1 tablet (10 mg total) by mouth daily  Qty: 90 tablet, Refills: 1    Associated Diagnoses: Type 2 diabetes mellitus without complication, without long-term current use of insulin (HCC)           No discharge procedures on file  ED Provider  Attending physically available and evaluated Genaro Blackwood  NOLA managed the patient along with the ED Attending      Electronically Signed by         Ajit Woodson MD  10/24/18 6062

## 2018-10-24 NOTE — ANESTHESIA PREPROCEDURE EVALUATION
Review of Systems/Medical History  Patient summary reviewed  Chart reviewed  No history of anesthetic complications     Cardiovascular  Hypertension (was taking meds but stopped 3 weeks ago) ,    Pulmonary    Comment: Possible RADHA     GI/Hepatic    GERD (occasional) ,   Comment: Cholelithiasis with choledocolithiasis/Hx biliary colic 5/5188       Comment: Left renal mass     Endo/Other  Diabetes (with circulatory complication/ W1J 5 3 7/1044) type 2 ,   Comment: Right hearing loss Obesity (BMI 49)  morbid obesity   GYN  Negative gynecology ROS          Hematology  Negative hematology ROS      Musculoskeletal  Back pain (HNP last year) ,   Comment: Lumbar spinal stenosis      Neurology  Negative neurology ROS      Psychology   Negative psychology ROS              Physical Exam    Airway    Mallampati score: II  TM Distance: >3 FB       Dental   No notable dental hx     Cardiovascular  Rhythm: regular,     Pulmonary  Breath sounds clear to auscultation,     Other Findings        Anesthesia Plan  ASA Score- 3     Anesthesia Type- general with ASA Monitors  Additional Monitors:   Airway Plan: ETT  Comment: Npo 7am--can of soda  Plan Factors-    Induction- intravenous and rapid sequence induction  Postoperative Plan- Plan for postoperative opioid use  Planned trial extubation    Informed Consent- Anesthetic plan and risks discussed with patient  I personally reviewed this patient with the CRNA  Discussed and agreed on the Anesthesia Plan with the CRNA  Maciel Lund

## 2018-10-24 NOTE — OP NOTE
OPERATIVE REPORT  PATIENT NAME: Robert Estrella    :  1980  MRN: [id number]  Pt Location: [facility] OR ROOM 08    SURGERY DATE: 10/24/2018    Surgeon(s) and Role:     * Janey Villaseñor MD - Primary    Preop Diagnosis:  Cholelithiasis with choledocholithiasis [K80 70]    Post-Op Diagnosis Codes:     * Cholelithiasis with choledocholithiasis [K80 70]    Procedure(s) (LRB):  CHOLANGIOGRAM (N/A)  CHOLECYSTECTOMY LAPAROSCOPIC (N/A)    Specimen(s):  ID Type Source Tests Collected by Time Destination   1 : gallbladder Tissue Gallbladder TISSUE EXAM Umm Barreto MD 10/24/2018 1419        Estimated Blood Loss:   Minimal    Drains:  [REMOVED] NG/OG/Enteral Tube Orogastric 18 Fr Right mouth (Removed)   Number of days: 0       Anesthesia Type:   General    Operative Indications:  Cholelithiasis with choledocholithiasis [K80 70]      Operative Findings:  Inatroperative cholangiogram officially read as: Normal caliber CBD with no filling defects  Critical view obtained prior to clipping cystic duct and artery     Complications:   None    Procedure and Technique:    The patient was seen again in the Holding Room  The risks, benefits, complications, treatment options, and expected outcomes were discussed with the patient  The possibilities of reaction to medication, pulmonary aspiration, perforation of viscus, bleeding, recurrent infection, finding a normal gallbladder, the need for additional procedures, failure to diagnose a condition, the possible need to convert to an open procedure, and creating a complication requiring transfusion or operation were discussed with the patient  The patient and/or family concurred with the proposed plan, giving informed consent  The site of surgery properly noted/marked  The patient was taken to Operating Room, identified as Robert Estrella and the procedure verified as Laparoscopic Cholecystectomy with Intraoperative Cholangiograms   A Time Out was held and the above information confirmed  Prior to the induction of general anesthesia, antibiotic prophylaxis was administered  General endotracheal anesthesia was then administered and tolerated well  After the induction, the abdomen was prepped in the usual sterile fashion  The patient was positioned in the supine position  along with some reverse Trendelenburg  Local anesthetic agent was injected into the skin near the umbilicus and an incision made  The midline fascia was incised and the verass needle was inserted  Pneumoperitoneum was achieved without change int he pateints vital signs  Then, a 5mm port was placed under direct ivsualization with the visiport technique  Additional trocars were introduced under direct vision  All skin incisions were infiltrated with a local anesthetic agent before making the incision and placing the trocars  The gallbladder was identified, the fundus grasped and retracted cephalad  Adhesions were lysed bluntly and with the electrocautery where indicated, taking care not to injure any adjacent organs or viscus  The infundibulum was grasped and retracted laterally, exposing the peritoneum overlying the triangle of Calot  This was then divided and exposed in a blunt fashion  The cystic duct was clearly identified and bluntly dissected circumferentially  The junctions of the gallbladder, cystic duct and common bile duct were clearly identified prior to the division of any linear structure and photo documented  The barker clamp was then placed and the cholangiogram catheter introduced  The catheter was secured using an endoclip  The study showed concern for stricture, therefore glucagon was given intraoperatively and the study was repeated  no stones and good visualization of the distal and proximal biliary tree  The catheter was then removed       The cystic duct was then doubly ligated with surgical clips on the patient side and singly clipped on the gallbladder side and divided  The cystic artery was identified, dissected free, ligated with clips and divided as well  The gallbladder was dissected from the liver bed in retrograde fashion with the electrocautery  The gallbladder was inadvertently entered during the removal  The gallbladder was removed  The liver bed was irrigated and inspected  Hemostasis was achieved with the electrocautery  Copious irrigation was utilized and was repeatedly aspirated until clear all particulate matter  Pneumoperitoneum was completely reduced after viewing removal of the trocars under direct vision  The wound was thoroughly irrigated and the fascia was then closed with a figure of eight suture; the skin was then closed with 4-0 monocryl and a sterile dressing was applied  Instrument, sponge, and needle counts were correct at closure and at the conclusion of the case         Patient Disposition:  PACU     SIGNATURE: Maral Naranjo  DATE: October 24, 2018  TIME: 7:50 PM

## 2018-10-24 NOTE — H&P
H&P Exam - General Surgery   Monica Serrano 41 y o  male MRN: [id number]  Unit/Bed#: ED 01 Encounter: [id number]    Assessment/Plan     Assessment:  61-year-old male with acute cholecystitis and suspicion for choledocholithiasis    Plan:  Plan for laparoscopic cholecystectomy possible open with intraoperative cholangiogram  Risks and benefits were explained to the patient regarding the procedure, he is agreeable to proceeding with the operation and understands risks of bleeding infection damage to surrounding structures requiring need for further operations  Patient is also understanding of the intraoperative cholangiogram in the need for possible endoscopy and ERCP if it is positive  Preoperative antibiotics  NPO patient's last meal was at 2 o'clock this morning      History of Present Illness     HPI:  Monica Serrano is a 41 y o  male who presents with abdominal pain of 1 day duration  The patient has a history of presenting with biliary colic secondary to cholelithiasis and suspicion of choledocholithiasis  Patient was admitted to the hospital in July of this year at which point his T bili was elevated at 1 7  His CBD was noted to be enlarged at 8 mm  The patient had an GI consultation which resulted in an MRCP with no evidence of choledocholithiasis  His pain resolved the patient's left  He failed to follow-up in the office for his suggested laparoscopic cholecystectomy  Repeat LFTs were drawn however in the interim the patient has T bili was noted at 0 44  The patient is presenting today with 1 day of abdominal pain  He had pain yesterday which was around lunchtime associated with some belching and extension  The patient's pain is located in the epigastric right upper quadrant with pain radiating to the shoulder  Patient also reports history of back pain and a pinched nerve  Review of Systems   Constitutional: Negative for chills and fever  HENT: Negative for congestion      Respiratory: Negative for cough and shortness of breath  Cardiovascular: Negative for chest pain  Gastrointestinal: Positive for abdominal pain and nausea  Negative for constipation  Genitourinary: Negative for dysuria  Musculoskeletal: Positive for back pain  Neurological: Negative for dizziness  Psychiatric/Behavioral: Negative for confusion         Historical Information   Past Medical History:   Diagnosis Date    Diabetes mellitus ([facility] )     Diabetes mellitus ([facility] )     other type with circulatory complication, without long-term current    Hearing loss     r ear only    Obesity     Onychomycosis of multiple toenails with type 2 diabetes mellitus ([facility] )     last assessed: 3/21/2017     Past Surgical History:   Procedure Laterality Date    MOUTH SURGERY  12/28/2017    TONSILLECTOMY AND ADENOIDECTOMY       Social History   History   Alcohol Use    Yes     Comment: social     History   Drug Use No     History   Smoking Status    Never Smoker   Smokeless Tobacco    Never Used     Family History: non-contributory    Meds/Allergies   all medications and allergies reviewed  Allergies   Allergen Reactions    Menthol Rash       Objective   First Vitals:   Blood Pressure: 133/75 (10/24/18 0824)  Pulse: 60 (10/24/18 0824)  Temperature: 97 5 °F (36 4 °C) (10/24/18 0824)  Temp Source: Oral (10/24/18 0824)  Respirations: 20 (10/24/18 0824)  Height: 6' (182 9 cm) (10/24/18 0823)  Weight - Scale: (!) 164 kg (362 lb) (10/24/18 0823)  SpO2: 94 % (10/24/18 0824)    Current Vitals:   Blood Pressure: 150/74 (10/24/18 1115)  Pulse: 63 (10/24/18 1115)  Temperature: 97 5 °F (36 4 °C) (10/24/18 0824)  Temp Source: Oral (10/24/18 0824)  Respirations: 16 (10/24/18 1115)  Height: 6' (182 9 cm) (10/24/18 7987)  Weight - Scale: (!) 164 kg (362 lb) (10/24/18 0823)  SpO2: 100 % (10/24/18 1115)    No intake or output data in the 24 hours ending 10/24/18 1236    Invasive Devices     Peripheral Intravenous Line            Peripheral IV 10/24/18 Left Antecubital less than 1 day                Physical Exam   Constitutional: He is oriented to person, place, and time  He appears well-developed and well-nourished  Obese   HENT:   Head: Normocephalic and atraumatic  Eyes: Pupils are equal, round, and reactive to light  Neck: Normal range of motion  Neck supple  Cardiovascular: Normal rate and regular rhythm  Pulmonary/Chest: Effort normal    Abdominal: Soft  Bowel sounds are normal  He exhibits no distension  There is no tenderness  There is no rebound  Tender to deep palpation RUQ  Patient had received dilaudid and is currently not tender   Musculoskeletal: Normal range of motion  Neurological: He is alert and oriented to person, place, and time  Skin: Skin is warm  Lab Results:   I have personally reviewed pertinent lab results  , CBC:   Lab Results   Component Value Date    WBC 12 20 (H) 10/24/2018    HGB 15 3 10/24/2018    HCT 48 1 10/24/2018    MCV 94 10/24/2018     10/24/2018    MCH 30 0 10/24/2018    MCHC 31 8 10/24/2018    RDW 13 2 10/24/2018    MPV 9 3 10/24/2018    NRBC 0 10/24/2018   , CMP:   Lab Results   Component Value Date     10/24/2018    K 3 9 10/24/2018     10/24/2018    CO2 33 (H) 10/24/2018    BUN 10 10/24/2018    CREATININE 0 92 10/24/2018    CALCIUM 8 8 10/24/2018     (H) 10/24/2018     (H) 10/24/2018    ALKPHOS 110 10/24/2018    EGFR 107 10/24/2018   , Coagulation: No results found for: PT, INR, APTT  Imaging: I have personally reviewed pertinent reports  EKG, Pathology, and Other Studies: I have personally reviewed pertinent reports  Code Status: Prior  Advance Directive and Living Will:      Power of :    POLST:      Counseling / Coordination of Care  Total floor / unit time spent today 30 minutes  Greater than 50% of total time was spent with the patient and / or family counseling and / or coordination of care    A description of the counseling / coordination of care: 30

## 2018-10-24 NOTE — DISCHARGE INSTRUCTIONS
Acute Care Surgery Discharge Instructions    Please follow-up as instructed  If you do not already have a follow-up appointment, please call the office when you leave to schedule an appointment to be seen in 2-3 weeks for post-operative re-evaluation  Activity:  - No lifting greater than 10 pounds or strenuous physical activity or exercise for at least 4 weeks  - Walking and normal light activities are encouraged  - Normal daily activities including climbing steps are okay  - No driving until no longer using pain medications  Return to work:    - You may return to work in 2 weeks or sooner if you are feeling well enough  Diet:    - You may resume your normal diet  Wound Care:  - May shower daily  No tub baths or swimming until cleared by your surgeon   - Wash incision gently with soap and water and pat dry  - Do not apply any creams or ointments unless instructed to do so by your surgeon   - Nakul Cuellar may apply ice as needed (no longer than 20 minutes an hour) for the first 48 hours  - Bruising is not unusual and will go away with a little time  You may apply a warm, moist compress that may help the bruising resolve quicker  - You may remove the dressings the day after surgery (unless otherwise instructed)  Leave any skin tapes (steri-strips) on the incision(s) in place until they fall off on their own  Any new dressings are optional     Medications:    - You may resume all of your regular medications, including blood thinners and aspirin, after going home unless otherwise instructed  Please refer to your discharge medication list for further details  - Please take the pain medications as directed  - You are encouraged to use non-narcotic pain medications first and whenever possible  Reserve the use of narcotic pain medication for moderate to severe pain not controlled by non-narcotic medications   - No driving while taking narcotic pain medications    - You may become constipated, especially if taking pain medications  You may take any over the counter stool softeners or laxatives as needed  Examples: Milk of Magnesia, Colace, Senna  Additional Instructions:  - If you have any questions or concerns after discharge please call the office   - Call office or return to ER if fever greater than 101, chills, persistent nausea/vomiting, worsening/uncontrollable pain, and/or increasing redness or purulent/foul smelling drainage from incision(s)

## 2018-10-24 NOTE — ED NOTES
pts o2 decreased to 87% on room air  Pt repositioned in bed, no improvement  Supplemental oxygen applied via simple mask at 3 l/min   Pt refused nasal cannula     Rhina Peter RN  10/24/18 3735

## 2018-10-24 NOTE — ED ATTENDING ATTESTATION
Elayne Nieves MD, saw and evaluated the patient  I have discussed the patient with the resident/non-physician practitioner and agree with the resident's/non-physician practitioner's findings, Plan of Care, and MDM as documented in the resident's/non-physician practitioner's note, except where noted  All available labs and Radiology studies were reviewed  At this point I agree with the current assessment done in the Emergency Department    I have conducted an independent evaluation of this patient a history and physical is as follows:  Acute upper abd pain epigastric   Since this am constant   Known GB disease     Denies cp or sob   No fever  No prior abd surgery   Exam anicteric  Uncomfortable   Lungs clear heart rrr no m abd soft  Tender in ruq no r/g positive Vaughn's  Extremities normal  Impression likely biliary colic versus pancreatitis versus peptic ulcer versus gastritis  Plan:  Ultrasound right upper quadrant labs pain control IV fluids  Critical Care Time  CritCare Time    Procedures

## 2018-10-24 NOTE — ANESTHESIA POSTPROCEDURE EVALUATION
Post-Op Assessment Note      CV Status:  Stable    Mental Status:  Alert and lethargic (arousable )    Hydration Status:  Euvolemic    PONV Controlled:  Controlled    Airway Patency:  Patent    Post Op Vitals Reviewed: Yes          Staff: CRNA, Anesthesiologist       Comments: Pt sleepy, opens eyes to command  No s/s resp distress            /73 (10/24/18 1537)    Temp (!) 97 2 °F (36 2 °C) (10/24/18 1537)    Pulse 74 (10/24/18 1537)   Resp 14 (10/24/18 1537)    SpO2 96 % (10/24/18 1537)

## 2018-10-24 NOTE — PERIOPERATIVE NURSING NOTE
Pt denies pain or nausea, resting quietly, assessment unchanged, report called, no questions, pt transferred to floor via bed

## 2018-10-24 NOTE — PROGRESS NOTES
Patient will fall asleep and oxygen with drop to 82%, patient stated he needs to get a sleep study and believes he has sleep apnea, nasal cannula attempted to be placed on patient, patient ripped it off and refused to wear it, will continue to monitor

## 2018-10-24 NOTE — PROGRESS NOTES
Dr Vitaly Bella at bedside, aware of patient's oxygen saturation when he falls asleep and also aware that patient refuses to wear nasal cannula oxygen  Suggested continuous pulse oximetry for the floor, no new orders received, may transfer patient to P8

## 2018-10-25 ENCOUNTER — APPOINTMENT (OUTPATIENT)
Dept: PHYSICAL THERAPY | Facility: REHABILITATION | Age: 38
End: 2018-10-25
Payer: COMMERCIAL

## 2018-10-25 ENCOUNTER — TRANSITIONAL CARE MANAGEMENT (OUTPATIENT)
Dept: INTERNAL MEDICINE CLINIC | Facility: CLINIC | Age: 38
End: 2018-10-25

## 2018-10-25 NOTE — UTILIZATION REVIEW
Initial Clinical Review    Admission: Date/Time/Statement: 10/24/18 @ 1540     Orders Placed This Encounter   Procedures    Inpatient Admission     Standing Status:   Standing     Number of Occurrences:   1     Order Specific Question:   Admitting Physician     Answer:   Bryant Garcia [[id number]]     Order Specific Question:   Level of Care     Answer:   Med Surg [[id number]]     Order Specific Question:   Estimated length of stay     Answer:   More than 2 Midnights     Order Specific Question:   Certification     Answer:   I certify that inpatient services are medically necessary for this patient for a duration of greater than two midnights  See H&P and MD Progress Notes for additional information about the patient's course of treatment  ED: Date/Time/Mode of Arrival:   ED Arrival Information     Expected Arrival Acuity Means of Arrival Escorted By Service Admission Type    - 10/24/2018 08:16 Urgent Walk-In Self Surgery-General Urgent    Arrival Complaint    abdominal pain          Chief Complaint:   Chief Complaint   Patient presents with    Abdominal Pain     Patient complains of sharp pain in his upper abdomen  Denies any N/V/D       History of Illness: 41 y o  male who presents with abdominal pain of 1 day duration  The patient has a history of presenting with biliary colic secondary to cholelithiasis and suspicion of choledocholithiasis  Patient was admitted to the hospital in July of this year at which point his T bili was elevated at 1 7  His CBD was noted to be enlarged at 8 mm  The patient had an GI consultation which resulted in an MRCP with no evidence of choledocholithiasis  His pain resolved the patient's left  He failed to follow-up in the office for his suggested laparoscopic cholecystectomy  Repeat LFTs were drawn however in the interim the patient has T bili was noted at 0 44  The patient is presenting today with 1 day of abdominal pain    He had pain yesterday which was around lunchtime associated with some belching and extension  The patient's pain is located in the epigastric right upper quadrant with pain radiating to the shoulder  Patient also reports history of back pain and a pinched nerve  ED Vital Signs:   ED Triage Vitals   Temperature Pulse Respirations Blood Pressure SpO2   10/24/18 0824 10/24/18 0824 10/24/18 0824 10/24/18 0824 10/24/18 0824   97 5 °F (36 4 °C) 60 20 133/75 94 %      Temp Source Heart Rate Source Patient Position - Orthostatic VS BP Location FiO2 (%)   10/24/18 0824 10/24/18 0824 10/24/18 0824 10/24/18 0824 --   Oral Monitor Lying Left arm       Pain Score       10/24/18 0823       9        Wt Readings from Last 1 Encounters:   10/24/18 (!) 164 kg (361 lb 8 9 oz)       Vital Signs (abnormal): WNL    Abnormal Labs: Ast = 155  Alt = 174  Wbc = 12 20    Diagnostic Test Results: US Gallbladder - Cholelithiasis and fundal adenomyomatosis without definite sonographic findings of acute cholecystitis  Mildly dilated common bile duct measuring up to 7 mm, similar to prior exams  No discrete choledocholithiasis is identified        ED Treatment:   Medication Administration from 10/24/2018 0816 to 10/24/2018 1313       Date/Time Order Dose Route Action     10/24/2018 0924 sodium chloride 0 9 % bolus 1,000 mL 1,000 mL Intravenous New Bag     10/24/2018 0925 HYDROmorphone (DILAUDID) injection 1 mg 1 mg Intravenous Given          Past Medical/Surgical History:    Active Ambulatory Problems     Diagnosis Date Noted    Type 2 diabetes mellitus ([facility] ) 03/03/2017    Morbid obesity ([facility] ) 03/03/2017    Acute right-sided low back pain without sciatica 03/03/2017    Bone marrow disorder 01/03/2018    Central stenosis of spinal canal 04/13/2017    Elevated blood pressure reading 03/02/2017    Elevated hemoglobin ([facility]) 03/10/2017    Foraminal stenosis of lumbosacral region 03/21/2017    Intertrigo 09/14/2017    Lumbar radiculitis 04/13/2017    Lumbar spinal stenosis 03/21/2017  Positive depression screening 03/21/2017    Weakness of left lower extremity 08/10/2017    Cholelithiasis with choledocholithiasis 04/19/2018    Left renal mass 07/12/2018    Rash 07/19/2018    Other male erectile dysfunction 09/29/2018     Resolved Ambulatory Problems     Diagnosis Date Noted    No Resolved Ambulatory Problems     Past Medical History:   Diagnosis Date    [facility]     [facility]     Hearing loss     Obesity     Onychomycosis of multiple toenails with type 2 diabetes mellitus ([facility] )        Admitting Diagnosis: Abdominal pain [R10 9]  Cholelithiasis with choledocholithiasis [K80 70]    Age/Sex: 41 y o  male    Assessment/Plan:   47y Male to ED with Acute Cholecystitis and suspicion for choledocholithiasis  OR -  Plan for laparoscopic cholecystectomy possible open with intraoperative cholangiogram  NPO    Admission Orders:  10/24 OR - S/P CHOLANGIOGRAM (N/A)  CHOLECYSTECTOMY LAPAROSCOPIC (N/A)    Regular Diet  Up as tolerated  PT/OT eval and treat    Scheduled Meds:   ceFAZolin (ANCEF) IVPB (premix) 2,000 mg  Dose: 2,000 mg  Freq: Once Route: IV x1    ceFAZolin (ANCEF) IVPB (premix) 1,000 mg  Dose: 1,000 mg  Freq: Once Route: IV x1    metroNIDAZOLE (FLAGYL) IVPB (premix) 500 mg  Dose: 500 mg  Freq:  Once Route: IV x1  Start: 10/24/18 1300 End: 10/24/18 1330      Continuous Infusions:  lactated ringers infusion  Rate: 50 mL/hr Dose: 50 mL/hr  Freq: Continuous Route: IV  Last Dose: 50 mL/hr (10/24/18 9966)  Start: 10/24/18 1545 End: 10/24/18 2237    sodium chloride 0 9 % infusion  Rate: 125 mL/hr Dose: 125 mL/hr  Freq: Continuous Route: IV  Last Dose: Stopped (10/24/18 7509)               PRN Meds:   Oxycodone po x1

## 2018-10-26 ENCOUNTER — APPOINTMENT (OUTPATIENT)
Dept: PHYSICAL THERAPY | Facility: REHABILITATION | Age: 38
End: 2018-10-26
Payer: COMMERCIAL

## 2018-11-02 ENCOUNTER — OFFICE VISIT (OUTPATIENT)
Dept: PHYSICAL THERAPY | Facility: REHABILITATION | Age: 38
End: 2018-11-02
Payer: COMMERCIAL

## 2018-11-02 DIAGNOSIS — M54.50 ACUTE RIGHT-SIDED LOW BACK PAIN WITHOUT SCIATICA: Primary | ICD-10-CM

## 2018-11-02 PROCEDURE — 97140 MANUAL THERAPY 1/> REGIONS: CPT

## 2018-11-02 PROCEDURE — 97112 NEUROMUSCULAR REEDUCATION: CPT

## 2018-11-02 NOTE — PROGRESS NOTES
Daily Note     Today's date: 2018  Patient name: Camryn Ramírez  : 1980  MRN: [id number]  Referring provider: Thais Miller MD  Dx:   Encounter Diagnosis     ICD-10-CM    1  Acute right-sided low back pain without sciatica M54 5                   Subjective: Pt reports that LB & leg have been much better  He is recovering from gall bladder removal yet but he is feeling pretty good  Objective: See treatment diary below      Assessment:  Ex to alfie with gall bladder recovery  Has some LBP with ext  Has some nerve tension yet     Some abdominal soreness after ex  He will modify ex if needed    Plan: Continue per plan of care  Progress treatment as tolerated          Precautions: DM    Daily Treatment Diary     Manual  10/12 10/15 11-2          PA lumbar Gr 4 x x                                                                  Exercise Diary              Seated nerve sliders 10 ea x tension          Seated hamstring stretch 20" 3x x x          Standing extension 10 x x           Standing calf stretch   3x30s x          TB side step   2 laps blue 3 laps          bridges  3x10 x          Clams R/L   3x10 x          squat   30x          Quad LE    10x          BW lunge   10x                                    Body mechanics instr 5 min                                                                                                           Modalities

## 2018-11-06 ENCOUNTER — OFFICE VISIT (OUTPATIENT)
Dept: PHYSICAL THERAPY | Facility: REHABILITATION | Age: 38
End: 2018-11-06
Payer: COMMERCIAL

## 2018-11-06 DIAGNOSIS — M54.50 ACUTE RIGHT-SIDED LOW BACK PAIN WITHOUT SCIATICA: Primary | ICD-10-CM

## 2018-11-06 PROCEDURE — 97112 NEUROMUSCULAR REEDUCATION: CPT

## 2018-11-06 NOTE — PROGRESS NOTES
Daily Note     Today's date: 2018  Patient name: Camryn Ramírez  : 1980  MRN: [id number]  Referring provider: Thais Miller MD  Dx:   Encounter Diagnosis     ICD-10-CM    1  Acute right-sided low back pain without sciatica M54 5                   Subjective:  Overall LB has been feeling pretty good  Did not get too sore in abdomin with ex, quads were sore      Objective: See treatment diary below      Assessment:  Doing well  Cont to progress as alfie    Plan: Continue per plan of care  Progress treatment as tolerated          Precautions: DM    Daily Treatment Diary     Manual  10/12 10/15 11-2 11-6         PA lumbar Gr 4 x x                                                                  Exercise Diary              Seated nerve sliders 10 ea x tension x         Seated hamstring stretch 20" 3x x x Supine w strap x         Standing extension 10 x x x          Standing calf stretch   3x30s x x         TB side step   2 laps blue 3 laps x         bridges  3x10 x x         Clams R/L   3x10 x x         squat   30x x         Quad LE    10x x         BW lunge   10x x         TM    2 0 MPH 10'         SL HR    2x10         Body mechanics instr 5 min                                                                                                           Modalities

## 2018-11-08 ENCOUNTER — OFFICE VISIT (OUTPATIENT)
Dept: SURGERY | Facility: CLINIC | Age: 38
End: 2018-11-08

## 2018-11-08 VITALS — TEMPERATURE: 97.3 F | BODY MASS INDEX: 42.66 KG/M2 | HEIGHT: 72 IN | WEIGHT: 315 LBS

## 2018-11-08 DIAGNOSIS — K80.00 ACUTE CHOLECYSTITIS DUE TO BILIARY CALCULUS: Primary | ICD-10-CM

## 2018-11-08 PROCEDURE — 99024 POSTOP FOLLOW-UP VISIT: CPT | Performed by: SURGERY

## 2018-11-08 NOTE — PROGRESS NOTES
Office Visit - General Surgery  Shadi Martins MRN: [id number]  Encounter: [id number]    Assessment and Plan    Problem List Items Addressed This Visit     None          Chief Complaint:  Shadi Martins is a 41 y o  male who presents for Post-op (p/o lap isauro)    Subjective      Past Medical History  Past Medical History:   Diagnosis Date    [facility]     Diabetes mellitus ([facility] )     other type with circulatory complication, without long-term current    Hearing loss     r ear only    Obesity     Onychomycosis of multiple toenails with type 2 diabetes mellitus ([facility] )     last assessed: 3/21/2017       Past Surgical History  Past Surgical History:   Procedure Laterality Date    CHOLANGIOGRAM N/A 10/24/2018    Procedure: CHOLANGIOGRAM;  Surgeon: Tylor Nash MD;  Location: [facility];  Service: General    CHOLECYSTECTOMY LAPAROSCOPIC N/A 10/24/2018    Procedure: CHOLECYSTECTOMY LAPAROSCOPIC;  Surgeon: Tylor Nash MD;  Location: [facility];  Service: General    MOUTH SURGERY  12/28/2017    TONSILLECTOMY AND ADENOIDECTOMY         Family History  Family History   Problem Relation Age of Onset    Diabetes Mother     Stomach cancer Other        Medications  Current Outpatient Prescriptions on File Prior to Visit   Medication Sig Dispense Refill    Cetirizine HCl (ZYRTEC ALLERGY PO) Take by mouth as needed      cyclobenzaprine (FLEXERIL) 5 mg tablet Take 1-2 tablets every 8 hr as needed for muscle spasms   30 tablet 0    DiphenhydrAMINE HCl (BENADRYL ALLERGY PO) Take by mouth as needed      Fexofenadine HCl (ALLEGRA PO) Take 1 tablet by mouth as needed        lisinopril (ZESTRIL) 10 mg tablet Take 1 tablet (10 mg total) by mouth daily 90 tablet 1    metFORMIN (GLUCOPHAGE) 500 mg tablet Take 1 tablet (500 mg total) by mouth 2 (two) times a day with meals 180 tablet 1    sildenafil (VIAGRA) 50 MG tablet Take 1 tablet (50 mg total) by mouth daily as needed for erectile dysfunction 10 tablet 3    traMADol (ULTRAM) 50 mg tablet Take 1 tablet (50 mg total) by mouth every 8 (eight) hours as needed for moderate pain or severe pain 30 tablet 0    docusate sodium (COLACE) 100 mg capsule Take 1 capsule (100 mg total) by mouth 2 (two) times a day (Patient not taking: Reported on 11/8/2018 ) 30 capsule 0    ibuprofen (MOTRIN) 800 mg tablet Take 1 tablet (800 mg total) by mouth every 6 (six) hours as needed for mild pain or moderate pain (Patient not taking: Reported on 11/8/2018 ) 30 tablet 0    triamcinolone (KENALOG) 0 1 % cream Apply topically 2 (two) times a day (Patient not taking: Reported on 11/8/2018 ) 45 g 1     No current facility-administered medications on file prior to visit          Allergies  Allergies   Allergen Reactions    Menthol Rash       Review of Systems    Objective  Vitals:    11/08/18 0919   Temp: (!) 97 3 °F (36 3 °C)       Physical Exam

## 2018-11-09 ENCOUNTER — OFFICE VISIT (OUTPATIENT)
Dept: PHYSICAL THERAPY | Facility: REHABILITATION | Age: 38
End: 2018-11-09
Payer: COMMERCIAL

## 2018-11-09 DIAGNOSIS — M54.50 ACUTE RIGHT-SIDED LOW BACK PAIN WITHOUT SCIATICA: Primary | ICD-10-CM

## 2018-11-09 PROCEDURE — G8991 OTHER PT/OT GOAL STATUS: HCPCS

## 2018-11-09 PROCEDURE — 97112 NEUROMUSCULAR REEDUCATION: CPT

## 2018-11-09 PROCEDURE — G8990 OTHER PT/OT CURRENT STATUS: HCPCS

## 2018-11-09 NOTE — PROGRESS NOTES
Daily Note     Today's date: 2018  Patient name: Benito Bender  : 1980  MRN: [id number]  Referring provider: Thea Eldridge MD  Dx:   Encounter Diagnosis     ICD-10-CM    1  Acute right-sided low back pain without sciatica M54 5                   Subjective:  Reports surgeon released him  He bowled 3 games & just had minimal soreness at 1 surgical port scar   No c/o LBP      Objective: See treatment diary below      Assessment:  Doing well  He feels ready to transition to fitness    Plan: D/C to fitness      Precautions: DM    Daily Treatment Diary     Manual  10/12 10/15 11-2 11-6 11-9        PA lumbar Gr 4 x x                                                                  Exercise Diary              Seated nerve sliders 10 ea x tension x x        Seated hamstring stretch 20" 3x x x Supine w strap x x        Standing extension 10 x x x x         Standing calf stretch   3x30s x x x        TB side step   2 laps blue 3 laps x x        bridges  3x10 x x x        Clams R/L   3x10 x x Gr 20x        squat   30x x x        Quad LE    10x x x        BW lunge   10x x         TM    2 0 MPH 10' 2  5MPH 10'        SL HR    2x10 x        Body mechanics instr 5 min                                                                                                           Modalities

## 2018-11-13 ENCOUNTER — APPOINTMENT (OUTPATIENT)
Dept: PHYSICAL THERAPY | Facility: REHABILITATION | Age: 38
End: 2018-11-13
Payer: COMMERCIAL

## 2018-11-16 ENCOUNTER — APPOINTMENT (OUTPATIENT)
Dept: PHYSICAL THERAPY | Facility: REHABILITATION | Age: 38
End: 2018-11-16
Payer: COMMERCIAL

## 2018-11-21 ENCOUNTER — CLINICAL SUPPORT (OUTPATIENT)
Dept: INTERNAL MEDICINE CLINIC | Facility: CLINIC | Age: 38
End: 2018-11-21
Payer: COMMERCIAL

## 2018-11-21 DIAGNOSIS — E11.9 TYPE 2 DIABETES MELLITUS WITHOUT COMPLICATION, WITHOUT LONG-TERM CURRENT USE OF INSULIN (HCC): Primary | ICD-10-CM

## 2018-11-21 PROCEDURE — 36415 COLL VENOUS BLD VENIPUNCTURE: CPT

## 2018-11-22 LAB
ALBUMIN SERPL-MCNC: 3.9 G/DL (ref 3.5–5.5)
ALBUMIN/GLOB SERPL: 1.3 {RATIO} (ref 1.2–2.2)
ALP SERPL-CCNC: 81 IU/L (ref 39–117)
ALT SERPL-CCNC: 42 IU/L (ref 0–44)
AST SERPL-CCNC: 25 IU/L (ref 0–40)
BASOPHILS # BLD AUTO: 0 X10E3/UL (ref 0–0.2)
BASOPHILS NFR BLD AUTO: 0 %
BILIRUB SERPL-MCNC: 0.4 MG/DL (ref 0–1.2)
BUN SERPL-MCNC: 15 MG/DL (ref 6–20)
BUN/CREAT SERPL: 17 (ref 9–20)
CALCIUM SERPL-MCNC: 9.1 MG/DL (ref 8.7–10.2)
CHLORIDE SERPL-SCNC: 100 MMOL/L (ref 96–106)
CO2 SERPL-SCNC: 29 MMOL/L (ref 20–29)
CREAT SERPL-MCNC: 0.88 MG/DL (ref 0.76–1.27)
EOSINOPHIL # BLD AUTO: 0.5 X10E3/UL (ref 0–0.4)
EOSINOPHIL NFR BLD AUTO: 5 %
ERYTHROCYTE [DISTWIDTH] IN BLOOD BY AUTOMATED COUNT: 13.8 % (ref 12.3–15.4)
EST. AVERAGE GLUCOSE BLD GHB EST-MCNC: 108 MG/DL
GLOBULIN SER-MCNC: 3 G/DL (ref 1.5–4.5)
GLUCOSE SERPL-MCNC: 106 MG/DL (ref 65–99)
HBA1C MFR BLD: 5.4 % (ref 4.8–5.6)
HCT VFR BLD AUTO: 44.1 % (ref 37.5–51)
HGB BLD-MCNC: 14.5 G/DL (ref 13–17.7)
IMM GRANULOCYTES # BLD: 0 X10E3/UL (ref 0–0.1)
IMM GRANULOCYTES NFR BLD: 0 %
LYMPHOCYTES # BLD AUTO: 1.9 X10E3/UL (ref 0.7–3.1)
LYMPHOCYTES NFR BLD AUTO: 20 %
MCH RBC QN AUTO: 30.5 PG (ref 26.6–33)
MCHC RBC AUTO-ENTMCNC: 32.9 G/DL (ref 31.5–35.7)
MCV RBC AUTO: 93 FL (ref 79–97)
MONOCYTES # BLD AUTO: 0.6 X10E3/UL (ref 0.1–0.9)
MONOCYTES NFR BLD AUTO: 7 %
NEUTROPHILS # BLD AUTO: 6.3 X10E3/UL (ref 1.4–7)
NEUTROPHILS NFR BLD AUTO: 68 %
PLATELET # BLD AUTO: 259 X10E3/UL (ref 150–379)
POTASSIUM SERPL-SCNC: 4.5 MMOL/L (ref 3.5–5.2)
PROT SERPL-MCNC: 6.9 G/DL (ref 6–8.5)
RBC # BLD AUTO: 4.76 X10E6/UL (ref 4.14–5.8)
SL AMB EGFR AFRICAN AMERICAN: 128 ML/MIN/1.73
SL AMB EGFR NON AFRICAN AMERICAN: 111 ML/MIN/1.73
SODIUM SERPL-SCNC: 143 MMOL/L (ref 134–144)
WBC # BLD AUTO: 9.3 X10E3/UL (ref 3.4–10.8)

## 2018-12-19 ENCOUNTER — OFFICE VISIT (OUTPATIENT)
Dept: INTERNAL MEDICINE CLINIC | Facility: CLINIC | Age: 38
End: 2018-12-19
Payer: COMMERCIAL

## 2018-12-19 VITALS
HEIGHT: 73 IN | WEIGHT: 315 LBS | TEMPERATURE: 97.6 F | OXYGEN SATURATION: 95 % | HEART RATE: 72 BPM | DIASTOLIC BLOOD PRESSURE: 80 MMHG | SYSTOLIC BLOOD PRESSURE: 140 MMHG | BODY MASS INDEX: 41.75 KG/M2

## 2018-12-19 DIAGNOSIS — I10 ESSENTIAL HYPERTENSION: ICD-10-CM

## 2018-12-19 DIAGNOSIS — E11.9 TYPE 2 DIABETES MELLITUS WITHOUT COMPLICATION, WITHOUT LONG-TERM CURRENT USE OF INSULIN (HCC): Chronic | ICD-10-CM

## 2018-12-19 DIAGNOSIS — D58.2 ELEVATED HEMOGLOBIN (HCC): ICD-10-CM

## 2018-12-19 DIAGNOSIS — E66.01 MORBID OBESITY (HCC): Chronic | ICD-10-CM

## 2018-12-19 DIAGNOSIS — N52.8 OTHER MALE ERECTILE DYSFUNCTION: ICD-10-CM

## 2018-12-19 DIAGNOSIS — Z90.49 STATUS POST CHOLECYSTECTOMY: Primary | ICD-10-CM

## 2018-12-19 PROBLEM — R21 RASH: Status: RESOLVED | Noted: 2018-07-19 | Resolved: 2018-12-19

## 2018-12-19 PROBLEM — R03.0 ELEVATED BLOOD PRESSURE READING: Status: RESOLVED | Noted: 2017-03-02 | Resolved: 2018-12-19

## 2018-12-19 PROBLEM — L30.4 INTERTRIGO: Status: RESOLVED | Noted: 2017-09-14 | Resolved: 2018-12-19

## 2018-12-19 PROBLEM — K80.70 CHOLELITHIASIS WITH CHOLEDOCHOLITHIASIS: Status: RESOLVED | Noted: 2018-04-19 | Resolved: 2018-12-19

## 2018-12-19 PROBLEM — M54.50 ACUTE RIGHT-SIDED LOW BACK PAIN WITHOUT SCIATICA: Status: RESOLVED | Noted: 2017-03-03 | Resolved: 2018-12-19

## 2018-12-19 PROCEDURE — 3008F BODY MASS INDEX DOCD: CPT | Performed by: INTERNAL MEDICINE

## 2018-12-19 PROCEDURE — 99214 OFFICE O/P EST MOD 30 MIN: CPT | Performed by: INTERNAL MEDICINE

## 2018-12-19 PROCEDURE — 4010F ACE/ARB THERAPY RXD/TAKEN: CPT | Performed by: INTERNAL MEDICINE

## 2018-12-19 PROCEDURE — 1036F TOBACCO NON-USER: CPT | Performed by: INTERNAL MEDICINE

## 2018-12-19 RX ORDER — LOSARTAN POTASSIUM 50 MG/1
25 TABLET ORAL DAILY
Qty: 30 TABLET | Refills: 3 | Status: SHIPPED | OUTPATIENT
Start: 2018-12-19 | End: 2019-07-12

## 2018-12-19 NOTE — PROGRESS NOTES
Assessment/Plan:    Type 2 diabetes mellitus ([facility] )  Lab Results   Component Value Date    HGBA1C 5 4 11/21/2018      well controlled with current regimen, continue with metformin 500 mg daily  Discussed starting a diet, exercise and weight loss plan come the year  Essential hypertension  Discontinue lisinopril 10 mg due to potential side effects  Will start losartan 25 mg daily  He is to contact office for worsening symptoms  Morbid obesity ([place] [id number] )  He is to start an exercise, diet, and weight loss plan starting the new year  Elevated hemoglobin (HCC)  Stable at most recent CBC  Continue to monitor  Status post cholecystectomy  Stable  Continue to monitor clinically  Other male erectile dysfunction  Continue with as needed sildenafil  Diagnoses and all orders for this visit:    Status post cholecystectomy    Type 2 diabetes mellitus without complication, without long-term current use of insulin ([place] [id number] )    Morbid obesity ([place] [id number] )  -     Ambulatory referral to Weight Management; Future    Essential hypertension  -     losartan (COZAAR) 50 mg tablet; Take 0 5 tablets (25 mg total) by mouth daily    Elevated hemoglobin (HCC)    Other male erectile dysfunction          Subjective:      Patient ID: Hansel Lawrence is a 41 y o  male  41year old male is seen today for follow up of chronic conditions  He underwent cholecystectomy due to cholecystitis secondary to cholelithiasis with choledocholithiasis in 10/2018  He also reports having symptoms of his hands getting cold while playing video games  He denies any similar symptoms to remaining extremities  In reviewing laboratory studies, CBC, CMP and A1c are within normal range  Hemoglobin A1c is 5 4%  He has been compliant with metformin 500 mg twice a day  He is planning on starting a diet and exercise on January 1  Diabetes   He presents for his follow-up diabetic visit  He has type 2 diabetes mellitus   There are no hypoglycemic associated symptoms  Pertinent negatives for hypoglycemia include no dizziness or headaches  There are no diabetic associated symptoms  Pertinent negatives for diabetes include no chest pain, no fatigue and no weakness  There are no hypoglycemic complications  There are no diabetic complications  Risk factors for coronary artery disease include hypertension, male sex, diabetes mellitus and obesity  Current diabetic treatment includes oral agent (monotherapy)  He is compliant with treatment all of the time  He rarely participates in exercise  An ACE inhibitor/angiotensin II receptor blocker is being taken  He does not see a podiatrist Eye exam is not current  Hypertension   This is a chronic problem  The current episode started more than 1 year ago  The problem is unchanged  The problem is uncontrolled  Pertinent negatives include no chest pain, headaches, palpitations or shortness of breath  Past treatments include ACE inhibitors  The current treatment provides moderate improvement  Compliance problems include exercise and diet  The following portions of the patient's history were reviewed and updated as appropriate: allergies, current medications, past family history, past medical history, past social history, past surgical history and problem list     Review of Systems   Constitutional: Negative for activity change, appetite change, chills, diaphoresis, fatigue and fever  HENT: Negative for congestion, postnasal drip, rhinorrhea, sinus pain, sinus pressure, sneezing and sore throat  Eyes: Negative for visual disturbance  Respiratory: Negative for apnea, cough, choking, chest tightness, shortness of breath and wheezing  Cardiovascular: Negative for chest pain, palpitations and leg swelling  Gastrointestinal: Negative for abdominal distention, abdominal pain, anal bleeding, blood in stool, constipation, diarrhea, nausea and vomiting     Endocrine: Negative for cold intolerance and heat intolerance  Genitourinary: Negative for difficulty urinating, dysuria and hematuria  Musculoskeletal: Positive for back pain (chronic)  Skin: Negative  Neurological: Negative for dizziness, weakness, light-headedness, numbness and headaches  Hematological: Negative for adenopathy  Psychiatric/Behavioral: Negative for agitation, sleep disturbance and suicidal ideas  All other systems reviewed and are negative  Past Medical History:   Diagnosis Date    Diabetes mellitus ([facility] )     Diabetes mellitus ([place] [address] )     other type with circulatory complication, without long-term current    Essential hypertension 12/19/2018    Hearing loss     r ear only    Obesity     Onychomycosis of multiple toenails with type 2 diabetes mellitus ([facility] )     last assessed: 3/21/2017         Current Outpatient Prescriptions:     Cetirizine HCl (ZYRTEC ALLERGY PO), Take by mouth as needed, Disp: , Rfl:     cyclobenzaprine (FLEXERIL) 5 mg tablet, Take 1-2 tablets every 8 hr as needed for muscle spasms  , Disp: 30 tablet, Rfl: 0    DiphenhydrAMINE HCl (BENADRYL ALLERGY PO), Take by mouth as needed, Disp: , Rfl:     docusate sodium (COLACE) 100 mg capsule, Take 1 capsule (100 mg total) by mouth 2 (two) times a day, Disp: 30 capsule, Rfl: 0    Fexofenadine HCl (ALLEGRA PO), Take 1 tablet by mouth as needed  , Disp: , Rfl:     ibuprofen (MOTRIN) 800 mg tablet, Take 1 tablet (800 mg total) by mouth every 6 (six) hours as needed for mild pain or moderate pain, Disp: 30 tablet, Rfl: 0    metFORMIN (GLUCOPHAGE) 500 mg tablet, Take 1 tablet (500 mg total) by mouth 2 (two) times a day with meals, Disp: 180 tablet, Rfl: 1    sildenafil (VIAGRA) 50 MG tablet, Take 1 tablet (50 mg total) by mouth daily as needed for erectile dysfunction, Disp: 10 tablet, Rfl: 3    traMADol (ULTRAM) 50 mg tablet, Take 1 tablet (50 mg total) by mouth every 8 (eight) hours as needed for moderate pain or severe pain, Disp: 30 tablet, Rfl: 0   triamcinolone (KENALOG) 0 1 % cream, Apply topically 2 (two) times a day, Disp: 45 g, Rfl: 1    losartan (COZAAR) 50 mg tablet, Take 0 5 tablets (25 mg total) by mouth daily, Disp: 30 tablet, Rfl: 3    Allergies   Allergen Reactions    Menthol Rash       Social History   Past Surgical History:   Procedure Laterality Date    CHOLANGIOGRAM N/A 10/24/2018    Procedure: CHOLANGIOGRAM;  Surgeon: Mariaa Aragon MD;  Location: [facility];  Service: General    CHOLECYSTECTOMY      CHOLECYSTECTOMY LAPAROSCOPIC N/A 10/24/2018    Procedure: CHOLECYSTECTOMY LAPAROSCOPIC;  Surgeon: Mariaa Aragon MD;  Location: [facility];  Service: General    MOUTH SURGERY  12/28/2017    TONSILLECTOMY AND ADENOIDECTOMY       Family History   Problem Relation Age of Onset    Diabetes Mother     Stomach cancer Other        Objective:  /80 (BP Location: Left arm, Patient Position: Sitting, Cuff Size: Large)   Pulse 72   Temp 97 6 °F (36 4 °C) (Oral)   Ht 6' 0 5" (1 842 m)   Wt (!) 171 kg (377 lb 9 6 oz)   SpO2 95%   BMI 50 51 kg/m²     Recent Results (from the past 1344 hour(s))   CBC and differential    Collection Time: 11/21/18  1:51 PM   Result Value Ref Range    White Blood Cell Count 9 3 3 4 - 10 8 x10E3/uL    Red Blood Cell Count 4 76 4 14 - 5 80 x10E6/uL    Hemoglobin 14 5 13 0 - 17 7 g/dL    HCT 44 1 37 5 - 51 0 %    MCV 93 79 - 97 fL    MCH 30 5 26 6 - 33 0 pg    MCHC 32 9 31 5 - 35 7 g/dL    RDW 13 8 12 3 - 15 4 %    Platelet Count 371 738 - 379 x10E3/uL    Neutrophils 68 Not Estab  %    Lymphocytes 20 Not Estab  %    Monocytes 7 Not Estab  %    Eosinophils 5 Not Estab  %    Basophils PCT 0 Not Estab  %    Neutrophils (Absolute) 6 3 1 4 - 7 0 x10E3/uL    Lymphocytes (Absolute) 1 9 0 7 - 3 1 x10E3/uL    Monocytes (Absolute) 0 6 0 1 - 0 9 x10E3/uL    Eosinophils (Absolute) 0 5 (H) 0 0 - 0 4 x10E3/uL    Basophils ABS 0 0 0 0 - 0 2 x10E3/uL    Immature Granulocytes 0 Not Estab  %    Immature Granulocytes (Absolute) 0 0 0 0 - 0 1 x10E3/uL   Comprehensive metabolic panel    Collection Time: 11/21/18  1:51 PM   Result Value Ref Range    Glucose, Random 106 (H) 65 - 99 mg/dL    BUN 15 6 - 20 mg/dL    Creatinine 0 88 0 76 - 1 27 mg/dL    eGFR Non  111 >59 mL/min/1 73    SL AMB EGFR AFRICAN AMERICAN 128 >59 mL/min/1 73    SL AMB BUN/CREATININE RATIO 17 9 - 20    Sodium 143 134 - 144 mmol/L    Potassium 4 5 3 5 - 5 2 mmol/L    Chloride 100 96 - 106 mmol/L    CO2 29 20 - 29 mmol/L    CALCIUM 9 1 8 7 - 10 2 mg/dL    SL AMB PROTEIN, TOTAL 6 9 6 0 - 8 5 g/dL    Albumin 3 9 3 5 - 5 5 g/dL    Globulin, Total 3 0 1 5 - 4 5 g/dL    Albumin/Globulin Ratio 1 3 1 2 - 2 2    TOTAL BILIRUBIN 0 4 0 0 - 1 2 mg/dL    Alk Phos Isoenzymes 81 39 - 117 IU/L    SL AMB AST 25 0 - 40 IU/L    SL AMB ALT 42 0 - 44 IU/L   Hemoglobin A1C    Collection Time: 11/21/18  1:51 PM   Result Value Ref Range    Hemoglobin A1C 5 4 4 8 - 5 6 %    Estimated Average Glucose 108 mg/dL            Physical Exam   Constitutional: He is oriented to person, place, and time  He appears well-developed and well-nourished  No distress  HENT:   Head: Normocephalic and atraumatic  Eyes: Pupils are equal, round, and reactive to light  Conjunctivae and EOM are normal  Right eye exhibits no discharge  Left eye exhibits no discharge  No scleral icterus  Neck: Normal range of motion  Neck supple  No JVD present  No thyromegaly present  Cardiovascular: Normal rate, regular rhythm, normal heart sounds and intact distal pulses  Exam reveals no gallop and no friction rub  No murmur heard  Pulmonary/Chest: Effort normal and breath sounds normal  No respiratory distress  He has no wheezes  He has no rales  He exhibits no tenderness  Abdominal: Soft  Bowel sounds are normal  He exhibits no distension and no mass  There is no tenderness  There is no rebound and no guarding  Musculoskeletal: Normal range of motion   He exhibits no edema, tenderness or deformity  Lymphadenopathy:     He has no cervical adenopathy  Neurological: He is alert and oriented to person, place, and time  He has normal reflexes  No cranial nerve deficit  Coordination normal    Skin: Skin is warm and dry  No rash noted  He is not diaphoretic  No erythema  No pallor  Psychiatric: He has a normal mood and affect  His behavior is normal  Judgment and thought content normal    Nursing note and vitals reviewed  BMI Counseling: Body mass index is 50 51 kg/m²  Discussed the patient's BMI with him  The BMI is above average  BMI counseling and education was provided to the patient  Nutrition recommendations include reducing portion sizes, decreasing overall calorie intake, 3-5 servings of fruits/vegetables daily, reducing fast food intake, consuming healthier snacks, decreasing soda and/or juice intake, moderation in carbohydrate intake, increasing intake of lean protein, reducing intake of saturated fat and trans fat and reducing intake of cholesterol  Exercise recommendations include moderate aerobic physical activity for 150 minutes/week  Referral to weight management was provided to the patient

## 2018-12-19 NOTE — PATIENT INSTRUCTIONS
Obesity   AMBULATORY CARE:   Obesity  is when your body mass index (BMI) is greater than 30  Your healthcare provider will use your height and weight to measure your BMI  The risks of obesity include  many health problems, such as injuries or physical disability  You may need tests to check for the following:  · Diabetes     · High blood pressure or high cholesterol     · Heart disease     · Gallbladder or liver disease     · Cancer of the colon, breast, prostate, liver, or kidney     · Sleep apnea     · Arthritis or gout  Seek care immediately if:   · You have a severe headache, confusion, or difficulty speaking  · You have weakness on one side of your body  · You have chest pain, sweating, or shortness of breath  Contact your healthcare provider if:   · You have symptoms of gallbladder or liver disease, such as pain in your upper abdomen  · You have knee or hip pain and discomfort while walking  · You have symptoms of diabetes, such as intense hunger and thirst, and frequent urination  · You have symptoms of sleep apnea, such as snoring or daytime sleepiness  · You have questions or concerns about your condition or care  Treatment for obesity  focuses on helping you lose weight to improve your health  Even a small decrease in BMI can reduce the risk for many health problems  Your healthcare provider will help you set a weight-loss goal   · Lifestyle changes  are the first step in treating obesity  These include making healthy food choices and getting regular physical activity  Your healthcare provider may suggest a weight-loss program that involves coaching, education, and therapy  · Medicine  may help you lose weight when it is used with a healthy diet and physical activity  · Surgery  can help you lose weight if you are very obese and have other health problems  There are several types of weight-loss surgery  Ask your healthcare provider for more information    Be successful losing weight:   · Set small, realistic goals  An example of a small goal is to walk for 20 minutes 5 days a week  Anther goal is to lose 5% of your body weight  · Tell friends, family members, and coworkers about your goals  and ask for their support  Ask a friend to lose weight with you, or join a weight-loss support group  · Identify foods or triggers that may cause you to overeat , and find ways to avoid them  Remove tempting high-calorie foods from your home and workplace  Place a bowl of fresh fruit on your kitchen counter  If stress causes you to eat, then find other ways to cope with stress  · Keep a diary to track what you eat and drink  Also write down how many minutes of physical activity you do each day  Weigh yourself once a week and record it in your diary  Eating changes: You will need to eat 500 to 1,000 fewer calories each day than you currently eat to lose 1 to 2 pounds a week  The following changes will help you cut calories:  · Eat smaller portions  Use small plates, no larger than 9 inches in diameter  Fill your plate half full of fruits and vegetables  Measure your food using measuring cups until you know what a serving size looks like  · Eat 3 meals and 1 or 2 snacks each day  Plan your meals in advance  Nicole Commander and eat at home most of the time  Eat slowly  · Eat fruits and vegetables at every meal   They are low in calories and high in fiber, which makes you feel full  Do not add butter, margarine, or cream sauce to vegetables  Use herbs to season steamed vegetables  · Eat less fat and fewer fried foods  Eat more baked or grilled chicken and fish  These protein sources are lower in calories and fat than red meat  Limit fast food  Dress your salads with olive oil and vinegar instead of bottled dressing  · Limit the amount of sugar you eat  Do not drink sugary beverages  Limit alcohol  Activity changes:  Physical activity is good for your body in many ways   It helps you burn calories and build strong muscles  It decreases stress and depression, and improves your mood  It can also help you sleep better  Talk to your healthcare provider before you begin an exercise program   · Exercise for at least 30 minutes 5 days a week  Start slowly  Set aside time each day for physical activity that you enjoy and that is convenient for you  It is best to do both weight training and an activity that increases your heart rate, such as walking, bicycling, or swimming  · Find ways to be more active  Do yard work and housecleaning  Walk up the stairs instead of using elevators  Spend your leisure time going to events that require walking, such as outdoor festivals or fairs  This extra physical activity can help you lose weight and keep it off  Follow up with your healthcare provider as directed: You may need to meet with a dietitian  Write down your questions so you remember to ask them during your visits  © 2017 [address] [employer] Information is for End User's use only and may not be sold, redistributed or otherwise used for commercial purposes  All illustrations and images included in CareNotes® are the copyrighted property of [employer]  or Kalyan Thayer  The above information is an  only  It is not intended as medical advice for individual conditions or treatments  Talk to your doctor, nurse or pharmacist before following any medical regimen to see if it is safe and effective for you  Weight Management   AMBULATORY CARE:   Why it is important to manage your weight:  Being overweight increases your risk of health conditions such as heart disease, high blood pressure, type 2 diabetes, and certain types of cancer  It can also increase your risk for osteoarthritis, sleep apnea, and other respiratory problems  Aim for a slow, steady weight loss  Even a small amount of weight loss can lower your risk of health problems    How to lose weight safely:  A safe and healthy way to lose weight is to eat fewer calories and get regular exercise  You can lose up about 1 pound a week by decreasing the number of calories you eat by 500 calories each day  You can decrease calories by eating smaller portion sizes or by cutting out high-calorie foods  Read labels to find out how many calories are in the foods you eat  You can also burn calories with exercise such as walking, swimming, or biking  You will be more likely to keep weight off if you make these changes part of your lifestyle  Healthy meal plan for weight management:  A healthy meal plan includes a variety of foods, contains fewer calories, and helps you stay healthy  A healthy meal plan includes the following:  · Eat whole-grain foods more often  A healthy meal plan should contain fiber  Fiber is the part of grains, fruits, and vegetables that is not broken down by your body  Whole-grain foods are healthy and provide extra fiber in your diet  Some examples of whole-grain foods are whole-wheat breads and pastas, oatmeal, brown rice, and bulgur  · Eat a variety of vegetables every day  Include dark, leafy greens such as spinach, kale, carolin greens, and mustard greens  Eat yellow and orange vegetables such as carrots, sweet potatoes, and winter squash  · Eat a variety of fruits every day  Choose fresh or canned fruit (canned in its own juice or light syrup) instead of juice  Fruit juice has very little or no fiber  · Eat low-fat dairy foods  Drink fat-free (skim) milk or 1% milk  Eat fat-free yogurt and low-fat cottage cheese  Try low-fat cheeses such as mozzarella and other reduced-fat cheeses  · Choose meat and other protein foods that are low in fat  Choose beans or other legumes such as split peas or lentils  Choose fish, skinless poultry (chicken or turkey), or lean cuts of red meat (beef or pork)  Before you cook meat or poultry, cut off any visible fat  · Use less fat and oil  Try baking foods instead of frying them  Add less fat, such as margarine, sour cream, regular salad dressing and mayonnaise to foods  Eat fewer high-fat foods  Some examples of high-fat foods include french fries, doughnuts, ice cream, and cakes  · Eat fewer sweets  Limit foods and drinks that are high in sugar  This includes candy, cookies, regular soda, and sweetened drinks  Ways to decrease calories:   · Eat smaller portions  ¨ Use a small plate with smaller servings  ¨ Do not eat second helpings  ¨ When you eat at a restaurant, ask for a box and place half of your meal in the box before you eat  ¨ Share an entrée with someone else  · Replace high-calorie snacks with healthy, low-calorie snacks  ¨ Choose fresh fruit, vegetables, fat-free rice cakes, or air-popped popcorn instead of potato chips, nuts, or chocolate  ¨ Choose water or calorie-free drinks instead of soda or sweetened drinks  · Eat regular meals  Skipping meals can lead to overeating later in the day  Eat a healthy snack in place of a meal if you do not have time to eat a regular meal      · Do not shop for groceries when you are hungry  You may be more likely to make unhealthy food choices  Take a grocery list of healthy foods and shop after you have eaten  Exercise:  Exercise at least 30 minutes per day on most days of the week  Some examples of exercise include walking, biking, dancing, and swimming  You can also fit in more physical activity by taking the stairs instead of the elevator or parking farther away from stores  Ask your healthcare provider about the best exercise plan for you  Other things to consider as you try to lose weight:   · Be aware of situations that may give you the urge to overeat, such as eating while watching television  Find ways to avoid these situations  For example, read a book, go for a walk, or do crafts      · Meet with a weight loss support group or friends who are also trying to lose weight  This may help you stay motivated to continue working on your weight loss goals  © 2017 [address] Information is for End User's use only and may not be sold, redistributed or otherwise used for commercial purposes  All illustrations and images included in CareNotes® are the copyrighted property of [employer]  or Kalyan Thayer  The above information is an  only  It is not intended as medical advice for individual conditions or treatments  Talk to your doctor, nurse or pharmacist before following any medical regimen to see if it is safe and effective for you  Low Fat Diet   AMBULATORY CARE:   A low-fat diet  is an eating plan that is low in total fat, unhealthy fat, and cholesterol  You may need to follow a low-fat diet if you have trouble digesting or absorbing fat  You may also need to follow this diet if you have high cholesterol  You can also lower your cholesterol by increasing the amount of fiber in your diet  Soluble fiber is a type of fiber that helps to decrease cholesterol levels  Different types of fat in food:   · Limit unhealthy fats  A diet that is high in cholesterol, saturated fat, and trans fat may cause unhealthy cholesterol levels  Unhealthy cholesterol levels increase your risk of heart disease  ¨ Cholesterol:  Limit intake of cholesterol to less than 200 mg per day  Cholesterol is found in meat, eggs, and dairy  ¨ Saturated fat:  Limit saturated fat to less than 7% of your total daily calories  Ask your dietitian how many calories you need each day  Saturated fat is found in butter, cheese, ice cream, whole milk, and palm oil  Saturated fat is also found in meat, such as beef, pork, chicken skin, and processed meats  Processed meats include sausage, hot dogs, and bologna  ¨ Trans fat:  Avoid trans fat as much as possible  Trans fat is used in fried and baked foods   Foods that say trans fat free on the label may still have up to 0 5 grams of trans fat per serving  · Include healthy fats  Replace foods that are high in saturated and trans fat with foods high in healthy fats  This may help to decrease high cholesterol levels  ¨ Monounsaturated fats: These are found in avocados, nuts, and vegetable oils, such as olive, canola, and sunflower oil  ¨ Polyunsaturated fats: These can be found in vegetable oils, such as soybean or corn oil  Omega-3 fats can help to decrease the risk of heart disease  Omega-3 fats are found in fish, such as salmon, herring, trout, and tuna  Omega-3 fats can also be found in plant foods, such as walnuts, flaxseed, soybeans, and canola oil    Foods to limit or avoid:   · Grains:      ¨ Snacks that are made with partially hydrogenated oils, such as chips, regular crackers, and butter-flavored popcorn    ¨ High-fat baked goods, such as biscuits, croissants, doughnuts, pies, cookies, and pastries    · Dairy:      ¨ Whole milk, 2% milk, and yogurt and ice cream made with whole milk    ¨ Half and half creamer, heavy cream, and whipping cream    ¨ Cheese, cream cheese, and sour cream    · Meats and proteins:      ¨ High-fat cuts of meat (T-bone steak, regular hamburger, and ribs)    ¨ Fried meat, poultry (turkey and chicken), and fish    ¨ Poultry (chicken and turkey) with skin    ¨ Cold cuts (salami or bologna), hot dogs, sommer, and sausage    ¨ Whole eggs and egg yolks    · Vegetables and fruits with added fat:      ¨ Fried vegetables or vegetables in butter or high-fat sauces, such as cream or cheese sauces    ¨ Fried fruit or fruit served with butter or cream    · Fats:      ¨ Butter, stick margarine, and shortening    ¨ Coconut, palm oil, and palm kernel oil  Foods to include:   · Grains:      ¨ Whole-grain breads, cereals, pasta, and brown rice    ¨ Low-fat crackers and pretzels    · Vegetables and fruits:      ¨ Fresh, frozen, or canned vegetables (no salt or low-sodium)    ¨ Fresh, frozen, dried, or canned fruit (canned in light syrup or fruit juice)    ¨ Avocado    · Low-fat dairy products:      ¨ Nonfat (skim) or 1% milk    ¨ Nonfat or low-fat cheese, yogurt, and cottage cheese    · Meats and proteins:      ¨ Chicken or turkey with no skin    ¨ Baked or broiled fish    ¨ Lean beef and pork (loin, round, extra lean hamburger)    ¨ Beans and peas, unsalted nuts, soy products    ¨ Egg whites and substitutes    ¨ Seeds and nuts    · Fats:      ¨ Unsaturated oil, such as canola, olive, peanut, soybean, or sunflower oil    ¨ Soft or liquid margarine and vegetable oil spread    ¨ Low-fat salad dressing  Other ways to decrease fat:   · Read food labels before you buy foods  Choose foods that have less than 30% of calories from fat  Choose low-fat or fat-free dairy products  Remember that fat free does not mean calorie free  These foods still contain calories, and too many calories can lead to weight gain  · Trim fat from meat and avoid fried food  Trim all visible fat from meat before you cook it  Remove the skin from poultry  Do not josue meat, fish, or poultry  Bake, roast, boil, or broil these foods instead  Avoid fried foods  Eat a baked potato instead of [place] fries  Steam vegetables instead of sautéing them in butter  · Add less fat to foods  Use imitation sommer bits on salads and baked potatoes instead of regular sommer bits  Use fat-free or low-fat salad dressings instead of regular dressings  Use low-fat or nonfat butter-flavored topping instead of regular butter or margarine on popcorn and other foods  Ways to decrease fat in recipes:  Replace high-fat ingredients with low-fat or nonfat ones  This may cause baked goods to be drier than usual  You may need to use nonfat cooking spray on pans to prevent food from sticking  You also may need to change the amount of other ingredients, such as water, in the recipe   Try the following:  · Use low-fat or light margarine instead of regular margarine or shortening  · Use lean ground turkey breast or chicken, or lean ground beef (less than 5% fat) instead of hamburger  · Add 1 teaspoon of canola oil to 8 ounces of skim milk instead of using cream or half and half  · Use grated zucchini, carrots, or apples in breads instead of coconut  · Use blenderized, low-fat cottage cheese, plain tofu, or low-fat ricotta cheese instead of cream cheese  · Use 1 egg white and 1 teaspoon of canola oil, or use ¼ cup (2 ounces) of fat-free egg substitute instead of a whole egg  · Replace half of the oil that is called for in a recipe with applesauce when you bake  Use 3 tablespoons of cocoa powder and 1 tablespoon of canola oil instead of a square of baking chocolate  How to increase fiber:  Eat enough high-fiber foods to get 20 to 30 grams of fiber every day  Slowly increase your fiber intake to avoid stomach cramps, gas, and other problems  · Eat 3 ounces of whole-grain foods each day  An ounce is about 1 slice of bread  Eat whole-grain breads, such as whole-wheat bread  Whole wheat, whole-wheat flour, or other whole grains should be listed as the first ingredient on the food label  Replace white flour with whole-grain flour or use half of each in recipes  Whole-grain flour is heavier than white flour, so you may have to add more yeast or baking powder  · Eat a high-fiber cereal for breakfast   Oatmeal is a good source of soluble fiber  Look for cereals that have bran or fiber in the name  Choose whole-grain products, such as brown rice, barley, and whole-wheat pasta  · Eat more beans, peas, and lentils  For example, add beans to soups or salads  Eat at least 5 cups of fruits and vegetables each day  Eat fruits and vegetables with the peel because the peel is high in fiber  © 2017 [employer] Information is for End User's use only and may not be sold, redistributed or otherwise used for commercial purposes   All illustrations and images included in CareNotes® are the copyrighted property of [employer]  or Kalyan Thayer  The above information is an  only  It is not intended as medical advice for individual conditions or treatments  Talk to your doctor, nurse or pharmacist before following any medical regimen to see if it is safe and effective for you  Heart Healthy Diet   AMBULATORY CARE:   A heart healthy diet  is an eating plan low in total fat, unhealthy fats, and sodium (salt)  A heart healthy diet helps decrease your risk for heart disease and stroke  Limit the amount of fat you eat to 25% to 35% of your total daily calories  Limit sodium to less than 2,300 mg each day  Healthy fats:  Healthy fats can help improve cholesterol levels  The risk for heart disease is decreased when cholesterol levels are normal  Choose healthy fats, such as the following:  · Unsaturated fat  is found in foods such as soybean, canola, olive, corn, and safflower oils  It is also found in soft tub margarine that is made with liquid vegetable oil  · Omega-3 fat  is found in certain fish, such as salmon, tuna, and trout, and in walnuts and flaxseed  Unhealthy fats:  Unhealthy fats can cause unhealthy cholesterol levels in your blood and increase your risk of heart disease  Limit unhealthy fats, such as the following:  · Cholesterol  is found in animal foods, such as eggs and lobster, and in dairy products made from whole milk  Limit cholesterol to less than 300 milligrams (mg) each day  You may need to limit cholesterol to 200 mg each day if you have heart disease  · Saturated fat  is found in meats, such as sommer and hamburger  It is also found in chicken or turkey skin, whole milk, and butter  Limit saturated fat to less than 7% of your total daily calories  Limit saturated fat to less than 6% if you have heart disease or are at increased risk for it       · Trans fat  is found in packaged foods, such as potato chips and cookies  It is also in hard margarine, some fried foods, and shortening  Avoid trans fats as much as possible    Heart healthy foods and drinks to include:  Ask your dietitian or healthcare provider how many servings to have from each of the following food groups:  · Grains:      ¨ Whole-wheat breads, cereals, and pastas, and brown rice    ¨ Low-fat, low-sodium crackers and chips    · Vegetables:      ¨ Broccoli, green beans, green peas, and spinach    ¨ Collards, kale, and lima beans    ¨ Carrots, sweet potatoes, tomatoes, and peppers    ¨ Canned vegetables with no salt added    · Fruits:      ¨ Bananas, peaches, pears, and pineapple    ¨ Grapes, raisins, and dates    ¨ Oranges, tangerines, grapefruit, orange juice, and grapefruit juice    ¨ Apricots, mangoes, melons, and papaya    ¨ Raspberries and strawberries    ¨ Canned fruit with no added sugar    · Low-fat dairy products:      ¨ Nonfat (skim) milk, 1% milk, and low-fat almond, cashew, or soy milks fortified with calcium    ¨ Low-fat cheese, regular or frozen yogurt, and cottage cheese    · Meats and proteins , such as lean cuts of beef and pork (loin, leg, round), skinless chicken and turkey, legumes, soy products, egg whites, and nuts  Foods and drinks to limit or avoid:  Ask your dietitian or healthcare provider about these and other foods that are high in unhealthy fat, sodium, and sugar:  · Snack or packaged foods , such as frozen dinners, cookies, macaroni and cheese, and cereals with more than 300 mg of sodium per serving    · Canned or dry mixes  for cakes, soups, sauces, or gravies    · Vegetables with added sodium , such as instant potatoes, vegetables with added sauces, or regular canned vegetables    · Other foods high in sodium , such as ketchup, barbecue sauce, salad dressing, pickles, olives, soy sauce, and miso    · High-fat dairy foods  such as whole or 2% milk, cream cheese, or sour cream, and cheeses     · High-fat protein foods  such as high-fat cuts of beef (T-bone steaks, ribs), chicken or turkey with skin, and organ meats, such as liver    · Cured or smoked meats , such as hot dogs, sommer, and sausage    · Unhealthy fats and oils , such as butter, stick margarine, shortening, and cooking oils such as coconut or palm oil    · Food and drinks high in sugar , such as soft drinks (soda), sports drinks, sweetened tea, candy, cake, cookies, pies, and doughnuts  Other diet guidelines to follow:   · Eat more foods containing omega-3 fats  Eat fish high in omega-3 fats at least 2 times a week  · Limit alcohol  Too much alcohol can damage your heart and raise your blood pressure  Women should limit alcohol to 1 drink a day  Men should limit alcohol to 2 drinks a day  A drink of alcohol is 12 ounces of beer, 5 ounces of wine, or 1½ ounces of liquor  · Choose low-sodium foods  High-sodium foods can lead to high blood pressure  Add little or no salt to food you prepare  Use herbs and spices in place of salt  · Eat more fiber  to help lower cholesterol levels  Eat at least 5 servings of fruits and vegetables each day  Eat 3 ounces of whole-grain foods each day  Legumes (beans) are also a good source of fiber  Lifestyle guidelines:   · Do not smoke  Nicotine and other chemicals in cigarettes and cigars can cause lung and heart damage  Ask your healthcare provider for information if you currently smoke and need help to quit  E-cigarettes or smokeless tobacco still contain nicotine  Talk to your healthcare provider before you use these products  · Exercise regularly  to help you maintain a healthy weight and improve your blood pressure and cholesterol levels  Ask your healthcare provider about the best exercise plan for you  Do not start an exercise program without asking your healthcare provider  Follow up with your healthcare provider as directed:  Write down your questions so you remember to ask them during your visits     © 2017 [employer] [address] is for End User's use only and may not be sold, redistributed or otherwise used for commercial purposes  All illustrations and images included in CareNotes® are the copyrighted property of [employer]  or Kalyan Thayer  The above information is an  only  It is not intended as medical advice for individual conditions or treatments  Talk to your doctor, nurse or pharmacist before following any medical regimen to see if it is safe and effective for you  Calorie Counting Diet   WHAT YOU NEED TO KNOW:   What is a calorie counting diet? It is a meal plan based on counting calories each day to reach a healthy body weight  You will need to eat fewer calories if you are trying to lose weight  Weight loss may decrease your risk for certain health problems or improve your health if you have health problems  Some of these health problems include heart disease, high blood pressure, and diabetes  What foods should I avoid? Your dietitian will tell you if you need to avoid certain foods based on your body weight and health condition  You may need to avoid high-fat foods if you are at risk for or have heart disease  You may need to eat fewer foods from the breads and starches food group if you have diabetes  How many calories are in foods? The following is a list of foods and drinks with the approximate number of calories in each  Check the food label to find the exact number of calories  A dietitian can tell you how many calories you should have from each food group each day    · Carbohydrate:      ¨ ½ of a 3-inch bagel, 1 slice of bread, or ½ of a hamburger bun or hot dog bun (80)    ¨ 1 (8-inch) flour tortilla or ½ cup of cooked rice (100)    ¨ 1 (6-inch) corn tortilla (80)    ¨ 1 (6-inch) pancake or 1 cup of bran flakes cereal (110)    ¨ ½ cup of cooked cereal (80)    ¨ ½ cup of cooked pasta (85)    ¨ 1 ounce of pretzels (100)    ¨ 3 cups of air-popped popcorn without butter or oil (80)    · Dairy:      ¨ 1 cup of skim or 1% milk (90)    ¨ 1 cup of 2% milk (120)    ¨ 1 cup of whole milk (160)    ¨ 1 cup of 2% chocolate milk (220)    ¨ 1 ounce of low-fat cheese with 3 grams of fat per ounce (70)    ¨ 1 ounce of cheddar cheese (114)    ¨ ½ cup of 1% fat cottage cheese (80)    ¨ 1 cup of plain or sugar-free, fat-free yogurt (90)    · Protein foods:      ¨ 3 ounces of fish (not breaded or fried) (95)    ¨ 3 ounces of breaded, fried fish (195)    ¨ ¾ cup of tuna canned in water (105)    ¨ 3 ounces of chicken breast without skin (105)    ¨ 1 fried chicken breast with skin (350)    ¨ ¼ cup of fat free egg substitute (40)    ¨ 1 large egg (75)    ¨ 3 ounces of lean beef or pork (165)    ¨ 3 ounces of fried pork chop or ham (185)    ¨ ½ cup of cooked dried beans, such as kidney, soto, lentils, or navy (115)    ¨ 3 ounces of bologna or lunch meat (225)    ¨ 2 links of breakfast sausage (140)    · Vegetables:      ¨ ½ cup of sliced mushrooms (10)    ¨ 1 cup of salad greens, such as lettuce, spinach, or westley (15)    ¨ ½ cup of steamed asparagus (20)    ¨ ½ cup of cooked summer squash, zucchini squash, or green or wax beans (25)    ¨ 1 cup of broccoli or cauliflower florets, or 1 medium tomato (25)    ¨ 1 large raw carrot or ½ cup of cooked carrots (40)    ¨ ? of a medium cucumber or 1 stalk of celery (5)    ¨ 1 small baked potato (160)    ¨ 1 cup of breaded, fried vegetables (230)    · Fruit:      ¨ 1 (6-inch) banana (55)     ¨ ½ of a 4-inch grapefruit (55)    ¨ 15 grapes (60)    ¨ 1 medium orange or apple (70)    ¨ 1 large peach (65)    ¨ 1 cup of fresh pineapple chunks (75)    ¨ 1 cup of melon cubes (50)    ¨ 1¼ cups of whole strawberries (45)    ¨ ½ cup of fruit canned in juice (55)    ¨ ½ cup of fruit canned in heavy syrup (110)    ¨ ?  cup of raisins (130)    ¨ ½ cup of unsweetened fruit juice (60)    ¨ ½ cup of grape, cranberry, or prune juice (90)    · Fat: ¨ 10 peanuts or 2 teaspoons of peanut butter (55)    ¨ 2 tablespoons of avocado or 1 tablespoon of regular salad dressing (45)    ¨ 2 slices of sommer (90)    ¨ 1 teaspoon of oil, such as safflower, canola, corn, or olive oil (45)    ¨ 2 teaspoons of low-fat margarine, or 1 tablespoon of low-fat mayonnaise (50)    ¨ 1 teaspoon of regular margarine (40)    ¨ 1 tablespoon of regular mayonnaise (135)    ¨ 1 tablespoon of cream cheese or 2 tablespoons of low-fat cream cheese (45)    ¨ 2 tablespoons of vegetable shortening (215)    · Dessert and sweets:      ¨ 8 animal crackers or 5 vanilla wafers (80)    ¨ 1 frozen fruit juice bar (80)    ¨ ½ cup of ice milk or low-fat frozen yogurt (90)    ¨ ½ cup of sherbet or sorbet (125)    ¨ ½ cup of sugar-free pudding or custard (60)    ¨ ½ cup of ice cream (140)    ¨ ½ cup of pudding or custard (175)    ¨ 1 (2-inch) square chocolate brownie (185)    · Combination foods:      ¨ Bean burrito made with an 8-inch tortilla, without cheese (275)    ¨ Chicken breast sandwich with lettuce and tomato (325)    ¨ 1 cup of chicken noodle soup (60)    ¨ 1 beef taco (175)    ¨ Regular hamburger with lettuce and tomato (310)    ¨ Regular cheeseburger with lettuce and tomato (410)     ¨ ¼ of a 12-inch cheese pizza (280)    ¨ Fried fish sandwich with lettuce and tomato (425)    ¨ Hot dog and bun (275)    ¨ 1½ cups of macaroni and cheese (310)    ¨ Taco salad with a fried tortilla shell (870)    · Low-calorie foods:      ¨ 1 tablespoon of ketchup or 1 tablespoon of fat free sour cream (15)    ¨ 1 teaspoon of mustard (5)    ¨ ¼ cup of salsa (20)    ¨ 1 large dill pickle (15)    ¨ 1 tablespoon of fat free salad dressing (10)    ¨ 2 teaspoons of low-sugar, light jam or jelly, or 1 tablespoon of sugar-free syrup (15)    ¨ 1 sugar-free popsicle (15)    ¨ 1 cup of club soda, seltzer water, or diet soda (0)  CARE AGREEMENT:   You have the right to help plan your care   Discuss treatment options with your caregivers to decide what care you want to receive  You always have the right to refuse treatment  The above information is an  only  It is not intended as medical advice for individual conditions or treatments  Talk to your doctor, nurse or pharmacist before following any medical regimen to see if it is safe and effective for you  © 2017 [employer] Information is for End User's use only and may not be sold, redistributed or otherwise used for commercial purposes  All illustrations and images included in CareNotes® are the copyrighted property of A D A M [employer]  or Kalyan Thayer

## 2018-12-19 NOTE — ASSESSMENT & PLAN NOTE
Discontinue lisinopril 10 mg due to potential side effects  Will start losartan 25 mg daily  He is to contact office for worsening symptoms

## 2018-12-19 NOTE — ASSESSMENT & PLAN NOTE
Lab Results   Component Value Date    HGBA1C 5 4 11/21/2018      well controlled with current regimen, continue with metformin 500 mg daily  Discussed starting a diet, exercise and weight loss plan come the year

## 2018-12-28 ENCOUNTER — OFFICE VISIT (OUTPATIENT)
Dept: BARIATRICS | Facility: CLINIC | Age: 38
End: 2018-12-28
Payer: COMMERCIAL

## 2018-12-28 VITALS
DIASTOLIC BLOOD PRESSURE: 80 MMHG | HEIGHT: 73 IN | WEIGHT: 315 LBS | RESPIRATION RATE: 18 BRPM | SYSTOLIC BLOOD PRESSURE: 122 MMHG | HEART RATE: 77 BPM | TEMPERATURE: 97.5 F | BODY MASS INDEX: 41.75 KG/M2

## 2018-12-28 DIAGNOSIS — M54.16 LUMBAR RADICULITIS: ICD-10-CM

## 2018-12-28 DIAGNOSIS — E66.01 MORBID OBESITY (HCC): Primary | Chronic | ICD-10-CM

## 2018-12-28 DIAGNOSIS — E11.9 TYPE 2 DIABETES MELLITUS WITHOUT COMPLICATION, WITHOUT LONG-TERM CURRENT USE OF INSULIN (HCC): Chronic | ICD-10-CM

## 2018-12-28 DIAGNOSIS — E66.01 OBESITY, CLASS III, BMI 40-49.9 (MORBID OBESITY) (HCC): ICD-10-CM

## 2018-12-28 DIAGNOSIS — I10 ESSENTIAL HYPERTENSION: ICD-10-CM

## 2018-12-28 PROCEDURE — 99203 OFFICE O/P NEW LOW 30 MIN: CPT | Performed by: FAMILY MEDICINE

## 2018-12-28 NOTE — PROGRESS NOTES
Assessment/Plan:    No problem-specific Assessment & Plan notes found for this encounter  Diagnoses and all orders for this visit:    Morbid obesity ([facility] [address] )  -     Ambulatory referral to Weight Management    Obesity, Class III, BMI 40-49 9 (morbid obesity) ([facility] )    Type 2 diabetes mellitus without complication, without long-term current use of insulin (HCC)    Essential hypertension    Lumbar radiculitis        -Discussed options of HealthyCORE-Intensive Lifestyle Intervention Program, Very Low Calorie Diet-VLCD, Conservative Program, Joe-En-Y Gastric Bypass and Vertical Sleeve Gastrectomy and the role of weight loss medications   -Initial weight loss goal of 5-10% weight loss for improved health  -Screening labs-reviewed from Nov  -Patient is interested in pursuing Conservative Program    Goals:  Food log (ie ) www myfitnesspal com,sparkpeople  com,loseit com,calorieking  com,etc  baritastic  No sugary beverages  At least 64oz of water daily  Increase physical activity by 10 minutes daily  Gradually increase physical activity to a goal of 5 days per week for 30 minutes of MODERATE intensity PLUS 2 days per week of FULL BODY resistance training  Goal protein intake of 60-80 grams per day, 5-10 servings of fruits and vegetables per day, 25-35 grams of dietary fiber per day, Limit caffeine to 16oz per day and 2,200 lc a day      45 minute visit, >50% face-to-face time spent counseling patient on surgical and nonsurgical interventions for the treatment of excess weight  Discussed the advantages and long-term outcomes with regards to bariatric surgery  Discussed in detail nonsurgical options including intensive lifestyle intervention program, very low-calorie diet program and conservative program   Discussed the role of weight loss medications  Counseled patient on diet behavior and exercise modification for weight loss        Follow up in approximately 1 month with Non-Surgical Physician/Advanced Practitioner  Subjective:   Chief Complaint   Patient presents with    Consult     MWM consult        Patient ID: Sal Galeano  is a 41 y o  male with excess weight/obesity here to pursue weight management  Past Medical History:   Diagnosis Date    Diabetes mellitus ([facility] [address] )     Diabetes mellitus ([facility] [address] )     other type with circulatory complication, without long-term current    Essential hypertension 2018    Hearing loss     r ear only    Obesity     Onychomycosis of multiple toenails with type 2 diabetes mellitus ([facility] [address] )     last assessed: 3/21/2017       HPI:    Pt present for [facility] consult  Referred by his PCP  Obesity/Excess Weight:  Severity: Very Severe  Onset:   when he started working for [employer], he became more sedentary  Modifiers: Diet and Exercise  Contributing factors: Poor Food Choices and Insufficient Physical Activity  Associated symptoms: increased joint pain, decreased exercise capacity and inability to do certain activities    Highest wt 429lbs  He lost down to 360lbs with help of a nutritionist  He was motivated to loss wt since he injured his back last year  Goals: Healthy weight, less 300lbs   Hydration: water- 4 glasses a day, soda- zero coke with meals about 2 a day  1 orange juice a day with breakfast  Coffee- none  Alcohol: 4-5 beer a week (on )   Exercise: was doing PT for his back issues  Sleep: 6-8hr  STOP ban/8, had referral by PCP but cant get it done jud to his work schedule  He is also not interested in using a mask  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  The following portions of the patient's history were reviewed and updated as appropriate: allergies, current medications, past family history, past medical history, past social history, past surgical history and problem list     Review of Systems   Constitutional: Negative for activity change and appetite change  HENT: Positive for congestion  Eyes: Negative           Denies hx of glaucoma   Respiratory: Negative  Cardiovascular: Negative  Gastrointestinal: Negative  Hx of gallstones s/p cholecystectomy   Genitourinary: Negative  Denies hx of kidney stones   Musculoskeletal: Positive for back pain  Negative for arthralgias  Denies hx of gout   Skin: Negative for rash  Neurological: Negative  Psychiatric/Behavioral: Negative  Objective:    /80 (BP Location: Left arm, Patient Position: Sitting, Cuff Size: Adult)   Pulse 77   Temp 97 5 °F (36 4 °C) (Tympanic)   Resp 18   Ht 6' 0 75" (1 848 m)   Wt (!) 170 kg (374 lb 8 oz)   BMI 49 75 kg/m²     Physical Exam   Constitutional   General appearance: Abnormal   well developed and morbidly obese  Eyes No conjunctival pallor  Ears, Nose, Mouth, and Throat Oral mucosa moist    Pulmonary   Respiratory effort: No increased work of breathing or signs of respiratory distress  Auscultation of lungs: Clear to auscultation, equal breath sounds bilaterally, no wheezes, no rales, no rhonci  Cardiovascular   Auscultation of heart: Normal rate and rhythm, normal S1 and S2, without murmurs  Examination of extremities for edema and/or varicosities: Normal   no edema  Abdomen   Abdomen: Abnormal   The abdomen was obese  Bowel sounds were normal  The abdomen was soft and nontender     Musculoskeletal   Gait and station: Normal     Psychiatric   Orientation to person, place and time: Normal     Affect: appropriate

## 2018-12-28 NOTE — PATIENT INSTRUCTIONS
Goals: Food log (ie ) www myfitnesspal com,sparkpeople  com,loseit com,calorieking  com,etc  baritastic  No sugary beverages  At least 64oz of water daily  Increase physical activity by 10 minutes daily   Gradually increase physical activity to a goal of 5 days per week for 30 minutes of MODERATE intensity PLUS 2 days per week of FULL BODY resistance training  Goal protein intake of 60-80 grams per day, 5-10 servings of fruits and vegetables per day, 25-35 grams of dietary fiber per day, Limit caffeine to 16oz per day and 2,200 lc a day

## 2019-01-18 DIAGNOSIS — E11.8 TYPE 2 DIABETES MELLITUS WITH COMPLICATION, UNSPECIFIED WHETHER LONG TERM INSULIN USE: ICD-10-CM

## 2019-02-14 DIAGNOSIS — E11.9 TYPE 2 DIABETES MELLITUS WITHOUT COMPLICATION, WITHOUT LONG-TERM CURRENT USE OF INSULIN (HCC): Chronic | ICD-10-CM

## 2019-02-14 RX ORDER — LISINOPRIL 10 MG/1
TABLET ORAL
Qty: 90 TABLET | Refills: 1 | OUTPATIENT
Start: 2019-02-14

## 2019-03-21 ENCOUNTER — OFFICE VISIT (OUTPATIENT)
Dept: INTERNAL MEDICINE CLINIC | Facility: CLINIC | Age: 39
End: 2019-03-21
Payer: COMMERCIAL

## 2019-03-21 VITALS
OXYGEN SATURATION: 97 % | SYSTOLIC BLOOD PRESSURE: 120 MMHG | TEMPERATURE: 98 F | HEIGHT: 73 IN | BODY MASS INDEX: 41.75 KG/M2 | DIASTOLIC BLOOD PRESSURE: 78 MMHG | HEART RATE: 71 BPM | WEIGHT: 315 LBS

## 2019-03-21 DIAGNOSIS — N52.8 OTHER MALE ERECTILE DYSFUNCTION: ICD-10-CM

## 2019-03-21 DIAGNOSIS — E11.9 TYPE 2 DIABETES MELLITUS WITHOUT COMPLICATION, WITHOUT LONG-TERM CURRENT USE OF INSULIN (HCC): Chronic | ICD-10-CM

## 2019-03-21 DIAGNOSIS — E66.01 MORBID OBESITY (HCC): Chronic | ICD-10-CM

## 2019-03-21 DIAGNOSIS — I10 ESSENTIAL HYPERTENSION: Primary | ICD-10-CM

## 2019-03-21 DIAGNOSIS — M77.8 LEFT WRIST TENDONITIS: ICD-10-CM

## 2019-03-21 PROCEDURE — 99214 OFFICE O/P EST MOD 30 MIN: CPT | Performed by: INTERNAL MEDICINE

## 2019-03-21 PROCEDURE — 3074F SYST BP LT 130 MM HG: CPT | Performed by: INTERNAL MEDICINE

## 2019-03-21 PROCEDURE — 3078F DIAST BP <80 MM HG: CPT | Performed by: INTERNAL MEDICINE

## 2019-03-21 RX ORDER — SILDENAFIL 100 MG/1
100 TABLET, FILM COATED ORAL DAILY PRN
Qty: 10 TABLET | Refills: 0 | Status: SHIPPED | OUTPATIENT
Start: 2019-03-21 | End: 2019-07-12 | Stop reason: SDUPTHER

## 2019-03-21 NOTE — PROGRESS NOTES
Assessment/Plan:    Essential hypertension  Stable off losartan, continue to monitor off antihypertensives  Type 2 diabetes mellitus ([facility] [address] )  Continue with metformin 500 mg daily  Will recheck A1c  Morbid obesity ([facility] [address] )  Discussed lifestyle modifications with diet, exercise and weight loss  He is to schedule an appointment with weight management  Other male erectile dysfunction  Continue with sildenafil, may take  mg daily PRN  Left wrist tendonitis  Recommend home ROM, stretching, and strength training exercises as tolerated  Take tylenol/NSAIDs PRN  BMI Counseling: Body mass index is 52 34 kg/m²  Discussed the patient's BMI with him  The BMI is above average  BMI counseling and education was provided to the patient  Nutrition recommendations include reducing portion sizes, decreasing overall calorie intake, 3-5 servings of fruits/vegetables daily, reducing fast food intake, consuming healthier snacks, decreasing soda and/or juice intake, moderation in carbohydrate intake, increasing intake of lean protein, reducing intake of saturated fat and trans fat and reducing intake of cholesterol  Exercise recommendations include moderate aerobic physical activity for 150 minutes/week and exercising 3-5 times per week  Referral to weight management was provided to the patient  Subjective:      Patient ID: Robert Estrella is a 42 y o  male  Patient presents today for health maintenance  Hypertension well-controlled now off of medications  Last HbA1c 5 4% in 11/2018  No new labs for today's visit, but patient taking metformin as prescribed  Patient reports motivation to change diet and increase exercise in order to lose weight  Patient reports new-onset tenderness in R wrist that has been present for 2 weeks  Wrist pain worsens when thumb is flexed  Pain has been stable over this time  No associated numbness or tingling   Patient has been seeing chiropractor who has been working on wrist exercises  Patient's daily activities not affected  He does bowl however denies any pain with bowling  He continues to have erectile dysfunction however is improved with sildenafil  He take 50 mg sildenafil as needed for ED  Asymptomatic when taking sildenafil  He denies any issues with his relationship  Wrist Pain    The pain is present in the right wrist  This is a new problem  The current episode started 1 to 4 weeks ago  There has been no history of extremity trauma  The problem occurs daily  The problem has been unchanged  The quality of the pain is described as sharp  Pertinent negatives include no fever, inability to bear weight, itching, joint locking, joint swelling, limited range of motion, numbness, stiffness or tingling  Exacerbated by: thumb flexion  Treatments tried: Epson salt and warm water soak  The treatment provided mild relief  The following portions of the patient's history were reviewed and updated as appropriate: allergies, current medications, past family history, past medical history, past social history, past surgical history and problem list     Review of Systems   Constitutional: Negative for activity change, appetite change, chills, diaphoresis, fatigue and fever  HENT: Negative for congestion, ear discharge, ear pain, hearing loss, postnasal drip, rhinorrhea, sinus pressure, sinus pain, sneezing, sore throat and trouble swallowing  Eyes: Negative for photophobia, pain, discharge, redness, itching and visual disturbance  Respiratory: Negative for apnea, cough, choking, chest tightness, shortness of breath and wheezing  Cardiovascular: Negative for chest pain, palpitations and leg swelling  Gastrointestinal: Negative for abdominal distention, abdominal pain, anal bleeding, blood in stool, constipation, diarrhea, nausea and vomiting  Endocrine: Negative for cold intolerance, heat intolerance, polydipsia, polyphagia and polyuria     Genitourinary: Negative for difficulty urinating, discharge, dysuria, frequency, hematuria, penile pain, penile swelling, testicular pain and urgency  Musculoskeletal: Positive for arthralgias and back pain  Negative for gait problem, joint swelling and stiffness  Skin: Negative  Negative for color change, itching, pallor and rash  Neurological: Negative for dizziness, tingling, tremors, weakness, light-headedness, numbness and headaches  Hematological: Negative for adenopathy  Psychiatric/Behavioral: Negative for agitation, behavioral problems, confusion, sleep disturbance and suicidal ideas  All other systems reviewed and are negative          Past Medical History:   Diagnosis Date    Diabetes mellitus ([facility] )     Diabetes mellitus ([facility] )     other type with circulatory complication, without long-term current    Essential hypertension 12/19/2018    Hearing loss     r ear only    Obesity     Onychomycosis of multiple toenails with type 2 diabetes mellitus ([facility] )     last assessed: 3/21/2017         Current Outpatient Medications:     Cetirizine HCl (ZYRTEC ALLERGY PO), Take by mouth as needed, Disp: , Rfl:     metFORMIN (GLUCOPHAGE) 500 mg tablet, Take 1 tablet (500 mg total) by mouth 2 (two) times a day with meals, Disp: 180 tablet, Rfl: 1    DiphenhydrAMINE HCl (BENADRYL ALLERGY PO), Take by mouth as needed, Disp: , Rfl:     Fexofenadine HCl (ALLEGRA PO), Take 1 tablet by mouth as needed  , Disp: , Rfl:     losartan (COZAAR) 50 mg tablet, Take 0 5 tablets (25 mg total) by mouth daily (Patient not taking: Reported on 3/21/2019), Disp: 30 tablet, Rfl: 3    sildenafil (VIAGRA) 100 mg tablet, Take 1 tablet (100 mg total) by mouth daily as needed for erectile dysfunction, Disp: 10 tablet, Rfl: 0    Allergies   Allergen Reactions    Menthol Rash       Social History   Past Surgical History:   Procedure Laterality Date    CHOLANGIOGRAM N/A 10/24/2018    Procedure: CHOLANGIOGRAM;  Surgeon: Kathia Polk MD;  Location: [facility];  Service: General    CHOLECYSTECTOMY      CHOLECYSTECTOMY LAPAROSCOPIC N/A 10/24/2018    Procedure: Emil Kate;  Surgeon: Verner Hatchet, MD;  Location: [facility];  Service: General    MOUTH SURGERY  12/28/2017    TONSILLECTOMY AND ADENOIDECTOMY       Family History   Problem Relation Age of Onset    Diabetes Mother     Stomach cancer Other        Objective:  /78 (BP Location: Right arm, Patient Position: Sitting, Cuff Size: Standard)   Pulse 71   Temp 98 °F (36 7 °C) (Oral)   Ht 6' 0 75" (1 848 m)   Wt (!) 179 kg (394 lb)   SpO2 97%   BMI 52 34 kg/m²     No results found for this or any previous visit (from the past 1344 hour(s))  Physical Exam   Constitutional: He is oriented to person, place, and time  He appears well-developed and well-nourished  No distress  HENT:   Head: Normocephalic and atraumatic  Eyes: Pupils are equal, round, and reactive to light  Conjunctivae and EOM are normal  Right eye exhibits no discharge  Left eye exhibits no discharge  No scleral icterus  Neck: Normal range of motion  Neck supple  No JVD present  No thyromegaly present  Cardiovascular: Normal rate, regular rhythm, normal heart sounds and intact distal pulses  Exam reveals no gallop and no friction rub  No murmur heard  Pulmonary/Chest: Effort normal and breath sounds normal  No respiratory distress  He has no wheezes  He has no rales  He exhibits no tenderness  Abdominal: Soft  Bowel sounds are normal  He exhibits no distension and no mass  There is no tenderness  There is no rebound and no guarding  Musculoskeletal: Normal range of motion  He exhibits no edema or deformity  Right wrist: He exhibits tenderness (Pain elicited with flexion of thumb, localized to ulner head  No erythema or eccyhmosis  )  He exhibits normal range of motion  Lymphadenopathy:     He has no cervical adenopathy     Neurological: He is alert and oriented to person, place, and time  He has normal reflexes  No cranial nerve deficit  Coordination normal    Skin: Skin is warm and dry  No rash noted  He is not diaphoretic  No erythema  No pallor  Psychiatric: He has a normal mood and affect  His behavior is normal  Judgment and thought content normal    Nursing note and vitals reviewed

## 2019-03-21 NOTE — ASSESSMENT & PLAN NOTE
Recommend home ROM, stretching, and strength training exercises as tolerated  Take tylenol/NSAIDs PRN

## 2019-04-02 ENCOUNTER — OFFICE VISIT (OUTPATIENT)
Dept: INTERNAL MEDICINE CLINIC | Facility: CLINIC | Age: 39
End: 2019-04-02
Payer: COMMERCIAL

## 2019-04-02 VITALS
DIASTOLIC BLOOD PRESSURE: 72 MMHG | HEIGHT: 73 IN | TEMPERATURE: 98.2 F | SYSTOLIC BLOOD PRESSURE: 136 MMHG | BODY MASS INDEX: 41.75 KG/M2 | HEART RATE: 82 BPM | WEIGHT: 315 LBS | OXYGEN SATURATION: 96 %

## 2019-04-02 DIAGNOSIS — M54.50 ACUTE RIGHT-SIDED LOW BACK PAIN WITHOUT SCIATICA: Primary | ICD-10-CM

## 2019-04-02 PROCEDURE — 1036F TOBACCO NON-USER: CPT | Performed by: INTERNAL MEDICINE

## 2019-04-02 PROCEDURE — 3008F BODY MASS INDEX DOCD: CPT | Performed by: INTERNAL MEDICINE

## 2019-04-02 PROCEDURE — 99213 OFFICE O/P EST LOW 20 MIN: CPT | Performed by: INTERNAL MEDICINE

## 2019-04-02 RX ORDER — METHOCARBAMOL 750 MG/1
750 TABLET, FILM COATED ORAL EVERY 6 HOURS PRN
Qty: 30 TABLET | Refills: 1 | Status: SHIPPED | OUTPATIENT
Start: 2019-04-02 | End: 2020-03-12 | Stop reason: ALTCHOICE

## 2019-04-26 ENCOUNTER — OFFICE VISIT (OUTPATIENT)
Dept: INTERNAL MEDICINE CLINIC | Facility: CLINIC | Age: 39
End: 2019-04-26
Payer: COMMERCIAL

## 2019-04-26 VITALS
BODY MASS INDEX: 41.75 KG/M2 | OXYGEN SATURATION: 95 % | TEMPERATURE: 98.1 F | DIASTOLIC BLOOD PRESSURE: 88 MMHG | SYSTOLIC BLOOD PRESSURE: 142 MMHG | HEART RATE: 68 BPM | WEIGHT: 315 LBS | HEIGHT: 73 IN

## 2019-04-26 DIAGNOSIS — Z13.5 SCREENING FOR DIABETIC RETINOPATHY: ICD-10-CM

## 2019-04-26 DIAGNOSIS — J02.9 PHARYNGITIS, UNSPECIFIED ETIOLOGY: Primary | ICD-10-CM

## 2019-04-26 PROCEDURE — 99213 OFFICE O/P EST LOW 20 MIN: CPT | Performed by: NURSE PRACTITIONER

## 2019-04-26 RX ORDER — FLUTICASONE PROPIONATE 50 MCG
1 SPRAY, SUSPENSION (ML) NASAL DAILY
Qty: 1 BOTTLE | Refills: 2 | Status: SHIPPED | OUTPATIENT
Start: 2019-04-26 | End: 2020-03-12 | Stop reason: ALTCHOICE

## 2019-04-26 RX ORDER — FEXOFENADINE HCL 180 MG/1
180 TABLET ORAL DAILY
Qty: 30 TABLET | Refills: 1 | Status: SHIPPED | OUTPATIENT
Start: 2019-04-26 | End: 2019-07-12

## 2019-04-27 DIAGNOSIS — E11.8 TYPE 2 DIABETES MELLITUS WITH COMPLICATION, UNSPECIFIED WHETHER LONG TERM INSULIN USE: ICD-10-CM

## 2019-05-07 ENCOUNTER — OFFICE VISIT (OUTPATIENT)
Dept: INTERNAL MEDICINE CLINIC | Facility: CLINIC | Age: 39
End: 2019-05-07
Payer: COMMERCIAL

## 2019-05-07 VITALS
OXYGEN SATURATION: 96 % | HEIGHT: 73 IN | BODY MASS INDEX: 41.75 KG/M2 | HEART RATE: 70 BPM | DIASTOLIC BLOOD PRESSURE: 88 MMHG | WEIGHT: 315 LBS | SYSTOLIC BLOOD PRESSURE: 128 MMHG | TEMPERATURE: 98.2 F

## 2019-05-07 DIAGNOSIS — B49 FUNGAL INFECTION: ICD-10-CM

## 2019-05-07 DIAGNOSIS — L03.115 CELLULITIS OF RIGHT LOWER EXTREMITY: Primary | ICD-10-CM

## 2019-05-07 DIAGNOSIS — E11.8 TYPE 2 DIABETES MELLITUS WITH COMPLICATION, UNSPECIFIED WHETHER LONG TERM INSULIN USE: ICD-10-CM

## 2019-05-07 DIAGNOSIS — L03.116 CELLULITIS OF LEFT LOWER EXTREMITY: ICD-10-CM

## 2019-05-07 PROCEDURE — 1036F TOBACCO NON-USER: CPT | Performed by: NURSE PRACTITIONER

## 2019-05-07 PROCEDURE — 3008F BODY MASS INDEX DOCD: CPT | Performed by: NURSE PRACTITIONER

## 2019-05-07 PROCEDURE — 99213 OFFICE O/P EST LOW 20 MIN: CPT | Performed by: NURSE PRACTITIONER

## 2019-05-07 RX ORDER — CEPHALEXIN 500 MG/1
500 CAPSULE ORAL EVERY 6 HOURS SCHEDULED
Qty: 28 CAPSULE | Refills: 0 | Status: SHIPPED | OUTPATIENT
Start: 2019-05-07 | End: 2019-05-14

## 2019-05-07 RX ORDER — KETOCONAZOLE 20 MG/G
CREAM TOPICAL
Qty: 15 G | Refills: 0 | Status: SHIPPED | OUTPATIENT
Start: 2019-05-07 | End: 2020-03-12 | Stop reason: ALTCHOICE

## 2019-05-11 ENCOUNTER — TELEPHONE (OUTPATIENT)
Dept: OTHER | Facility: OTHER | Age: 39
End: 2019-05-11

## 2019-05-30 DIAGNOSIS — N52.8 OTHER MALE ERECTILE DYSFUNCTION: ICD-10-CM

## 2019-05-30 RX ORDER — SILDENAFIL 100 MG/1
TABLET, FILM COATED ORAL
Qty: 10 TABLET | Refills: 0 | OUTPATIENT
Start: 2019-05-30

## 2019-07-05 ENCOUNTER — CLINICAL SUPPORT (OUTPATIENT)
Dept: INTERNAL MEDICINE CLINIC | Facility: CLINIC | Age: 39
End: 2019-07-05
Payer: COMMERCIAL

## 2019-07-05 DIAGNOSIS — I10 ESSENTIAL HYPERTENSION: ICD-10-CM

## 2019-07-05 DIAGNOSIS — E11.9 TYPE 2 DIABETES MELLITUS WITHOUT COMPLICATION, WITHOUT LONG-TERM CURRENT USE OF INSULIN (HCC): ICD-10-CM

## 2019-07-05 DIAGNOSIS — E66.01 MORBID OBESITY (HCC): Primary | ICD-10-CM

## 2019-07-05 PROCEDURE — 36415 COLL VENOUS BLD VENIPUNCTURE: CPT

## 2019-07-06 LAB
ALBUMIN SERPL-MCNC: 4.1 G/DL (ref 3.5–5.5)
ALBUMIN/CREAT UR: 4.9 MG/G CREAT (ref 0–30)
ALBUMIN/GLOB SERPL: 1.5 {RATIO} (ref 1.2–2.2)
ALP SERPL-CCNC: 83 IU/L (ref 39–117)
ALT SERPL-CCNC: 47 IU/L (ref 0–44)
AST SERPL-CCNC: 24 IU/L (ref 0–40)
BASOPHILS # BLD AUTO: 0 X10E3/UL (ref 0–0.2)
BASOPHILS NFR BLD AUTO: 0 %
BILIRUB SERPL-MCNC: 0.6 MG/DL (ref 0–1.2)
BUN SERPL-MCNC: 10 MG/DL (ref 6–20)
BUN/CREAT SERPL: 13 (ref 9–20)
CALCIUM SERPL-MCNC: 9.1 MG/DL (ref 8.7–10.2)
CHLORIDE SERPL-SCNC: 97 MMOL/L (ref 96–106)
CHOLEST SERPL-MCNC: 183 MG/DL (ref 100–199)
CHOLEST/HDLC SERPL: 4.9 RATIO (ref 0–5)
CO2 SERPL-SCNC: 32 MMOL/L (ref 20–29)
CREAT SERPL-MCNC: 0.79 MG/DL (ref 0.76–1.27)
CREAT UR-MCNC: 96.6 MG/DL
EOSINOPHIL # BLD AUTO: 0.4 X10E3/UL (ref 0–0.4)
EOSINOPHIL NFR BLD AUTO: 4 %
ERYTHROCYTE [DISTWIDTH] IN BLOOD BY AUTOMATED COUNT: 15 % (ref 12.3–15.4)
EST. AVERAGE GLUCOSE BLD GHB EST-MCNC: 123 MG/DL
GLOBULIN SER-MCNC: 2.8 G/DL (ref 1.5–4.5)
GLUCOSE SERPL-MCNC: 95 MG/DL (ref 65–99)
HBA1C MFR BLD: 5.9 % (ref 4.8–5.6)
HCT VFR BLD AUTO: 48.4 % (ref 37.5–51)
HDLC SERPL-MCNC: 37 MG/DL
HGB BLD-MCNC: 16.1 G/DL (ref 13–17.7)
IMM GRANULOCYTES # BLD: 0 X10E3/UL (ref 0–0.1)
IMM GRANULOCYTES NFR BLD: 0 %
LDLC SERPL CALC-MCNC: 116 MG/DL (ref 0–99)
LYMPHOCYTES # BLD AUTO: 1.8 X10E3/UL (ref 0.7–3.1)
LYMPHOCYTES NFR BLD AUTO: 20 %
MCH RBC QN AUTO: 30 PG (ref 26.6–33)
MCHC RBC AUTO-ENTMCNC: 33.3 G/DL (ref 31.5–35.7)
MCV RBC AUTO: 90 FL (ref 79–97)
MICROALBUMIN UR-MCNC: 4.7 UG/ML
MONOCYTES # BLD AUTO: 0.5 X10E3/UL (ref 0.1–0.9)
MONOCYTES NFR BLD AUTO: 5 %
NEUTROPHILS # BLD AUTO: 6.6 X10E3/UL (ref 1.4–7)
NEUTROPHILS NFR BLD AUTO: 71 %
PLATELET # BLD AUTO: 256 X10E3/UL (ref 150–450)
POTASSIUM SERPL-SCNC: 5.2 MMOL/L (ref 3.5–5.2)
PROT SERPL-MCNC: 6.9 G/DL (ref 6–8.5)
RBC # BLD AUTO: 5.36 X10E6/UL (ref 4.14–5.8)
SL AMB EGFR AFRICAN AMERICAN: 133 ML/MIN/1.73
SL AMB EGFR NON AFRICAN AMERICAN: 115 ML/MIN/1.73
SL AMB VLDL CHOLESTEROL CALC: 30 MG/DL (ref 5–40)
SODIUM SERPL-SCNC: 140 MMOL/L (ref 134–144)
TRIGL SERPL-MCNC: 151 MG/DL (ref 0–149)
TSH SERPL DL<=0.005 MIU/L-ACNC: 1.64 UIU/ML (ref 0.45–4.5)
WBC # BLD AUTO: 9.4 X10E3/UL (ref 3.4–10.8)

## 2019-07-12 ENCOUNTER — OFFICE VISIT (OUTPATIENT)
Dept: INTERNAL MEDICINE CLINIC | Facility: CLINIC | Age: 39
End: 2019-07-12
Payer: COMMERCIAL

## 2019-07-12 VITALS
HEART RATE: 77 BPM | OXYGEN SATURATION: 97 % | WEIGHT: 315 LBS | BODY MASS INDEX: 52.8 KG/M2 | TEMPERATURE: 97.6 F | DIASTOLIC BLOOD PRESSURE: 86 MMHG | SYSTOLIC BLOOD PRESSURE: 126 MMHG

## 2019-07-12 DIAGNOSIS — E66.01 MORBID OBESITY (HCC): Chronic | ICD-10-CM

## 2019-07-12 DIAGNOSIS — N52.8 OTHER MALE ERECTILE DYSFUNCTION: ICD-10-CM

## 2019-07-12 DIAGNOSIS — E11.9 TYPE 2 DIABETES MELLITUS WITHOUT COMPLICATION, WITHOUT LONG-TERM CURRENT USE OF INSULIN (HCC): Primary | Chronic | ICD-10-CM

## 2019-07-12 DIAGNOSIS — M77.8 RIGHT WRIST TENDINITIS: ICD-10-CM

## 2019-07-12 DIAGNOSIS — M54.16 LUMBAR RADICULITIS: ICD-10-CM

## 2019-07-12 DIAGNOSIS — I10 ESSENTIAL HYPERTENSION: ICD-10-CM

## 2019-07-12 DIAGNOSIS — R29.898 WEAKNESS OF LEFT LOWER EXTREMITY: ICD-10-CM

## 2019-07-12 PROBLEM — M54.50 ACUTE RIGHT-SIDED LOW BACK PAIN WITHOUT SCIATICA: Status: RESOLVED | Noted: 2019-04-02 | Resolved: 2019-07-12

## 2019-07-12 PROBLEM — L03.115 CELLULITIS OF RIGHT LOWER EXTREMITY: Status: RESOLVED | Noted: 2019-05-07 | Resolved: 2019-07-12

## 2019-07-12 PROBLEM — B49 FUNGAL INFECTION: Status: RESOLVED | Noted: 2019-05-07 | Resolved: 2019-07-12

## 2019-07-12 PROBLEM — M48.00 CENTRAL STENOSIS OF SPINAL CANAL: Status: RESOLVED | Noted: 2017-04-13 | Resolved: 2019-07-12

## 2019-07-12 PROBLEM — J02.9 PHARYNGITIS: Status: RESOLVED | Noted: 2019-04-26 | Resolved: 2019-07-12

## 2019-07-12 PROBLEM — L03.116 CELLULITIS OF LEFT LOWER EXTREMITY: Status: RESOLVED | Noted: 2019-05-07 | Resolved: 2019-07-12

## 2019-07-12 PROBLEM — M48.07 FORAMINAL STENOSIS OF LUMBOSACRAL REGION: Status: RESOLVED | Noted: 2017-03-21 | Resolved: 2019-07-12

## 2019-07-12 PROCEDURE — 3074F SYST BP LT 130 MM HG: CPT | Performed by: INTERNAL MEDICINE

## 2019-07-12 PROCEDURE — 99215 OFFICE O/P EST HI 40 MIN: CPT | Performed by: INTERNAL MEDICINE

## 2019-07-12 RX ORDER — SILDENAFIL 50 MG/1
TABLET, FILM COATED ORAL
Refills: 2 | COMMUNITY
Start: 2019-05-30 | End: 2019-07-12

## 2019-07-12 RX ORDER — SILDENAFIL 100 MG/1
100 TABLET, FILM COATED ORAL DAILY PRN
Qty: 10 TABLET | Refills: 0 | Status: SHIPPED | OUTPATIENT
Start: 2019-07-12 | End: 2019-10-19 | Stop reason: SDUPTHER

## 2019-07-12 NOTE — ASSESSMENT & PLAN NOTE
Lab Results   Component Value Date    HGBA1C 5 9 (H) 07/05/2019      Continue with metformin 500 mg twice a day  He has an appointment with a dietitian this upcoming week  Discussed lifestyle modifications with diet, exercise, and weight loss

## 2019-07-12 NOTE — ASSESSMENT & PLAN NOTE
Will order an x-ray of the right wrist as well as an ultrasound for evaluation of tendons  Will follow-up imaging study results and refer to Physical therapy

## 2019-07-12 NOTE — ASSESSMENT & PLAN NOTE
Continue with home stretching exercises as well as methocarbamol 750 to be taken every 6 hours as needed for muscle spasms

## 2019-07-12 NOTE — PROGRESS NOTES
Assessment/Plan:    Type 2 diabetes mellitus (HCC)  Lab Results   Component Value Date    HGBA1C 5 9 (H) 07/05/2019      Continue with metformin 500 mg twice a day  He has an appointment with a dietitian this upcoming week  Discussed lifestyle modifications with diet, exercise, and weight loss  Essential hypertension  Controlled  Continue to monitor off antihypertensives  Lumbar radiculitis  Continue with home stretching exercises as well as methocarbamol 750 to be taken every 6 hours as needed for muscle spasms  Right wrist tendinitis  Will order an x-ray of the right wrist as well as an ultrasound for evaluation of tendons  Will follow-up imaging study results and refer to Physical therapy  Other male erectile dysfunction  Continue with sildenafil 100 mg daily as needed  Diagnoses and all orders for this visit:    Type 2 diabetes mellitus without complication, without long-term current use of insulin ([facility])  -     Hemoglobin A1C; Future    Essential hypertension    Lumbar radiculitis    Weakness of left lower extremity    Right wrist tendinitis  -     US MSK limited; Future  -     XR wrist 3+ vw right; Future  -     Ambulatory referral to Physical Therapy; Future    Morbid obesity ([facility] [address] )    Other male erectile dysfunction  -     sildenafil (VIAGRA) 100 mg tablet; Take 1 tablet (100 mg total) by mouth daily as needed for erectile dysfunction    Other orders  -     Discontinue: sildenafil (VIAGRA) 50 MG tablet; TAKE 1 TABLET (50 MG TOTAL) BY MOUTH DAILY AS NEEDED FOR ERECTILE DYSFUNCTION          Subjective:      Patient ID: Payal Gaytan is a 42 y o  male  68-year-old male is seen today for follow-up of chronic conditions  Laboratory studies reviewed today, he did have mild elevation in hemoglobin A1c from 5 4% to 5 9%  He has been adherent to metformin 500 mg twice a day    Remaining laboratory studies are within acceptable range (TSH, CMP, CBC, microalbumin creatinine ratio, lipid panel)  He has been having persistent right wrist pain and tenderness, especially with ulnar flexion localized to lateral wrist   Again, he denies any trauma and has been attempting home therapy/stretching exercises without improvement of symptoms  Hypertension   This is a chronic problem  The current episode started more than 1 year ago  The problem is unchanged  The problem is controlled  Pertinent negatives include no anxiety, blurred vision, chest pain, headaches, malaise/fatigue, neck pain, orthopnea, palpitations, peripheral edema, PND, shortness of breath or sweats  Past treatments include nothing  The current treatment provides moderate improvement  Compliance problems include exercise and diet  Diabetes   He presents for his follow-up diabetic visit  He has type 2 diabetes mellitus  His disease course has been stable  There are no hypoglycemic associated symptoms  Pertinent negatives for hypoglycemia include no dizziness, headaches or sweats  There are no diabetic associated symptoms  Pertinent negatives for diabetes include no blurred vision, no chest pain, no fatigue and no weakness  There are no hypoglycemic complications  Symptoms are stable  There are no diabetic complications  Risk factors for coronary artery disease include hypertension, male sex, obesity, diabetes mellitus and dyslipidemia  Current diabetic treatment includes oral agent (monotherapy)  He is following a generally healthy diet  He has had a previous visit with a dietitian (Has an appointment with a dietitian this upcoming week)  He rarely participates in exercise  An ACE inhibitor/angiotensin II receptor blocker is not being taken  He does not see a podiatrist Eye exam is current         The following portions of the patient's history were reviewed and updated as appropriate: allergies, current medications, past family history, past medical history, past social history, past surgical history and problem list     Review of Systems   Constitutional: Negative for activity change, appetite change, chills, diaphoresis, fatigue, fever and malaise/fatigue  HENT: Negative for congestion, postnasal drip, rhinorrhea, sinus pressure, sinus pain, sneezing and sore throat  Eyes: Negative for blurred vision and visual disturbance  Respiratory: Negative for apnea, cough, choking, chest tightness, shortness of breath and wheezing  Cardiovascular: Negative for chest pain, palpitations, orthopnea, leg swelling and PND  Gastrointestinal: Negative for abdominal distention, abdominal pain, anal bleeding, blood in stool, constipation, diarrhea, nausea and vomiting  Endocrine: Negative for cold intolerance and heat intolerance  Genitourinary: Negative for difficulty urinating, dysuria and hematuria  Musculoskeletal: Negative  Negative for neck pain  Skin: Negative  Neurological: Negative for dizziness, weakness, light-headedness, numbness and headaches  Hematological: Negative for adenopathy  Psychiatric/Behavioral: Negative for agitation, sleep disturbance and suicidal ideas  All other systems reviewed and are negative          Past Medical History:   Diagnosis Date    Diabetes mellitus ([facility] )     Diabetes mellitus ([facility] )     other type with circulatory complication, without long-term current    Essential hypertension 12/19/2018    Hearing loss     r ear only    Obesity     Onychomycosis of multiple toenails with type 2 diabetes mellitus ([facility] )     last assessed: 3/21/2017       Current Outpatient Medications:     fluticasone (FLONASE) 50 mcg/act nasal spray, 1 spray into each nostril daily (Patient taking differently: 1 spray into each nostril daily as needed ), Disp: 1 Bottle, Rfl: 2    ketoconazole (NIZORAL) 2 % cream, Apply topically daily at bedtime Apply to feet and ankles, Disp: 15 g, Rfl: 0    metFORMIN (GLUCOPHAGE) 500 mg tablet, Take 1 tablet (500 mg total) by mouth 2 (two) times a day with meals, Disp: 180 tablet, Rfl: 1    methocarbamol (ROBAXIN) 750 mg tablet, Take 1 tablet (750 mg total) by mouth every 6 (six) hours as needed for muscle spasms, Disp: 30 tablet, Rfl: 1    sildenafil (VIAGRA) 100 mg tablet, Take 1 tablet (100 mg total) by mouth daily as needed for erectile dysfunction, Disp: 10 tablet, Rfl: 0    Allergies   Allergen Reactions    Menthol Rash       Social History   Past Surgical History:   Procedure Laterality Date    CHOLANGIOGRAM N/A 10/24/2018    Procedure: CHOLANGIOGRAM;  Surgeon: Nayan Torres MD;  [facility]: [facility];  Service: General    CHOLECYSTECTOMY      CHOLECYSTECTOMY LAPAROSCOPIC N/A 10/24/2018    Procedure: CHOLECYSTECTOMY LAPAROSCOPIC;  Surgeon: Nayan Torres MD;  Location: [facility];  Service: General    MOUTH SURGERY  12/28/2017    TONSILLECTOMY AND ADENOIDECTOMY       Family History   Problem Relation Age of Onset    Diabetes Mother     No Known Problems Father     Stomach cancer Paternal Grandfather        Objective:  /86 (BP Location: Left arm, Patient Position: Sitting, Cuff Size: Standard)   Pulse 77   Temp 97 6 °F (36 4 °C) (Oral)   Wt (!) 182 kg (400 lb 3 2 oz)   SpO2 97% Comment: ra  BMI 52 80 kg/m²     Recent Results (from the past 1344 hour(s))   CBC and differential    Collection Time: 07/05/19  9:53 AM   Result Value Ref Range    White Blood Cell Count 9 4 3 4 - 10 8 x10E3/uL    Red Blood Cell Count 5 36 4 14 - 5 80 x10E6/uL    Hemoglobin 16 1 13 0 - 17 7 g/dL    HCT 48 4 37 5 - 51 0 %    MCV 90 79 - 97 fL    MCH 30 0 26 6 - 33 0 pg    MCHC 33 3 31 5 - 35 7 g/dL    RDW 15 0 12 3 - 15 4 %    Platelet Count 069 985 - 450 x10E3/uL    Neutrophils 71 Not Estab  %    Lymphocytes 20 Not Estab  %    Monocytes 5 Not Estab  %    Eosinophils 4 Not Estab  %    Basophils PCT 0 Not Estab  %    Neutrophils (Absolute) 6 6 1 4 - 7 0 x10E3/uL    Lymphocytes (Absolute) 1 8 0 7 - 3 1 x10E3/uL    Monocytes (Absolute) 0 5 0 1 - 0 9 x10E3/uL    Eosinophils (Absolute) 0 4 0 0 - 0 4 x10E3/uL    Basophils ABS 0 0 0 0 - 0 2 x10E3/uL    Immature Granulocytes 0 Not Estab  %    Immature Granulocytes (Absolute) 0 0 0 0 - 0 1 x10E3/uL   Comprehensive metabolic panel    Collection Time: 07/05/19  9:53 AM   Result Value Ref Range    Glucose, Random 95 65 - 99 mg/dL    BUN 10 6 - 20 mg/dL    Creatinine 0 79 0 76 - 1 27 mg/dL    eGFR Non  115 >59 mL/min/1 73    eGFR  133 >59 mL/min/1 73    SL AMB BUN/CREATININE RATIO 13 9 - 20    Sodium 140 134 - 144 mmol/L    Potassium 5 2 3 5 - 5 2 mmol/L    Chloride 97 96 - 106 mmol/L    CO2 32 (H) 20 - 29 mmol/L    CALCIUM 9 1 8 7 - 10 2 mg/dL    Protein, Total 6 9 6 0 - 8 5 g/dL    Albumin 4 1 3 5 - 5 5 g/dL    Globulin, Total 2 8 1 5 - 4 5 g/dL    Albumin/Globulin Ratio 1 5 1 2 - 2 2    TOTAL BILIRUBIN 0 6 0 0 - 1 2 mg/dL    Alk Phos Isoenzymes 83 39 - 117 IU/L    AST 24 0 - 40 IU/L    ALT 47 (H) 0 - 44 IU/L   Lipid panel    Collection Time: 07/05/19  9:53 AM   Result Value Ref Range    Cholesterol, Total 183 100 - 199 mg/dL    Triglycerides 151 (H) 0 - 149 mg/dL    HDL 37 (L) >39 mg/dL    VLDL Cholesterol Calculated 30 5 - 40 mg/dL    LDL Direct 116 (H) 0 - 99 mg/dL    T  Chol/HDL Ratio 4 9 0 0 - 5 0 ratio   Microalbumin / creatinine urine ratio    Collection Time: 07/05/19  9:53 AM   Result Value Ref Range    Creatinine, Urine 96 6 Not Estab  mg/dL    Microalbum  ,U,Random 4 7 Not Estab  ug/mL    Microalb/Creat Ratio 4 9 0 0 - 30 0 mg/g creat   Hemoglobin A1C    Collection Time: 07/05/19  9:53 AM   Result Value Ref Range    Hemoglobin A1C 5 9 (H) 4 8 - 5 6 %    Estimated Average Glucose 123 mg/dL   TSH, 3rd generation with Free T4 reflex    Collection Time: 07/05/19  9:53 AM   Result Value Ref Range    TSH 1 640 0 450 - 4 500 uIU/mL            Physical Exam   Constitutional: He is oriented to person, place, and time  He appears well-developed and well-nourished  No distress     HENT:   Head: Normocephalic and atraumatic  Eyes: Pupils are equal, round, and reactive to light  Conjunctivae and EOM are normal  Right eye exhibits no discharge  Left eye exhibits no discharge  No scleral icterus  Neck: Normal range of motion  Neck supple  No JVD present  No thyromegaly present  Cardiovascular: Normal rate, regular rhythm, normal heart sounds and intact distal pulses  Exam reveals no gallop and no friction rub  No murmur heard  Pulmonary/Chest: Effort normal and breath sounds normal  No respiratory distress  He has no wheezes  He has no rales  He exhibits no tenderness  Abdominal: Soft  Bowel sounds are normal  He exhibits no distension and no mass  There is no tenderness  There is no rebound and no guarding  Musculoskeletal: He exhibits no edema or deformity  Right wrist: He exhibits decreased range of motion  He exhibits no tenderness, no bony tenderness, no swelling, no effusion, no crepitus, no deformity and no laceration  Arms:  Lymphadenopathy:     He has no cervical adenopathy  Neurological: He is alert and oriented to person, place, and time  He has normal reflexes  No cranial nerve deficit  Coordination normal    Skin: Skin is warm and dry  No rash noted  He is not diaphoretic  No erythema  No pallor  Psychiatric: He has a normal mood and affect  His behavior is normal  Judgment and thought content normal    Nursing note and vitals reviewed

## 2019-07-16 ENCOUNTER — HOSPITAL ENCOUNTER (OUTPATIENT)
Dept: RADIOLOGY | Facility: HOSPITAL | Age: 39
Discharge: HOME | End: 2019-07-16
Attending: INTERNAL MEDICINE
Payer: COMMERCIAL

## 2019-07-16 ENCOUNTER — TRANSCRIBE ORDERS (OUTPATIENT)
Dept: RADIOLOGY | Facility: HOSPITAL | Age: 39
End: 2019-07-16

## 2019-07-16 DIAGNOSIS — M77.8 RIGHT WRIST TENDINITIS: ICD-10-CM

## 2019-07-16 PROCEDURE — 76882 US LMTD JT/FCL EVL NVASC XTR: CPT

## 2019-07-16 PROCEDURE — 73110 X-RAY EXAM OF WRIST: CPT

## 2019-07-17 DIAGNOSIS — M65.4 DE QUERVAIN'S TENOSYNOVITIS, RIGHT: Primary | ICD-10-CM

## 2019-07-22 ENCOUNTER — EVALUATION (OUTPATIENT)
Dept: PHYSICAL THERAPY | Facility: REHABILITATION | Age: 39
End: 2019-07-22
Payer: COMMERCIAL

## 2019-07-22 DIAGNOSIS — M25.531 RIGHT WRIST PAIN: ICD-10-CM

## 2019-07-22 DIAGNOSIS — M65.4 DE QUERVAIN'S TENOSYNOVITIS, RIGHT: Primary | ICD-10-CM

## 2019-07-22 DIAGNOSIS — M65.4 DE QUERVAIN'S DISEASE (TENOSYNOVITIS): ICD-10-CM

## 2019-07-22 DIAGNOSIS — M77.8 RIGHT WRIST TENDINITIS: Primary | ICD-10-CM

## 2019-07-22 PROCEDURE — 97161 PT EVAL LOW COMPLEX 20 MIN: CPT | Performed by: PHYSICAL THERAPIST

## 2019-07-22 PROCEDURE — 97110 THERAPEUTIC EXERCISES: CPT | Performed by: PHYSICAL THERAPIST

## 2019-07-22 PROCEDURE — 97140 MANUAL THERAPY 1/> REGIONS: CPT | Performed by: PHYSICAL THERAPIST

## 2019-07-22 PROCEDURE — 97014 ELECTRIC STIMULATION THERAPY: CPT | Performed by: PHYSICAL THERAPIST

## 2019-07-22 RX ORDER — MELOXICAM 7.5 MG/1
TABLET ORAL
Qty: 60 TABLET | Refills: 0 | Status: SHIPPED | OUTPATIENT
Start: 2019-07-22 | End: 2020-03-12 | Stop reason: ALTCHOICE

## 2019-07-22 NOTE — PROGRESS NOTES
PT Evaluation     Today's date: 2019  Patient name: Dunia Dash  : 1980  MRN: [id number]  Referring provider: Vinnie Valle MD  Dx:   Encounter Diagnosis     ICD-10-CM    1  Right wrist tendinitis M77 8    2  Right wrist pain M25 531    3  De Quervain's disease (tenosynovitis) M65 4        Start Time: 6959  Stop Time: 3824  Total time in clinic (min): 60 minutes    Assessment/Plan  Dunia Dash is a 42 y o  male who was referred to physical therapy for management of right wrist pain secondary to De Quervain's syndrome  Primary impairments include decreased thumb strength with increased pain that can exacerbate to 6/10 on NPRS  Consequently, patient has difficulty completing work-related ADLs as a  at the [employer] would benefit from skilled intervention to address all deficits and improve functional capability  Patient is a good candidate for therapy, pending compliance with HEP and 2x weekly participation  Thank you for the referral and please do not hesitate to contact me with any questions or concerns regarding [facility] care! Plan  Frequency:2x week   Duration in visits: 8 weeks   Duration in weeks: 4   Therapeutic exercise/activity, neuromuscular reeducation, manual therapy, and modalities  Patient understands and agrees to plan of care  Goals  Short Term--4 weeks  1  1/2 point increase in MMT score  2  2 point decrease in pain  Long Term--By Discharge  1  Pain <2/10 at its worst  2  Patient will achieve expected FOTO score  Patient's Goal: Work and bowl without onset of pain  Subjective  History   Date of Onset: 3+ weeks ago Description: Gradual, insidious onset of lateral wrist pain that continued to worsen  CT scan showed inflammation and radiographs were negative for bone spur  He was prescribed Meloxicam, which he will  from the pharmacy following this appointment, and referred to physical therapy    He is also scheduled for a CSI  Symptoms  Intermittent lateral wrist pain that is worse with thumb flexion + wrist ulnar deviation  He is required to perform this movement multiple times at work, as he is a   Patient reports temporary relief with OTC NSAIDs, ice, and epsom salt soaks  0/10 best  6/10 at its worst      [address] lives with his girlfriend   Patient is a   Physical job duties include prolonged standing and repetitive wrist movements  Objective     Cervical:  ROM WNL and did not reproduce any sx  In addition, all special testing was negative  MMT    Shoulder       L       R   Flex  Extn  Abd  Add  IR      ER                   MMT    Elbow       L       R   Flex  Extn  Sup  Pron  MMT         AROM    Wrist       L       R        L           R   Flex  5 5     Extn  5 5       85# 100#     Thumb ext 5 4- P! WNL WNL   Thumb flex 5 4+ WNL WNL   Thumb abd 5 4- P!  WNL WNL   Thumb add 5 4+ WNL WNL             ULTTs:  intact      Wrist/hand:  Finkelstein=  Positive R           Precautions: DM      7/22 Patient education re: pathoanatomy and activity modification (15 min)  Manual              Gentle IASTM 8'                                                                    Exercise Diary                                                                                                                                                                                                                                                                                      Modalities              CP+tens 10'

## 2019-08-01 ENCOUNTER — OFFICE VISIT (OUTPATIENT)
Dept: PHYSICAL THERAPY | Facility: REHABILITATION | Age: 39
End: 2019-08-01
Payer: COMMERCIAL

## 2019-08-01 DIAGNOSIS — M65.4 DE QUERVAIN'S DISEASE (TENOSYNOVITIS): ICD-10-CM

## 2019-08-01 DIAGNOSIS — M25.531 RIGHT WRIST PAIN: ICD-10-CM

## 2019-08-01 DIAGNOSIS — M77.8 RIGHT WRIST TENDINITIS: Primary | ICD-10-CM

## 2019-08-01 PROCEDURE — 97110 THERAPEUTIC EXERCISES: CPT

## 2019-08-01 PROCEDURE — 97140 MANUAL THERAPY 1/> REGIONS: CPT

## 2019-08-01 PROCEDURE — 97014 ELECTRIC STIMULATION THERAPY: CPT

## 2019-08-01 NOTE — PROGRESS NOTES
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  Objective: See treatment diary below      Assessment: Tolerated treatment well  Patient would benefit from continued PT   Mild soreness after ex  Better after TENS & ice  Rock tape applied for support  Plan: Continue per plan of care        Precautions: DM        Manual   8-1           Gentle IASTM 8' 10'           Rock tape  CD                                                      Exercise Diary                           TP   Pink 10x           TP opp  Pink 10x ea finger           TP gross finger flx & ext  Pink 10x                                                                                                                                                                                                                               Modalities              CP+tens 10' x                                     X=performed

## 2019-08-03 DIAGNOSIS — J02.9 PHARYNGITIS, UNSPECIFIED ETIOLOGY: ICD-10-CM

## 2019-08-04 RX ORDER — FLUTICASONE PROPIONATE 50 MCG
SPRAY, SUSPENSION (ML) NASAL
Qty: 16 ML | Refills: 2 | OUTPATIENT
Start: 2019-08-04

## 2019-08-15 ENCOUNTER — OFFICE VISIT (OUTPATIENT)
Dept: PHYSICAL THERAPY | Facility: REHABILITATION | Age: 39
End: 2019-08-15
Payer: COMMERCIAL

## 2019-08-15 DIAGNOSIS — M65.4 DE QUERVAIN'S DISEASE (TENOSYNOVITIS): ICD-10-CM

## 2019-08-15 DIAGNOSIS — M25.531 RIGHT WRIST PAIN: ICD-10-CM

## 2019-08-15 DIAGNOSIS — M77.8 RIGHT WRIST TENDINITIS: Primary | ICD-10-CM

## 2019-08-15 PROCEDURE — 97110 THERAPEUTIC EXERCISES: CPT

## 2019-08-15 PROCEDURE — 97140 MANUAL THERAPY 1/> REGIONS: CPT

## 2019-08-15 NOTE — PROGRESS NOTES
Daily Note     Today's date: 8/15/2019  Patient name: Abdiel Chen  : 1980  MRN: [id number]  Referring provider: Frank Stringer MD  Dx:   Encounter Diagnosis     ICD-10-CM    1  Right wrist tendinitis M77 8    2  Right wrist pain M25 531    3  De Quervain's disease (tenosynovitis) M65 4                   Subjective: Patient noted good days and bad days  Objective: See treatment diary below      Assessment: Tolerated treatment fair  Patient exhibited good technique with therapeutic exercises and would benefit from continued PT  Gave patient putty to perform exercises at home  TENs and CP applied at end of treatment  Plan: Continue per plan of care        Precautions: DM        Manual  7-22 8-1 8/15          Gentle IASTM 8' 10' 10'          Rock tape  CD CD                                                     Exercise Diary    8/15                       TP   Pink 10x Pink 10x           TP opp  Pink 10x ea finger Pink 10x ea finger          TP gross finger flx & ext  Pink 10x Pink 10x                                                                                                                                                                                                                              Modalities              CP+tens 10' x x                                    X=performed

## 2019-08-18 DIAGNOSIS — M65.4 DE QUERVAIN'S TENOSYNOVITIS, RIGHT: ICD-10-CM

## 2019-08-19 RX ORDER — MELOXICAM 7.5 MG/1
TABLET ORAL
Qty: 60 TABLET | Refills: 0 | OUTPATIENT
Start: 2019-08-19

## 2019-08-20 DIAGNOSIS — E11.8 TYPE 2 DIABETES MELLITUS WITH COMPLICATION, UNSPECIFIED WHETHER LONG TERM INSULIN USE: ICD-10-CM

## 2019-09-17 DIAGNOSIS — J02.9 PHARYNGITIS, UNSPECIFIED ETIOLOGY: ICD-10-CM

## 2019-09-18 RX ORDER — FLUTICASONE PROPIONATE 50 MCG
SPRAY, SUSPENSION (ML) NASAL
Qty: 16 ML | Refills: 2 | OUTPATIENT
Start: 2019-09-18

## 2019-10-19 DIAGNOSIS — N52.8 OTHER MALE ERECTILE DYSFUNCTION: ICD-10-CM

## 2019-10-19 DIAGNOSIS — E11.8 TYPE 2 DIABETES MELLITUS WITH COMPLICATION (HCC): ICD-10-CM

## 2019-10-21 RX ORDER — SILDENAFIL 100 MG/1
100 TABLET, FILM COATED ORAL DAILY PRN
Qty: 10 TABLET | Refills: 0 | Status: SHIPPED | OUTPATIENT
Start: 2019-10-21 | End: 2020-12-04

## 2020-03-12 ENCOUNTER — TRANSCRIBE ORDERS (OUTPATIENT)
Dept: ADMINISTRATIVE | Facility: HOSPITAL | Age: 40
End: 2020-03-12

## 2020-03-12 ENCOUNTER — HOSPITAL ENCOUNTER (OUTPATIENT)
Dept: RADIOLOGY | Facility: IMAGING CENTER | Age: 40
Discharge: HOME | End: 2020-03-12
Payer: COMMERCIAL

## 2020-03-12 ENCOUNTER — OFFICE VISIT (OUTPATIENT)
Dept: INTERNAL MEDICINE CLINIC | Facility: CLINIC | Age: 40
End: 2020-03-12
Payer: COMMERCIAL

## 2020-03-12 VITALS
DIASTOLIC BLOOD PRESSURE: 88 MMHG | TEMPERATURE: 97.6 F | OXYGEN SATURATION: 95 % | SYSTOLIC BLOOD PRESSURE: 128 MMHG | HEART RATE: 80 BPM | HEIGHT: 72 IN | BODY MASS INDEX: 42.66 KG/M2 | WEIGHT: 315 LBS

## 2020-03-12 DIAGNOSIS — E11.9 TYPE 2 DIABETES MELLITUS WITHOUT COMPLICATION, WITHOUT LONG-TERM CURRENT USE OF INSULIN (HCC): Primary | ICD-10-CM

## 2020-03-12 DIAGNOSIS — M25.561 ACUTE PAIN OF RIGHT KNEE: ICD-10-CM

## 2020-03-12 DIAGNOSIS — I10 ESSENTIAL HYPERTENSION: ICD-10-CM

## 2020-03-12 PROCEDURE — 36415 COLL VENOUS BLD VENIPUNCTURE: CPT

## 2020-03-12 PROCEDURE — 73564 X-RAY EXAM KNEE 4 OR MORE: CPT

## 2020-03-12 PROCEDURE — 1036F TOBACCO NON-USER: CPT | Performed by: NURSE PRACTITIONER

## 2020-03-12 PROCEDURE — 99213 OFFICE O/P EST LOW 20 MIN: CPT | Performed by: NURSE PRACTITIONER

## 2020-03-12 PROCEDURE — 3008F BODY MASS INDEX DOCD: CPT | Performed by: NURSE PRACTITIONER

## 2020-03-12 PROCEDURE — 3074F SYST BP LT 130 MM HG: CPT | Performed by: NURSE PRACTITIONER

## 2020-03-12 PROCEDURE — 3079F DIAST BP 80-89 MM HG: CPT | Performed by: NURSE PRACTITIONER

## 2020-03-12 RX ORDER — MELOXICAM 15 MG/1
15 TABLET ORAL DAILY
Qty: 30 TABLET | Refills: 0 | Status: SHIPPED | OUTPATIENT
Start: 2020-03-12 | End: 2020-04-08 | Stop reason: SDUPTHER

## 2020-03-12 NOTE — PROGRESS NOTES
Assessment/Plan:    Type 2 diabetes mellitus ([place] [address] )    Lab Results   Component Value Date    HGBA1C 5 9 (H) 07/05/2019   Will get updated blood work, patient currently on metformin 500 mg twice daily for weight loss  Patient is to continue to work on diet and exercise  Limit sugars and carbohydrate intake  Avoid soda, juice, sweets, cookies, desserts, pasta, bread    Eat more whole grains, exercised 30 min of cardio at least 3 times a week  Also recommended daily foot exams to check for sores, and recommended yearly eye exams  Acute pain of right knee  Will place referral to physical therapy, will get x-ray of right knee, patient is to start Mobic 15 mg tablet once daily, advised patient to take this medication with food and not to take with any other NSAIDs  Patient is to elevate his lower extremity and use ice when possible, and get a compression sleeve for his knee  BMI Counseling: Body mass index is 58 73 kg/m²  The BMI is above normal  Nutrition recommendations include decreasing portion sizes, encouraging healthy choices of fruits and vegetables, decreasing fast food intake, consuming healthier snacks, limiting drinks that contain sugar, moderation in carbohydrate intake, increasing intake of lean protein, reducing intake of saturated and trans fat and reducing intake of cholesterol  Exercise recommendations include moderate physical activity 150 minutes/week and exercising 3-5 times per week  Diagnoses and all orders for this visit:    Type 2 diabetes mellitus without complication, without long-term current use of insulin ([place] [address] )  -     Ambulatory referral to Ophthalmology;  Future  -     Cancel: Hemoglobin A1C  -     Cancel: Lipid panel  -     Cancel: Lipid panel  -     Cancel: Hemoglobin A1C  -     Cancel: CBC and differential  -     Cancel: Comprehensive metabolic panel  -     Comprehensive metabolic panel  -     CBC and differential  -     Hemoglobin A1C  -     Lipid panel    Essential hypertension  -     Cancel: Comprehensive metabolic panel; Future  -     Cancel: CBC and differential    Acute pain of right knee  -     XR knee 4+ vw right injury; Future  -     meloxicam (MOBIC) 15 mg tablet; Take 1 tablet (15 mg total) by mouth daily  -     Ambulatory referral to Physical Therapy; Future    Other orders  -     Cancel: Lipid panel  -     Cancel: Hemoglobin A1C  -     Cancel: CBC and differential  -     Cancel: Comprehensive metabolic panel; Future          Subjective:      Patient ID: Dimitry Mckinney is a 47 y o  male  Patient presents today with complaints of right knee pain  Was sitting on step and pushed up and felt like he twisted his knee, hurts on the medial aspect of right knee  Denies hearing a pop, he hears a crack when he stands up  Knee Pain    Incident onset: 5 days ago  Incident location: patient was bowling  The pain is present in the right knee  The pain is at a severity of 8/10  Pertinent negatives include no inability to bear weight, loss of motion, loss of sensation, muscle weakness, numbness or tingling  He has tried ice (advil) for the symptoms  The treatment provided no relief  The following portions of the patient's history were reviewed and updated as appropriate: allergies, current medications, past family history, past medical history, past social history, past surgical history and problem list     Review of Systems   Constitutional: Negative for activity change, appetite change, chills, diaphoresis and fever  HENT: Negative for congestion, ear discharge, ear pain, postnasal drip, rhinorrhea, sinus pressure, sinus pain and sore throat  Eyes: Negative for pain, discharge, itching and visual disturbance  Respiratory: Negative for cough, chest tightness, shortness of breath and wheezing  Cardiovascular: Negative for chest pain, palpitations and leg swelling     Gastrointestinal: Negative for abdominal pain, blood in stool, constipation, diarrhea, nausea and vomiting  Endocrine: Negative for polydipsia, polyphagia and polyuria  Genitourinary: Negative for difficulty urinating, dysuria, hematuria and urgency  Musculoskeletal: Positive for arthralgias (right knee pain)  Negative for back pain and neck pain  Skin: Negative for rash and wound  Neurological: Negative for dizziness, tingling, weakness, numbness and headaches           Past Medical History:   Diagnosis Date    Diabetes mellitus ([place] [address] )     Diabetes mellitus ([place] [address] )     other type with circulatory complication, without long-term current    Essential hypertension 12/19/2018    Hearing loss     r ear only    Obesity     Onychomycosis of multiple toenails with type 2 diabetes mellitus ([place] [address] )     last assessed: 3/21/2017         Current Outpatient Medications:     metFORMIN (GLUCOPHAGE) 500 mg tablet, Take 1 tablet (500 mg total) by mouth 2 (two) times a day with meals, Disp: 180 tablet, Rfl: 1    sildenafil (VIAGRA) 100 mg tablet, Take 1 tablet (100 mg total) by mouth daily as needed for erectile dysfunction, Disp: 10 tablet, Rfl: 0    meloxicam (MOBIC) 15 mg tablet, Take 1 tablet (15 mg total) by mouth daily, Disp: 30 tablet, Rfl: 0    Allergies   Allergen Reactions    Menthol Rash       Social History   Past Surgical History:   Procedure Laterality Date    CHOLANGIOGRAM N/A 10/24/2018    Procedure: CHOLANGIOGRAM;  Surgeon: Loco Wheatley MD;  [facility];  Service: General    CHOLECYSTECTOMY      CHOLECYSTECTOMY LAPAROSCOPIC N/A 10/24/2018    Procedure: CHOLECYSTECTOMY LAPAROSCOPIC;  Surgeon: Loco Wheatley MD;  [facility];  Service: General    MOUTH SURGERY  12/28/2017    TONSILLECTOMY AND ADENOIDECTOMY       Family History   Problem Relation Age of Onset    Diabetes Mother     No Known Problems Father     Stomach cancer Paternal Grandfather        Objective:  /88 (BP Location: Left arm, Patient Position: Sitting, Cuff Size: Large)   Pulse 80   Temp 97 6 °F (36 4 °C) (Oral)   Ht 6' (1 829 m)   Wt (!) 196 kg (433 lb) Comment: w shoes  SpO2 95% Comment: ra  BMI 58 73 kg/m²     No results found for this or any previous visit (from the past 1344 hour(s))  Physical Exam   Constitutional: He is oriented to person, place, and time  He appears well-developed and well-nourished  No distress  obese   HENT:   Head: Normocephalic and atraumatic  Right Ear: External ear normal    Left Ear: External ear normal    Nose: Nose normal    Mouth/Throat: Oropharynx is clear and moist  No oropharyngeal exudate  Eyes: Pupils are equal, round, and reactive to light  Conjunctivae and EOM are normal  Right eye exhibits no discharge  Left eye exhibits no discharge  Neck: Normal range of motion  Neck supple  No thyromegaly present  Cardiovascular: Normal rate, regular rhythm, normal heart sounds and intact distal pulses  Exam reveals no gallop and no friction rub  No murmur heard  Pulmonary/Chest: Effort normal and breath sounds normal  No stridor  No respiratory distress  He has no wheezes  He has no rales  Abdominal: Soft  Bowel sounds are normal  He exhibits no distension  There is no tenderness  Musculoskeletal:        Right knee: He exhibits bony tenderness (Medial aspect of right knee over meniscus)  He exhibits normal range of motion, no swelling, no effusion, no deformity, no erythema, no LCL laxity, normal meniscus and no MCL laxity  Tenderness found  Medial joint line tenderness noted  Lymphadenopathy:     He has no cervical adenopathy  Neurological: He is alert and oriented to person, place, and time  Skin: Skin is warm and dry  No rash noted  He is not diaphoretic  No erythema  Psychiatric: He has a normal mood and affect   His behavior is normal  Judgment and thought content normal

## 2020-03-12 NOTE — ASSESSMENT & PLAN NOTE
Lab Results   Component Value Date    HGBA1C 5 9 (H) 07/05/2019   Will get updated blood work, patient currently on metformin 500 mg twice daily for weight loss  Patient is to continue to work on diet and exercise  Limit sugars and carbohydrate intake  Avoid soda, juice, sweets, cookies, desserts, pasta, bread    Eat more whole grains, exercised 30 min of cardio at least 3 times a week  Also recommended daily foot exams to check for sores, and recommended yearly eye exams

## 2020-03-12 NOTE — ASSESSMENT & PLAN NOTE
Will place referral to physical therapy, will get x-ray of right knee, patient is to start Mobic 15 mg tablet once daily, advised patient to take this medication with food and not to take with any other NSAIDs  Patient is to elevate his lower extremity and use ice when possible, and get a compression sleeve for his knee

## 2020-03-13 DIAGNOSIS — M76.899 ENTHESOPATHY OF KNEE: Primary | ICD-10-CM

## 2020-03-13 LAB
ALBUMIN SERPL-MCNC: 3.7 G/DL (ref 4–5)
ALBUMIN/GLOB SERPL: 1.2 {RATIO} (ref 1.2–2.2)
ALP SERPL-CCNC: 99 IU/L (ref 39–117)
ALT SERPL-CCNC: 64 IU/L (ref 0–44)
AST SERPL-CCNC: 34 IU/L (ref 0–40)
BASOPHILS # BLD AUTO: 0 X10E3/UL (ref 0–0.2)
BASOPHILS NFR BLD AUTO: 0 %
BILIRUB SERPL-MCNC: 0.5 MG/DL (ref 0–1.2)
BUN SERPL-MCNC: 10 MG/DL (ref 6–20)
BUN/CREAT SERPL: 13 (ref 9–20)
CALCIUM SERPL-MCNC: 8.9 MG/DL (ref 8.7–10.2)
CHLORIDE SERPL-SCNC: 98 MMOL/L (ref 96–106)
CHOLEST SERPL-MCNC: 156 MG/DL (ref 100–199)
CHOLEST/HDLC SERPL: 4 RATIO (ref 0–5)
CO2 SERPL-SCNC: 33 MMOL/L (ref 20–29)
CREAT SERPL-MCNC: 0.8 MG/DL (ref 0.76–1.27)
EOSINOPHIL # BLD AUTO: 0.3 X10E3/UL (ref 0–0.4)
EOSINOPHIL NFR BLD AUTO: 4 %
ERYTHROCYTE [DISTWIDTH] IN BLOOD BY AUTOMATED COUNT: 14.6 % (ref 11.6–15.4)
EST. AVERAGE GLUCOSE BLD GHB EST-MCNC: 123 MG/DL
GLOBULIN SER-MCNC: 3.1 G/DL (ref 1.5–4.5)
GLUCOSE SERPL-MCNC: 109 MG/DL (ref 65–99)
HBA1C MFR BLD: 5.9 % (ref 4.8–5.6)
HCT VFR BLD AUTO: 49.5 % (ref 37.5–51)
HDLC SERPL-MCNC: 39 MG/DL
HGB BLD-MCNC: 16.3 G/DL (ref 13–17.7)
IMM GRANULOCYTES # BLD: 0 X10E3/UL (ref 0–0.1)
IMM GRANULOCYTES NFR BLD: 0 %
LDLC SERPL CALC-MCNC: 100 MG/DL (ref 0–99)
LYMPHOCYTES # BLD AUTO: 1.3 X10E3/UL (ref 0.7–3.1)
LYMPHOCYTES NFR BLD AUTO: 16 %
MCH RBC QN AUTO: 30.8 PG (ref 26.6–33)
MCHC RBC AUTO-ENTMCNC: 32.9 G/DL (ref 31.5–35.7)
MCV RBC AUTO: 93 FL (ref 79–97)
MONOCYTES # BLD AUTO: 0.5 X10E3/UL (ref 0.1–0.9)
MONOCYTES NFR BLD AUTO: 6 %
NEUTROPHILS # BLD AUTO: 6 X10E3/UL (ref 1.4–7)
NEUTROPHILS NFR BLD AUTO: 74 %
PLATELET # BLD AUTO: 225 X10E3/UL (ref 150–450)
POTASSIUM SERPL-SCNC: 5.1 MMOL/L (ref 3.5–5.2)
PROT SERPL-MCNC: 6.8 G/DL (ref 6–8.5)
RBC # BLD AUTO: 5.3 X10E6/UL (ref 4.14–5.8)
SL AMB EGFR AFRICAN AMERICAN: 131 ML/MIN/1.73
SL AMB EGFR NON AFRICAN AMERICAN: 113 ML/MIN/1.73
SL AMB VLDL CHOLESTEROL CALC: 17 MG/DL (ref 5–40)
SODIUM SERPL-SCNC: 141 MMOL/L (ref 134–144)
TRIGL SERPL-MCNC: 85 MG/DL (ref 0–149)
WBC # BLD AUTO: 8.1 X10E3/UL (ref 3.4–10.8)

## 2020-03-13 PROCEDURE — 3044F HG A1C LEVEL LT 7.0%: CPT | Performed by: NURSE PRACTITIONER

## 2020-03-16 ENCOUNTER — TELEPHONE (OUTPATIENT)
Dept: INTERNAL MEDICINE CLINIC | Facility: CLINIC | Age: 40
End: 2020-03-16

## 2020-03-16 NOTE — TELEPHONE ENCOUNTER
Patient in need of Meloxicam 15mgs sent [facility] [employer], Patient said it was to be done when he was at his tory 03/12/2020 with Mitch Jeffers, [address] [place] [address] at [employer] does not have the script

## 2020-03-16 NOTE — TELEPHONE ENCOUNTER
I called the pharmacy and they have not been able to find the prescription that Epic has as confirmed as receiving on 3/12/20  The pharmacist took the prescription verbally  I read the information from the screen that was sent to   [facility] on 3/12/20

## 2020-03-27 ENCOUNTER — EVALUATION (OUTPATIENT)
Dept: PHYSICAL THERAPY | Facility: REHABILITATION | Age: 40
End: 2020-03-27
Payer: COMMERCIAL

## 2020-03-27 DIAGNOSIS — M25.561 ACUTE PAIN OF RIGHT KNEE: ICD-10-CM

## 2020-03-27 PROCEDURE — 97161 PT EVAL LOW COMPLEX 20 MIN: CPT | Performed by: PHYSICAL THERAPIST

## 2020-03-27 NOTE — PROGRESS NOTES
PT Evaluation     Today's date: 3/27/2020  Patient name: Tristen Cordero  : 1980  MRN: [id number]  Referring provider: Ameena Lion , *  Dx:   Encounter Diagnosis     ICD-10-CM    1  Acute pain of right knee M25 561 Ambulatory referral to Physical Therapy                  Assessment  Assessment details: Pt is a 48 yo male who presents with medial knee pain of insidious onset  He does have a history of a prior medial meniscus injury  He is tender with palpation over the medial joint line and special tests for meniscus tear are all positive  He would benefit from manual therapy and exercises to decrease pain and restore functional mobility  Impairments: abnormal gait and pain with function    Symptom irritability: highBarriers to therapy: Pt is obese  Understanding of Dx/Px/POC: excellent   Prognosis: good    Goals  STG: in 4 weeks  Decrease pain to 5/10 at most  Decrease joint noise  LTG: by discharge  Decrease pain to 2/10 at most  Able to transfer sit to stand without pain  Able to go up and down stairs without pain  Plan  Patient would benefit from: skilled physical therapy  Referral necessary: No  Planned modality interventions: TENS and cryotherapy  Planned therapy interventions: manual therapy, joint mobilization, patient education, strengthening, neuromuscular re-education, stretching and home exercise program  Frequency: 2x week  Duration in weeks: 8  Treatment plan discussed with: patient        Subjective Evaluation    History of Present Illness  Mechanism of injury: Knee pain began the first week of March for no apparent reason  He notes there are good days and bad days      Prior meniscus injury  Pain  Current pain rating: 3  At best pain ratin  At worst pain ratin  Location: medial patella  Quality: grinding  Relieving factors: rest and medications  Aggravating factors: stair climbing and walking          Objective     Tenderness     Right Knee   Tenderness in the denies pain/discomfort medial joint line  Active Range of Motion   Left Knee   Normal active range of motion    Right Knee   Normal active range of motion    Additional Active Range of Motion Details  Tight hamstrings and ITB    Passive Range of Motion   Left Knee   Normal passive range of motion    Right Knee   Normal passive range of motion    Mobility   Patellar Mobility:   Left Knee   WFL: medial, lateral, superior and inferior  Right Knee   WFL: medial, lateral, superior and inferior    Strength/Myotome Testing     Left Hip   Normal muscle strength    Right Hip   Normal muscle strength    Left Knee   Normal strength    Right Knee   Normal strength    Tests     Right Knee   Positive medial Beatriz and Thessaly's test at 20 degrees  Negative Apley's compression, lateral Beatriz, patellar compression, Thessaly's test at 5 degrees, valgus stress test at 0 degrees, valgus stress test at 30 degrees, varus stress test at 0 degrees and varus stress test at 30 degrees  Swelling     Left Knee Girth Measurement (cm)   Joint line: 55 5 cm    Right Knee Girth Measurement (cm)   Joint line: 58 5 cm    General Comments:      Knee Comments  Gait is antalgic with decreased stance time on the right, Decreased knee flexion through swing  Everts during gait              Precautions: DM    Daily Treatment Diary     Assessment 3/27            Eval/Reval             FOTO     **     **   HEP Issued              Manuals             MWM tibial med rotation 2x10            Tape tibial med rotation NT            Distraction mob                          Exercise Diary                           LAQ w/5# w/med rot 2x10            Lunge on step 2x10                                                                                                                                                                                     Modalities

## 2020-04-01 ENCOUNTER — OFFICE VISIT (OUTPATIENT)
Dept: PHYSICAL THERAPY | Facility: REHABILITATION | Age: 40
End: 2020-04-01
Payer: COMMERCIAL

## 2020-04-01 DIAGNOSIS — M25.561 ACUTE PAIN OF RIGHT KNEE: Primary | ICD-10-CM

## 2020-04-01 PROCEDURE — 97110 THERAPEUTIC EXERCISES: CPT | Performed by: PHYSICAL THERAPIST

## 2020-04-01 PROCEDURE — 97140 MANUAL THERAPY 1/> REGIONS: CPT | Performed by: PHYSICAL THERAPIST

## 2020-04-01 PROCEDURE — 97112 NEUROMUSCULAR REEDUCATION: CPT | Performed by: PHYSICAL THERAPIST

## 2020-04-03 ENCOUNTER — OFFICE VISIT (OUTPATIENT)
Dept: PHYSICAL THERAPY | Facility: REHABILITATION | Age: 40
End: 2020-04-03
Payer: COMMERCIAL

## 2020-04-03 DIAGNOSIS — M25.561 ACUTE PAIN OF RIGHT KNEE: Primary | ICD-10-CM

## 2020-04-03 PROCEDURE — 97140 MANUAL THERAPY 1/> REGIONS: CPT | Performed by: PHYSICAL THERAPIST

## 2020-04-03 PROCEDURE — 97110 THERAPEUTIC EXERCISES: CPT | Performed by: PHYSICAL THERAPIST

## 2020-04-03 PROCEDURE — 97112 NEUROMUSCULAR REEDUCATION: CPT | Performed by: PHYSICAL THERAPIST

## 2020-04-08 ENCOUNTER — TELEMEDICINE (OUTPATIENT)
Dept: INTERNAL MEDICINE CLINIC | Facility: CLINIC | Age: 40
End: 2020-04-08
Payer: COMMERCIAL

## 2020-04-08 DIAGNOSIS — E11.9 TYPE 2 DIABETES MELLITUS WITHOUT COMPLICATION, WITHOUT LONG-TERM CURRENT USE OF INSULIN (HCC): Primary | Chronic | ICD-10-CM

## 2020-04-08 DIAGNOSIS — M25.561 ACUTE PAIN OF RIGHT KNEE: ICD-10-CM

## 2020-04-08 DIAGNOSIS — E66.01 MORBID OBESITY (HCC): Chronic | ICD-10-CM

## 2020-04-08 PROCEDURE — 99214 OFFICE O/P EST MOD 30 MIN: CPT | Performed by: NURSE PRACTITIONER

## 2020-04-08 RX ORDER — MELOXICAM 15 MG/1
15 TABLET ORAL DAILY
Qty: 90 TABLET | Refills: 0 | Status: SHIPPED | OUTPATIENT
Start: 2020-04-08 | End: 2020-12-04

## 2020-04-13 ENCOUNTER — OFFICE VISIT (OUTPATIENT)
Dept: PHYSICAL THERAPY | Facility: REHABILITATION | Age: 40
End: 2020-04-13
Payer: COMMERCIAL

## 2020-04-13 DIAGNOSIS — M25.561 ACUTE PAIN OF RIGHT KNEE: Primary | ICD-10-CM

## 2020-04-13 PROCEDURE — 97110 THERAPEUTIC EXERCISES: CPT | Performed by: PHYSICAL THERAPIST

## 2020-04-13 PROCEDURE — 97112 NEUROMUSCULAR REEDUCATION: CPT | Performed by: PHYSICAL THERAPIST

## 2020-04-15 ENCOUNTER — TELEPHONE (OUTPATIENT)
Dept: INTERNAL MEDICINE CLINIC | Facility: CLINIC | Age: 40
End: 2020-04-15

## 2020-04-16 ENCOUNTER — APPOINTMENT (OUTPATIENT)
Dept: PHYSICAL THERAPY | Facility: REHABILITATION | Age: 40
End: 2020-04-16
Payer: COMMERCIAL

## 2020-04-20 ENCOUNTER — OFFICE VISIT (OUTPATIENT)
Dept: PHYSICAL THERAPY | Facility: REHABILITATION | Age: 40
End: 2020-04-20
Payer: COMMERCIAL

## 2020-04-20 DIAGNOSIS — M25.561 ACUTE PAIN OF RIGHT KNEE: Primary | ICD-10-CM

## 2020-04-20 PROCEDURE — 97110 THERAPEUTIC EXERCISES: CPT | Performed by: PHYSICAL THERAPIST

## 2020-04-20 PROCEDURE — 97112 NEUROMUSCULAR REEDUCATION: CPT | Performed by: PHYSICAL THERAPIST

## 2020-04-27 ENCOUNTER — APPOINTMENT (OUTPATIENT)
Dept: PHYSICAL THERAPY | Facility: REHABILITATION | Age: 40
End: 2020-04-27
Payer: COMMERCIAL

## 2020-05-04 ENCOUNTER — OFFICE VISIT (OUTPATIENT)
Dept: PHYSICAL THERAPY | Facility: REHABILITATION | Age: 40
End: 2020-05-04
Payer: COMMERCIAL

## 2020-05-04 DIAGNOSIS — M25.561 ACUTE PAIN OF RIGHT KNEE: Primary | ICD-10-CM

## 2020-05-04 PROCEDURE — 97110 THERAPEUTIC EXERCISES: CPT | Performed by: PHYSICAL THERAPIST

## 2020-05-04 PROCEDURE — 97112 NEUROMUSCULAR REEDUCATION: CPT | Performed by: PHYSICAL THERAPIST

## 2020-05-12 ENCOUNTER — OFFICE VISIT (OUTPATIENT)
Dept: PHYSICAL THERAPY | Facility: REHABILITATION | Age: 40
End: 2020-05-12
Payer: COMMERCIAL

## 2020-05-12 DIAGNOSIS — M25.561 ACUTE PAIN OF RIGHT KNEE: Primary | ICD-10-CM

## 2020-05-12 PROCEDURE — 97110 THERAPEUTIC EXERCISES: CPT | Performed by: PHYSICAL THERAPIST

## 2020-05-12 PROCEDURE — 97112 NEUROMUSCULAR REEDUCATION: CPT | Performed by: PHYSICAL THERAPIST

## 2020-05-21 ENCOUNTER — OFFICE VISIT (OUTPATIENT)
Dept: PHYSICAL THERAPY | Facility: REHABILITATION | Age: 40
End: 2020-05-21
Payer: COMMERCIAL

## 2020-05-21 DIAGNOSIS — M25.561 ACUTE PAIN OF RIGHT KNEE: Primary | ICD-10-CM

## 2020-05-21 PROCEDURE — 97110 THERAPEUTIC EXERCISES: CPT | Performed by: PHYSICAL THERAPIST

## 2020-05-21 PROCEDURE — 97112 NEUROMUSCULAR REEDUCATION: CPT | Performed by: PHYSICAL THERAPIST

## 2020-05-28 ENCOUNTER — OFFICE VISIT (OUTPATIENT)
Dept: PHYSICAL THERAPY | Facility: REHABILITATION | Age: 40
End: 2020-05-28
Payer: COMMERCIAL

## 2020-05-28 DIAGNOSIS — M25.561 ACUTE PAIN OF RIGHT KNEE: Primary | ICD-10-CM

## 2020-05-28 PROCEDURE — 97110 THERAPEUTIC EXERCISES: CPT | Performed by: PHYSICAL THERAPIST

## 2020-05-28 PROCEDURE — 97112 NEUROMUSCULAR REEDUCATION: CPT | Performed by: PHYSICAL THERAPIST

## 2020-07-28 ENCOUNTER — TELEPHONE (OUTPATIENT)
Dept: INTERNAL MEDICINE CLINIC | Facility: CLINIC | Age: 40
End: 2020-07-28

## 2020-12-04 ENCOUNTER — OFFICE VISIT (OUTPATIENT)
Dept: INTERNAL MEDICINE CLINIC | Facility: CLINIC | Age: 40
End: 2020-12-04
Payer: COMMERCIAL

## 2020-12-04 VITALS
SYSTOLIC BLOOD PRESSURE: 140 MMHG | HEART RATE: 88 BPM | TEMPERATURE: 98.7 F | BODY MASS INDEX: 42.66 KG/M2 | DIASTOLIC BLOOD PRESSURE: 80 MMHG | WEIGHT: 315 LBS | HEIGHT: 72 IN | OXYGEN SATURATION: 98 %

## 2020-12-04 DIAGNOSIS — I10 ESSENTIAL HYPERTENSION: ICD-10-CM

## 2020-12-04 DIAGNOSIS — E11.8 TYPE 2 DIABETES MELLITUS WITH COMPLICATION (HCC): ICD-10-CM

## 2020-12-04 DIAGNOSIS — E66.01 MORBID OBESITY (HCC): Chronic | ICD-10-CM

## 2020-12-04 DIAGNOSIS — M25.561 ACUTE PAIN OF RIGHT KNEE: ICD-10-CM

## 2020-12-04 DIAGNOSIS — E11.9 TYPE 2 DIABETES MELLITUS WITHOUT COMPLICATION, WITHOUT LONG-TERM CURRENT USE OF INSULIN (HCC): Primary | Chronic | ICD-10-CM

## 2020-12-04 LAB
CREAT UR-MCNC: 113 MG/DL
MICROALBUMIN UR-MCNC: 6.6 MG/L (ref 0–20)
MICROALBUMIN/CREAT 24H UR: 6 MG/G CREATININE (ref 0–30)

## 2020-12-04 PROCEDURE — 82570 ASSAY OF URINE CREATININE: CPT | Performed by: NURSE PRACTITIONER

## 2020-12-04 PROCEDURE — 3079F DIAST BP 80-89 MM HG: CPT | Performed by: NURSE PRACTITIONER

## 2020-12-04 PROCEDURE — 3077F SYST BP >= 140 MM HG: CPT | Performed by: NURSE PRACTITIONER

## 2020-12-04 PROCEDURE — 3725F SCREEN DEPRESSION PERFORMED: CPT | Performed by: NURSE PRACTITIONER

## 2020-12-04 PROCEDURE — 82043 UR ALBUMIN QUANTITATIVE: CPT | Performed by: NURSE PRACTITIONER

## 2020-12-04 PROCEDURE — 99214 OFFICE O/P EST MOD 30 MIN: CPT | Performed by: NURSE PRACTITIONER

## 2020-12-04 PROCEDURE — 3061F NEG MICROALBUMINURIA REV: CPT | Performed by: NURSE PRACTITIONER

## 2020-12-04 PROCEDURE — 4010F ACE/ARB THERAPY RXD/TAKEN: CPT | Performed by: NURSE PRACTITIONER

## 2020-12-04 PROCEDURE — 1036F TOBACCO NON-USER: CPT | Performed by: NURSE PRACTITIONER

## 2020-12-04 PROCEDURE — 3008F BODY MASS INDEX DOCD: CPT | Performed by: NURSE PRACTITIONER

## 2020-12-04 RX ORDER — MELOXICAM 15 MG/1
15 TABLET ORAL DAILY
Qty: 90 TABLET | Refills: 0 | Status: SHIPPED | OUTPATIENT
Start: 2020-12-04 | End: 2022-04-11

## 2020-12-04 RX ORDER — LOSARTAN POTASSIUM 25 MG/1
25 TABLET ORAL DAILY
Qty: 90 TABLET | Refills: 1 | Status: SHIPPED | OUTPATIENT
Start: 2020-12-04 | End: 2022-04-11 | Stop reason: SDUPTHER

## 2020-12-09 ENCOUNTER — CLINICAL SUPPORT (OUTPATIENT)
Dept: INTERNAL MEDICINE CLINIC | Facility: CLINIC | Age: 40
End: 2020-12-09
Payer: COMMERCIAL

## 2020-12-09 DIAGNOSIS — E11.9 TYPE 2 DIABETES MELLITUS WITHOUT COMPLICATION, WITHOUT LONG-TERM CURRENT USE OF INSULIN (HCC): Primary | Chronic | ICD-10-CM

## 2020-12-09 PROCEDURE — 36415 COLL VENOUS BLD VENIPUNCTURE: CPT

## 2020-12-10 LAB
ALBUMIN SERPL-MCNC: 4 G/DL (ref 4–5)
ALBUMIN/GLOB SERPL: 1.5 {RATIO} (ref 1.2–2.2)
ALP SERPL-CCNC: 91 IU/L (ref 39–117)
ALT SERPL-CCNC: 31 IU/L (ref 0–44)
AST SERPL-CCNC: 22 IU/L (ref 0–40)
BASOPHILS # BLD AUTO: 0 X10E3/UL (ref 0–0.2)
BASOPHILS NFR BLD AUTO: 0 %
BILIRUB SERPL-MCNC: 0.4 MG/DL (ref 0–1.2)
BUN SERPL-MCNC: 13 MG/DL (ref 6–20)
BUN/CREAT SERPL: 16 (ref 9–20)
CALCIUM SERPL-MCNC: 9 MG/DL (ref 8.7–10.2)
CHLORIDE SERPL-SCNC: 100 MMOL/L (ref 96–106)
CHOLEST SERPL-MCNC: 167 MG/DL (ref 100–199)
CHOLEST/HDLC SERPL: 4.2 RATIO (ref 0–5)
CO2 SERPL-SCNC: 31 MMOL/L (ref 20–29)
CREAT SERPL-MCNC: 0.83 MG/DL (ref 0.76–1.27)
EOSINOPHIL # BLD AUTO: 0.4 X10E3/UL (ref 0–0.4)
EOSINOPHIL NFR BLD AUTO: 5 %
ERYTHROCYTE [DISTWIDTH] IN BLOOD BY AUTOMATED COUNT: 12.4 % (ref 11.6–15.4)
EST. AVERAGE GLUCOSE BLD GHB EST-MCNC: 111 MG/DL
GLOBULIN SER-MCNC: 2.6 G/DL (ref 1.5–4.5)
GLUCOSE SERPL-MCNC: 99 MG/DL (ref 65–99)
HBA1C MFR BLD: 5.5 % (ref 4.8–5.6)
HCT VFR BLD AUTO: 45.1 % (ref 37.5–51)
HDLC SERPL-MCNC: 40 MG/DL
HGB BLD-MCNC: 15.2 G/DL (ref 13–17.7)
IMM GRANULOCYTES # BLD: 0 X10E3/UL (ref 0–0.1)
IMM GRANULOCYTES NFR BLD: 0 %
LDLC SERPL CALC-MCNC: 109 MG/DL (ref 0–99)
LYMPHOCYTES # BLD AUTO: 1.8 X10E3/UL (ref 0.7–3.1)
LYMPHOCYTES NFR BLD AUTO: 21 %
MCH RBC QN AUTO: 30.6 PG (ref 26.6–33)
MCHC RBC AUTO-ENTMCNC: 33.7 G/DL (ref 31.5–35.7)
MCV RBC AUTO: 91 FL (ref 79–97)
MONOCYTES # BLD AUTO: 0.6 X10E3/UL (ref 0.1–0.9)
MONOCYTES NFR BLD AUTO: 7 %
NEUTROPHILS # BLD AUTO: 5.6 X10E3/UL (ref 1.4–7)
NEUTROPHILS NFR BLD AUTO: 67 %
PLATELET # BLD AUTO: 245 X10E3/UL (ref 150–450)
POTASSIUM SERPL-SCNC: 4.5 MMOL/L (ref 3.5–5.2)
PROT SERPL-MCNC: 6.6 G/DL (ref 6–8.5)
RBC # BLD AUTO: 4.97 X10E6/UL (ref 4.14–5.8)
SL AMB EGFR AFRICAN AMERICAN: 129 ML/MIN/1.73
SL AMB EGFR NON AFRICAN AMERICAN: 112 ML/MIN/1.73
SL AMB VLDL CHOLESTEROL CALC: 18 MG/DL (ref 5–40)
SODIUM SERPL-SCNC: 140 MMOL/L (ref 134–144)
TRIGL SERPL-MCNC: 99 MG/DL (ref 0–149)
WBC # BLD AUTO: 8.4 X10E3/UL (ref 3.4–10.8)

## 2020-12-10 PROCEDURE — 3044F HG A1C LEVEL LT 7.0%: CPT | Performed by: NURSE PRACTITIONER

## 2021-01-13 ENCOUNTER — TELEPHONE (OUTPATIENT)
Dept: INTERNAL MEDICINE CLINIC | Facility: CLINIC | Age: 41
End: 2021-01-13

## 2021-01-13 NOTE — LETTER
[address]  [facility] [id number]       I am writing to remind you to please call today to schedule an appointment for your Diabetic Eye Exam      Managing your diabetes means being aware of the other health issues you might face because of it  Diabetes -- both type 1 and type 2 -- increases your risk of eye complications over time, and some of those problems could lead to blindness  Whether it's minor eye disorders or something more serious, it's important for patients with diabetes to have regular eye exams and screenings, and to manage their diabetes  Many patients with diabetes don't know that they have the condition   Your eye doctor may see some of the signs of diabetes-related damage during an eye exam  **WebMD        If you need any assistance with scheduling or have question please contact Saul Chiu lead, MA [place] [address]   [phone]

## 2021-01-13 NOTE — TELEPHONE ENCOUNTER
Spoke with patient about the importance on getting the DM eye exam done  Patient will schedule the appt as soon as he can, patient stated  I am mailing patient education and form

## 2021-04-27 NOTE — TELEPHONE ENCOUNTER
Appt made at [facility]  05/06/2021 9:15am  Mailing form to pt & faxed to office

## 2021-05-06 LAB
LEFT EYE DIABETIC RETINOPATHY: NORMAL
RIGHT EYE DIABETIC RETINOPATHY: NORMAL

## 2021-05-17 DIAGNOSIS — N52.8 OTHER MALE ERECTILE DYSFUNCTION: ICD-10-CM

## 2021-05-24 RX ORDER — SILDENAFIL 100 MG/1
TABLET, FILM COATED ORAL
Qty: 6 TABLET | Refills: 1 | OUTPATIENT
Start: 2021-05-24

## 2022-04-11 ENCOUNTER — OFFICE VISIT (OUTPATIENT)
Dept: INTERNAL MEDICINE CLINIC | Facility: OTHER | Age: 42
End: 2022-04-11
Payer: COMMERCIAL

## 2022-04-11 VITALS
WEIGHT: 315 LBS | TEMPERATURE: 98.7 F | BODY MASS INDEX: 42.66 KG/M2 | HEART RATE: 90 BPM | RESPIRATION RATE: 18 BRPM | SYSTOLIC BLOOD PRESSURE: 144 MMHG | OXYGEN SATURATION: 93 % | HEIGHT: 72 IN | DIASTOLIC BLOOD PRESSURE: 88 MMHG

## 2022-04-11 DIAGNOSIS — N52.8 OTHER MALE ERECTILE DYSFUNCTION: ICD-10-CM

## 2022-04-11 DIAGNOSIS — E11.8 TYPE 2 DIABETES MELLITUS WITH COMPLICATION (HCC): ICD-10-CM

## 2022-04-11 DIAGNOSIS — I10 ESSENTIAL HYPERTENSION: ICD-10-CM

## 2022-04-11 DIAGNOSIS — E66.01 MORBID OBESITY (HCC): ICD-10-CM

## 2022-04-11 DIAGNOSIS — E11.9 TYPE 2 DIABETES MELLITUS WITHOUT COMPLICATION, WITHOUT LONG-TERM CURRENT USE OF INSULIN (HCC): Primary | ICD-10-CM

## 2022-04-11 PROBLEM — R29.898 WEAKNESS OF LEFT LOWER EXTREMITY: Status: RESOLVED | Noted: 2017-08-10 | Resolved: 2022-04-11

## 2022-04-11 PROBLEM — Z90.49 STATUS POST CHOLECYSTECTOMY: Status: RESOLVED | Noted: 2018-12-19 | Resolved: 2022-04-11

## 2022-04-11 PROBLEM — D58.2 ELEVATED HEMOGLOBIN (HCC): Status: RESOLVED | Noted: 2017-03-10 | Resolved: 2022-04-11

## 2022-04-11 PROBLEM — M25.561 ACUTE PAIN OF RIGHT KNEE: Status: RESOLVED | Noted: 2020-03-12 | Resolved: 2022-04-11

## 2022-04-11 PROBLEM — Z13.31 POSITIVE DEPRESSION SCREENING: Status: RESOLVED | Noted: 2017-03-21 | Resolved: 2022-04-11

## 2022-04-11 PROBLEM — M77.8 RIGHT WRIST TENDINITIS: Status: RESOLVED | Noted: 2019-03-21 | Resolved: 2022-04-11

## 2022-04-11 LAB — SL AMB POCT HEMOGLOBIN AIC: 6.6 (ref ?–6.5)

## 2022-04-11 PROCEDURE — 3008F BODY MASS INDEX DOCD: CPT | Performed by: INTERNAL MEDICINE

## 2022-04-11 PROCEDURE — 4010F ACE/ARB THERAPY RXD/TAKEN: CPT | Performed by: INTERNAL MEDICINE

## 2022-04-11 PROCEDURE — 3044F HG A1C LEVEL LT 7.0%: CPT | Performed by: INTERNAL MEDICINE

## 2022-04-11 PROCEDURE — 3079F DIAST BP 80-89 MM HG: CPT | Performed by: INTERNAL MEDICINE

## 2022-04-11 PROCEDURE — 3725F SCREEN DEPRESSION PERFORMED: CPT | Performed by: INTERNAL MEDICINE

## 2022-04-11 PROCEDURE — 99214 OFFICE O/P EST MOD 30 MIN: CPT | Performed by: INTERNAL MEDICINE

## 2022-04-11 PROCEDURE — 83036 HEMOGLOBIN GLYCOSYLATED A1C: CPT | Performed by: INTERNAL MEDICINE

## 2022-04-11 PROCEDURE — 3077F SYST BP >= 140 MM HG: CPT | Performed by: INTERNAL MEDICINE

## 2022-04-11 RX ORDER — SILDENAFIL 100 MG/1
100 TABLET, FILM COATED ORAL DAILY PRN
Qty: 10 TABLET | Refills: 3 | Status: SHIPPED | OUTPATIENT
Start: 2022-04-11

## 2022-04-11 RX ORDER — LOSARTAN POTASSIUM 25 MG/1
25 TABLET ORAL DAILY
Qty: 90 TABLET | Refills: 1 | Status: SHIPPED | OUTPATIENT
Start: 2022-04-11

## 2022-04-11 RX ORDER — SILDENAFIL 100 MG/1
TABLET, FILM COATED ORAL
COMMUNITY
Start: 2021-05-17 | End: 2022-04-11 | Stop reason: SDUPTHER

## 2022-04-11 NOTE — PROGRESS NOTES
Assessment/Plan:    Type 2 diabetes mellitus (HCC)    Lab Results   Component Value Date    HGBA1C 6 6 (A) 04/11/2022   Will restart metformin however at 1000 mg twice a day  Recheck A1c in 3 months  Discussed the importance of diet, exercise, weight loss  Essential hypertension  Will restart losartan at 25 mg daily  Morbid obesity ([place] [address] )  Discussed diet, exercise, and weight loss  Will refer to weight management  Other male erectile dysfunction  Will restart sildenafil at 100 mg daily as needed  Diagnoses and all orders for this visit:    Type 2 diabetes mellitus without complication, without long-term current use of insulin ([place] [address] )  -     Ambulatory Referral to Weight Management; Future    Essential hypertension  -     losartan (COZAAR) 25 mg tablet; Take 1 tablet (25 mg total) by mouth daily  -     Ambulatory Referral to Weight Management; Future  -     CBC and differential; Future  -     Hemoglobin A1C; Future  -     Lipid panel; Future  -     Microalbumin / creatinine urine ratio; Future  -     Comprehensive metabolic panel; Future    Type 2 diabetes mellitus with complication ([place] [address] )  -     Ambulatory Referral to Weight Management; Future  -     metFORMIN (GLUCOPHAGE) 1000 MG tablet; Take 1 tablet (1,000 mg total) by mouth 2 (two) times a day with meals  -     CBC and differential; Future  -     Hemoglobin A1C; Future  -     Lipid panel; Future  -     Microalbumin / creatinine urine ratio; Future  -     Comprehensive metabolic panel; Future  -     POCT hemoglobin A1c    Morbid obesity ([place] [address] )  -     Ambulatory Referral to Weight Management; Future    Other male erectile dysfunction  -     sildenafil (VIAGRA) 100 mg tablet;  Take 1 tablet (100 mg total) by mouth daily as needed for erectile dysfunction  -     Testosterone, free, total; Future    Other orders  -     Discontinue: sildenafil (VIAGRA) 100 mg tablet;  (Patient not taking: Reported on 4/11/2022 )                  Subjective:      Patient ID: Fermín Castillo is a [address] y [address]  male  Chief Complaint   Patient presents with    health maintenance     hep c, HIV screening, BMI Adult, BMI f/u plan, A1C, PHQ, foot exam    Penis / Scrotum Problem     for about 1 month       [address]year old male is seen today for follow up of chronic conditions  No labs to review today  POC A1c is 6 6% today  He stopped taking his losartan and metformin for some time  He has been experiencing erectile dysfunction  He has noticed decreased episodes of morning erections and has been experiencing ED with initiating and maintaining an erection  otherwise, he has no active complaints or concerns at this time  Erectile Dysfunction  This is a new problem  The current episode started more than 1 month ago  The problem is unchanged  The nature of his difficulty is achieving erection and maintaining erection  Pertinent negatives include no hematuria  Past treatments include nothing  Diabetes  He presents for his follow-up diabetic visit  He has type 2 diabetes mellitus  His disease course has been worsening  There are no hypoglycemic associated symptoms  Pertinent negatives for hypoglycemia include no dizziness  There are no diabetic associated symptoms  Pertinent negatives for diabetes include no fatigue and no weakness  There are no hypoglycemic complications  Symptoms are stable  There are no diabetic complications  Risk factors for coronary artery disease include diabetes mellitus, male sex, obesity and hypertension  When asked about current treatments, none were reported  Hypertension  This is a chronic problem  The current episode started more than 1 year ago  The problem is unchanged  The problem is uncontrolled  Pertinent negatives include no palpitations  Past treatments include angiotensin blockers  The current treatment provides moderate improvement  Compliance problems include exercise and diet          The following portions of the patient's history were reviewed and updated as appropriate: allergies, current medications, past family history, past medical history, past social history, past surgical history and problem list     Review of Systems   Constitutional: Negative for activity change, appetite change, diaphoresis and fatigue  HENT: Negative for congestion, postnasal drip, rhinorrhea, sinus pressure, sinus pain and sneezing  Eyes: Negative for visual disturbance  Respiratory: Negative for apnea, choking, chest tightness and wheezing  Cardiovascular: Negative for palpitations and leg swelling  Gastrointestinal: Negative for abdominal distention, anal bleeding and blood in stool  Endocrine: Negative for cold intolerance and heat intolerance  Genitourinary: Negative for difficulty urinating and hematuria  Musculoskeletal: Negative  Skin: Negative  Neurological: Negative for dizziness, weakness, light-headedness and numbness  Hematological: Negative for adenopathy  Psychiatric/Behavioral: Negative for agitation, sleep disturbance and suicidal ideas  All other systems reviewed and are negative          Past Medical History:   Diagnosis Date    [facility] mellitus ([facility] )     Diabetes mellitus ([facility] )     other type with circulatory complication, without long-term current    Essential hypertension 12/19/2018    Hearing loss     r ear only    Obesity     Onychomycosis of multiple toenails with type 2 diabetes mellitus ([facility] )     last assessed: 3/21/2017    Status post cholecystectomy 12/19/2018         Current Outpatient Medications:     losartan (COZAAR) 25 mg tablet, Take 1 tablet (25 mg total) by mouth daily, Disp: 90 tablet, Rfl: 1    metFORMIN (GLUCOPHAGE) 1000 MG tablet, Take 1 tablet (1,000 mg total) by mouth 2 (two) times a day with meals, Disp: 180 tablet, Rfl: 1    sildenafil (VIAGRA) 100 mg tablet, Take 1 tablet (100 mg total) by mouth daily as needed for erectile dysfunction, Disp: 10 tablet, Rfl: 3    Allergies   Allergen Reactions    Menthol Rash       Social History   Past Surgical History:   Procedure Laterality Date    CHOLANGIOGRAM N/A 10/24/2018    Procedure: CHOLANGIOGRAM;  Surgeon: Rhina Galan MD;  [facility];  Service: General    CHOLECYSTECTOMY      CHOLECYSTECTOMY LAPAROSCOPIC N/A 10/24/2018    Procedure: Corrie Law;  Surgeon: Rhina Galan MD;  [facility];  Service: General    MOUTH SURGERY  12/28/2017    TONSILLECTOMY AND ADENOIDECTOMY       Family History   Problem Relation Age of Onset    Diabetes Mother     No Known Problems Father     Stomach cancer Paternal Grandfather        Objective:  /88 (BP Location: Left arm, Patient Position: Sitting, Cuff Size: Large)   Pulse 90   Temp 98 7 °F (37 1 °C) (Temporal)   Resp 18   Ht 6' (1 829 m)   Wt (!) 209 kg (461 lb 12 8 oz)   SpO2 93%   BMI 62 63 kg/m²     Recent Results (from the past 1344 hour(s))   POCT hemoglobin A1c    Collection Time: 04/11/22  1:58 PM   Result Value Ref Range    Hemoglobin A1C 6 6 (A) 6 5            Physical Exam  Vitals and nursing note reviewed  Constitutional:       General: He is not in acute distress  Appearance: He is well-developed  He is not diaphoretic  HENT:      Head: Normocephalic and atraumatic  Eyes:      General: No scleral icterus  Right eye: No discharge  Left eye: No discharge  Conjunctiva/sclera: Conjunctivae normal       Pupils: Pupils are equal, round, and reactive to light  Neck:      Thyroid: No thyromegaly  Vascular: No JVD  Cardiovascular:      Rate and Rhythm: Normal rate and regular rhythm  Heart sounds: Normal heart sounds  No murmur heard  No friction rub  No gallop  Pulmonary:      Effort: Pulmonary effort is normal  No respiratory distress  Breath sounds: Normal breath sounds  No wheezing or rales  Chest:      Chest wall: No tenderness     Abdominal:      General: Bowel sounds are normal  There is no distension  Palpations: Abdomen is soft  There is no mass  Tenderness: There is no abdominal tenderness  There is no guarding or rebound  Musculoskeletal:         General: No tenderness or deformity  Normal range of motion  Cervical back: Normal range of motion and neck supple  Lymphadenopathy:      Cervical: No cervical adenopathy  Skin:     General: Skin is warm and dry  Coloration: Skin is not pale  Findings: No erythema or rash  Neurological:      Mental Status: He is alert and oriented to person, place, and time  Cranial Nerves: No cranial nerve deficit  Coordination: Coordination normal       Deep Tendon Reflexes: Reflexes are normal and symmetric  Psychiatric:         Behavior: Behavior normal          Thought Content:  Thought content normal          Judgment: Judgment normal

## 2022-04-11 NOTE — ASSESSMENT & PLAN NOTE
Lab Results   Component Value Date    HGBA1C 6 6 (A) 04/11/2022   Will restart metformin however at 1000 mg twice a day  Recheck A1c in 3 months  Discussed the importance of diet, exercise, weight loss

## 2022-04-11 NOTE — PROGRESS NOTES
Diabetic Foot Exam    Patient's shoes and socks removed  Right Foot/Ankle   Right Foot Inspection  Skin Exam: skin normal, skin intact, dry skin, callus and callus  No warmth, no erythema, no maceration, no abnormal color, no pre-ulcer and no ulcer  Toe Exam: ROM and strength within normal limits  Sensory   Monofilament testing: intact    Vascular  Capillary refills: < 3 seconds  The right DP pulse is 2+  The right PT pulse is 2+  Left Foot/Ankle  Left Foot Inspection  Skin Exam: skin normal, skin intact, dry skin and callus  No warmth, no erythema, no maceration, normal color, no pre-ulcer and no ulcer  Toe Exam: ROM and strength within normal limits  Sensory   Monofilament testing: intact    Vascular  Capillary refills: < 3 seconds  The left DP pulse is 2+  The left PT pulse is 2+       Assign Risk Category  No deformity present  No loss of protective sensation  No weak pulses  Risk: 0

## 2022-05-01 NOTE — ASSESSMENT & PLAN NOTE
Gave ACP information to pt as requested. PT didn't want to complete form at this time.   He is to start an exercise, diet, and weight loss plan starting the new year

## 2022-06-28 ENCOUNTER — TELEPHONE (OUTPATIENT)
Dept: BARIATRICS | Facility: CLINIC | Age: 42
End: 2022-06-28

## 2022-07-27 NOTE — ASSESSMENT & PLAN NOTE
Discussed lifestyle modifications with diet, exercise and weight loss  He is to schedule an appointment with weight management  Postpartum Note, Vaginal delivery  Postpartum Day 1    Subjective:  The patient feels well.  She is ambulating.   She is tolerating regular diet.  She denies nausea and vomiting.  She is voiding.  Her pain is controlled.  She reports normal postpartum bleeding.    Physical exam:    Vital Signs Last 24 Hrs  T(C): 36.7 (27 Jul 2022 09:55), Max: 37.4 (27 Jul 2022 00:36)  T(F): 98 (27 Jul 2022 09:55), Max: 99.32 (27 Jul 2022 00:36)  HR: 93 (27 Jul 2022 09:55) (70 - 149)  BP: 117/57 (27 Jul 2022 09:55) (101/54 - 137/64)  BP(mean): --  RR: 19 (27 Jul 2022 09:55) (16 - 19)  SpO2: 97% (27 Jul 2022 09:55) (96% - 100%)    Parameters below as of 27 Jul 2022 09:55  Patient On (Oxygen Delivery Method): room air        Gen: NAD    Abdomen: Soft, nontender, no distension , firm uterine fundus at umbilicus.  Pelvic: Normal lochia noted  Ext: No calf tenderness    LABS:                        13.1   9.72  )-----------( 128      ( 26 Jul 2022 15:35 )             41.6     ABO Interpretation: O (07-26 @ 15:40)  Rh Interpretation: Positive (07-26 @ 15:40)  Antibody Screen: Negative (07-26 @ 15:40)  ABO Interpretation: O (07-26 @ 15:40)  Rh Interpretation: Positive (07-26 @ 15:40)    Rubella status:     Allergies    No Known Allergies    Intolerances      MEDICATIONS  (STANDING):  acetaminophen     Tablet .. 975 milliGRAM(s) Oral <User Schedule>  diphtheria/tetanus/pertussis (acellular) Vaccine (ADAcel) 0.5 milliLiter(s) IntraMuscular once  ibuprofen  Tablet. 600 milliGRAM(s) Oral every 6 hours  ondansetron Injectable 4 milliGRAM(s) IV Push once  prenatal multivitamin 1 Tablet(s) Oral daily  sodium chloride 0.9% lock flush 3 milliLiter(s) IV Push every 8 hours    MEDICATIONS  (PRN):  benzocaine 20%/menthol 0.5% Spray 1 Spray(s) Topical every 6 hours PRN for Perineal discomfort  dibucaine 1% Ointment 1 Application(s) Topical every 6 hours PRN Perineal discomfort  diphenhydrAMINE 25 milliGRAM(s) Oral every 6 hours PRN Pruritus  hydrocortisone 1% Cream 1 Application(s) Topical every 6 hours PRN Moderate Pain (4-6)  lanolin Ointment 1 Application(s) Topical every 6 hours PRN nipple soreness  magnesium hydroxide Suspension 30 milliLiter(s) Oral two times a day PRN Constipation  oxyCODONE    IR 5 milliGRAM(s) Oral every 3 hours PRN Moderate to Severe Pain (4-10)  oxyCODONE    IR 5 milliGRAM(s) Oral once PRN Moderate to Severe Pain (4-10)  pramoxine 1%/zinc 5% Cream 1 Application(s) Topical every 4 hours PRN Moderate Pain (4-6)  simethicone 80 milliGRAM(s) Chew every 4 hours PRN Gas  witch hazel Pads 1 Application(s) Topical every 4 hours PRN Perineal discomfort        Assessment and Plan  PPD 1 S/p vaginal delivery  Doing well, tolerates diet, flatus.  Encourage ambulation and regular diet  Saline lock IV  Continue current [pain meds as needed  DC home today or tomorrow according to criteria.  Instructions reviewed and questiuons answered  Follow up 6 weeks  Travis Cornelius MD

## 2022-10-22 ENCOUNTER — OFFICE VISIT (OUTPATIENT)
Dept: URGENT CARE | Facility: MEDICAL CENTER | Age: 42
End: 2022-10-22
Payer: COMMERCIAL

## 2022-10-22 VITALS
WEIGHT: 315 LBS | DIASTOLIC BLOOD PRESSURE: 90 MMHG | HEART RATE: 86 BPM | SYSTOLIC BLOOD PRESSURE: 177 MMHG | OXYGEN SATURATION: 92 % | BODY MASS INDEX: 41.75 KG/M2 | TEMPERATURE: 98.1 F | RESPIRATION RATE: 24 BRPM | HEIGHT: 73 IN

## 2022-10-22 DIAGNOSIS — M46.1 BILATERAL SACROILIITIS (HCC): Primary | ICD-10-CM

## 2022-10-22 PROCEDURE — 99213 OFFICE O/P EST LOW 20 MIN: CPT | Performed by: FAMILY MEDICINE

## 2022-10-22 RX ORDER — NAPROXEN 500 MG/1
500 TABLET ORAL 2 TIMES DAILY WITH MEALS
Qty: 60 TABLET | Refills: 0 | Status: SHIPPED | OUTPATIENT
Start: 2022-10-22

## 2022-10-22 NOTE — PROGRESS NOTES
[facility]    NAME: Mandy Miles is a 38 y o  male  : 1980    MRN: [id number]  DATE: 2022  TIME: 1:39 PM    Assessment and Plan   Bilateral sacroiliitis ([address] ) [M46 1]  1  Bilateral sacroiliitis (HCC)  naproxen (Naprosyn) 500 mg tablet    Ambulatory Referral to Physical Therapy     Naproxen twice daily as needed with food  To continue with a heating pack  Continue follow-up with chiropractor   Physical therapy referral placed  Provided the patient with warning signs of worsening of her back pain at which time to report immediately to our office or to the emergency    Patient Instructions   There are no Patient Instructions on file for this visit  Follow up with PCP in 3-5 days  Proceed to ER if symptoms worsen  Chief Complaint     Chief Complaint   Patient presents with   • Back Pain     Patient states last  when he got out of bed he felt something move in his back  Tuesday he had sharp pain in his back  HE saw his chiropractor told him his SI joint was pinched  He states his back and buttocks are inflamed and was sen there for steroids  History of Present Illness   51-year-old male who presented for evaluation of back pain  Patient was recently diagnosed with bilateral sacroiliitis  He is requiring level possibility of starting Medrol Dosepak  Patient noted that he had tried meloxicam with some improvement  He denied any fever, loss of bowel or bladder control  He denies symptoms of sciatica  Review of Systems   Review of Systems   Musculoskeletal: Positive for back pain  All other systems reviewed and are negative  The following portions of the patient's history were reviewed and updated as appropriate: allergies, current medications, past family history, past medical history, past social history, past surgical history and problem list      Medications have been verified    Objective   BP (!) 177/90   Pulse 86   Temp 98 1 °F (36 7 °C)   Resp (!) 24  6' 1" (1 854 m)   Wt (!) 200 kg (440 lb)   SpO2 92%   BMI 58 05 kg/m²     Physical Exam  Vitals reviewed  Constitutional:       General: He is not in acute distress  Appearance: He is well-developed  He is not diaphoretic  HENT:      Head: Normocephalic and atraumatic  Nose: Nose normal    Eyes:      Conjunctiva/sclera: Conjunctivae normal       Pupils: Pupils are equal, round, and reactive to light  Cardiovascular:      Rate and Rhythm: Normal rate  Pulmonary:      Effort: Pulmonary effort is normal  No respiratory distress  Abdominal:      General: There is no distension  Palpations: Abdomen is soft  Musculoskeletal:         General: Normal range of motion  Cervical back: Normal range of motion and neck supple  Comments: BACK EXAM:  Gait: normal, no trendelenberg gait, no antalgic gait    BACK TENDERNESS:  Spinous Processes: no  Paraspinal Muscles: no  SI Joint:  Yes bilaterally  Sacrum: no   Skin:     General: Skin is warm and dry  Findings: No erythema or rash  Neurological:      Mental Status: He is alert and oriented to person, place, and time         Dilcia Avery

## 2023-02-01 ENCOUNTER — TELEPHONE (OUTPATIENT)
Dept: INTERNAL MEDICINE CLINIC | Facility: OTHER | Age: 43
End: 2023-02-01

## 2023-02-01 NOTE — TELEPHONE ENCOUNTER
----- Message from [facility] sent at 12/23/2022  1:53 PM EST -----  Pt due for a follow up

## 2023-02-07 ENCOUNTER — TELEMEDICINE (OUTPATIENT)
Dept: INTERNAL MEDICINE CLINIC | Facility: OTHER | Age: 43
End: 2023-02-07

## 2023-02-07 ENCOUNTER — TELEPHONE (OUTPATIENT)
Dept: INTERNAL MEDICINE CLINIC | Facility: OTHER | Age: 43
End: 2023-02-07

## 2023-02-07 VITALS — WEIGHT: 315 LBS | BODY MASS INDEX: 41.75 KG/M2 | HEIGHT: 73 IN

## 2023-02-07 DIAGNOSIS — B34.9 VIRAL INFECTION, UNSPECIFIED: Primary | ICD-10-CM

## 2023-02-07 NOTE — TELEPHONE ENCOUNTER
Patient called in stating that he was told by Dr Deshawn Oseguera to take a covid test and let him know the results  Pt stated the test came out negative for covid  Please give pt a call to follow up

## 2023-02-07 NOTE — LETTER
February 7, 2023     Patient: Venu Delgado  YOB: 1980  Date of Visit: 2/7/2023      To Whom it May Concern:    Bhupendra Conn is under my professional care  Ayonicki Martinezjanneth was seen in my office on 2/7/2023  Delphine Paulino may return to work on 2/11/2023  Please excuse him from work missed on 2/6 and 2/7/2023  If you have any questions or concerns, please don't hesitate to call           Sincerely,          Fifi Cruz MD

## 2023-02-07 NOTE — ASSESSMENT & PLAN NOTE
Discussed continuing over-the-counter medications for symptom relief  He will test himself for COVID via home test today  He will contact me with results  He is interested in receiving antiviral treatment for COVID if positive

## 2023-02-07 NOTE — PROGRESS NOTES
COVID-19 Outpatient Progress Note    Assessment/Plan:    Problem List Items Addressed This Visit        Other    Viral infection, unspecified - Primary     Discussed continuing over-the-counter medications for symptom relief  He will test himself for COVID via home test today  He will contact me with results  He is interested in receiving antiviral treatment for COVID if positive  Disposition:     Recommended patient to come to the office to test for COVID-19/Influenza  Discussed symptom directed medication options with patient  Discussed vitamin D, vitamin C, and/or zinc supplementation with patient  I have spent 15 minutes directly with the patient  Greater than 50% of this time was spent in counseling/coordination of care regarding: prognosis, risks and benefits of treatment options, instructions for management, patient and family education, importance of treatment compliance, risk factor reductions and impressions  Encounter provider: Wen Amos MD     Provider located at: [address] [place] [id number]     Recent Visits  Date Type Provider Dept   02/01/23 Telephone Mariam Valente MA  [facility]   Showing recent visits within past 7 days and meeting all other requirements  Today's Visits  Date Type Provider Dept   02/07/23 Telemedicine Wen Amos MD  [facility]   Showing today's visits and meeting all other requirements  Future Appointments  No visits were found meeting these conditions  Showing future appointments within next 150 days and meeting all other requirements     This virtual check-in was done via  [address] and patient was informed that this is a secure, HIPAA-compliant platform  He agrees to proceed      Patient agrees to participate in a virtual check in via telephone or video visit instead of presenting to the office to address urgent/immediate medical needs  Patient is aware this is a billable service  He acknowledged consent and understanding of privacy and security of the video platform  The patient has agreed to participate and understands they can discontinue the visit at any time  After connecting through [facility], the patient was identified by name and date of birth  Dominique Polanco was informed that this was a telemedicine visit and that the exam was being conducted confidentially over secure lines  My office door was closed  No one else was in the room  Dominique Polanco acknowledged consent and understanding of privacy and security of the telemedicine visit  I informed the patient that I have reviewed his record in Epic and presented the opportunity for him to ask any questions regarding the visit today  The patient agreed to participate  Verification of patient location:  Patient is located in the following state in which I hold an active license: [place]    Subjective:   Dominique Polanco is a 38 y o  male who is concerned about COVID-19  Patient's symptoms include fever, malaise, nasal congestion, rhinorrhea, cough and myalgias  Patient denies chills, fatigue, sore throat, shortness of breath, chest tightness, abdominal pain, nausea, vomiting, diarrhea and headaches       - Date of symptom onset: 2/5/2023      COVID-19 vaccination status: Fully vaccinated (primary series)    Exposure:   Contact with a person who is under investigation (PUI) for or who is positive for COVID-19 within the last 14 days?: No    Hospitalized recently for fever and/or lower respiratory symptoms?: No      Currently a healthcare worker that is involved in direct patient care?: No      Works in a special setting where the risk of COVID-19 transmission may be high? (this may include long-term care, correctional and long-term facilities; homeless shelters; assisted-living facilities and group homes ): No      Resident in a special setting where the risk of COVID-19 transmission may be high? (this may include long-term care, correctional and longterm facilities; homeless shelters; assisted-living facilities and group homes ): No      No results found for: SARSCOV[employer], [address] [facility], [address] [facility], [facility], [facility], [address] [facility]    Review of Systems   Constitutional: Positive for fever  Negative for activity change, appetite change, chills, diaphoresis and fatigue  HENT: Positive for congestion and rhinorrhea  Negative for postnasal drip, sinus pressure, sinus pain, sneezing and sore throat  Eyes: Negative for visual disturbance  Respiratory: Positive for cough  Negative for apnea, choking, chest tightness, shortness of breath and wheezing  Cardiovascular: Negative for chest pain, palpitations and leg swelling  Gastrointestinal: Negative for abdominal distention, abdominal pain, anal bleeding, blood in stool, constipation, diarrhea, nausea and vomiting  Endocrine: Negative for cold intolerance and heat intolerance  Genitourinary: Negative for difficulty urinating, dysuria and hematuria  Musculoskeletal: Positive for myalgias  Skin: Negative  Neurological: Negative for dizziness, weakness, light-headedness, numbness and headaches  Hematological: Negative for adenopathy  Psychiatric/Behavioral: Negative for agitation, sleep disturbance and suicidal ideas  All other systems reviewed and are negative      Current Outpatient Medications on File Prior to Visit   Medication Sig   • losartan (COZAAR) 25 mg tablet Take 1 tablet (25 mg total) by mouth daily (Patient not taking: Reported on 2/7/2023)   • metFORMIN (GLUCOPHAGE) 1000 MG tablet Take 1 tablet (1,000 mg total) by mouth 2 (two) times a day with meals (Patient not taking: Reported on 2/7/2023)   • naproxen (Naprosyn) 500 mg tablet Take 1 tablet (500 mg total) by mouth 2 (two) times a day with meals (Patient not taking: Reported on 2/7/2023)   • sildenafil (VIAGRA) 100 mg tablet Take 1 tablet (100 mg total) by mouth daily as needed for erectile dysfunction (Patient not taking: Reported on 10/22/2022)       Objective:    Ht 6' 1" (1 854 m)   Wt (!) 200 kg (440 lb)   BMI 58 05 kg/m²      Physical Exam  Vitals and nursing note reviewed  Constitutional:       General: He is not in acute distress  Appearance: Normal appearance  He is ill-appearing  HENT:      Head: Normocephalic and atraumatic  Nose: Nose normal       Mouth/Throat:      Mouth: Mucous membranes are moist       Pharynx: No oropharyngeal exudate or posterior oropharyngeal erythema  Eyes:      General: No scleral icterus  Extraocular Movements: Extraocular movements intact  Conjunctiva/sclera: Conjunctivae normal       Pupils: Pupils are equal, round, and reactive to light  Pulmonary:      Effort: Pulmonary effort is normal  No respiratory distress  Breath sounds: No wheezing  Abdominal:      General: Abdomen is flat  There is no distension  Tenderness: There is no abdominal tenderness  Musculoskeletal:         General: No swelling, deformity or signs of injury  Cervical back: Normal range of motion  Skin:     General: Skin is warm and dry  Coloration: Skin is not jaundiced or pale  Findings: No bruising, erythema or rash  Neurological:      General: No focal deficit present  Mental Status: He is alert and oriented to person, place, and time  Mental status is at baseline  Cranial Nerves: No cranial nerve deficit  Psychiatric:         Mood and Affect: Mood normal          Behavior: Behavior normal          Thought Content:  Thought content normal          Judgment: Judgment normal        Abhinav Caldera MD

## 2023-02-09 ENCOUNTER — TELEPHONE (OUTPATIENT)
Dept: INTERNAL MEDICINE CLINIC | Facility: OTHER | Age: 43
End: 2023-02-09

## 2023-02-09 RX ORDER — BENZONATATE 100 MG/1
100 CAPSULE ORAL 3 TIMES DAILY PRN
Qty: 20 CAPSULE | Refills: 0 | Status: SHIPPED | OUTPATIENT
Start: 2023-02-09

## 2023-02-09 RX ORDER — AZITHROMYCIN 250 MG/1
TABLET, FILM COATED ORAL
Qty: 6 TABLET | Refills: 0 | Status: SHIPPED | OUTPATIENT
Start: 2023-02-09 | End: 2023-02-13

## 2023-02-09 NOTE — TELEPHONE ENCOUNTER
Symptoms not resolving coughing is continuing to become worse  Not able to sleep and able to lay back due to cough, fatigue, and some dizzy spell with rapid movement COVID Test was Neg  Productive cough with lots of mucus  Has tried Museux, dayquil and Nyquil and Aleave

## 2023-02-09 NOTE — TELEPHONE ENCOUNTER
Pt had a virtual on 2/7 states he is taking otc meds and they are not helping  He is coughing terribly and it is very productive  No Fever  He is asking for somethng stronger to help him,states he feels awful  He would like to be c/b at [phone]    Goes to [facility]

## 2023-02-09 NOTE — TELEPHONE ENCOUNTER
Symptoms likely viral   I would like for him to take over-the-counter medications for symptom relief  Otherwise, I would like for him to contact our office if his symptoms worsen

## 2023-02-10 ENCOUNTER — TELEPHONE (OUTPATIENT)
Dept: INTERNAL MEDICINE CLINIC | Facility: OTHER | Age: 43
End: 2023-02-10

## 2023-02-10 NOTE — TELEPHONE ENCOUNTER
Rachel jarrett texted Dr Christine Alamo who gave permission for the note to be written  Note completed and patient notified

## 2023-02-10 NOTE — TELEPHONE ENCOUNTER
Patient called and stated he is starting to feel better after starting abx from Dr Imer Barrett  He stated he has to work starting tomorrow and feels he is to fatigued to stand for his entire work shift, he is part of security at a [employer]  Can we creat a note for him to state he can sit when he is on his work shift due to fatigue       Thank you

## 2023-02-13 ENCOUNTER — TELEPHONE (OUTPATIENT)
Dept: INTERNAL MEDICINE CLINIC | Facility: OTHER | Age: 43
End: 2023-02-13

## 2023-02-16 ENCOUNTER — OFFICE VISIT (OUTPATIENT)
Dept: INTERNAL MEDICINE CLINIC | Facility: OTHER | Age: 43
End: 2023-02-16

## 2023-02-16 VITALS
RESPIRATION RATE: 20 BRPM | TEMPERATURE: 98.8 F | DIASTOLIC BLOOD PRESSURE: 84 MMHG | HEIGHT: 73 IN | BODY MASS INDEX: 41.75 KG/M2 | HEART RATE: 82 BPM | SYSTOLIC BLOOD PRESSURE: 140 MMHG | WEIGHT: 315 LBS | OXYGEN SATURATION: 91 %

## 2023-02-16 DIAGNOSIS — R60.0 FLUID RETENTION IN LEGS: ICD-10-CM

## 2023-02-16 DIAGNOSIS — I51.7 MILD CARDIOMEGALY: Primary | ICD-10-CM

## 2023-02-16 DIAGNOSIS — E11.8 TYPE 2 DIABETES MELLITUS WITH COMPLICATION (HCC): ICD-10-CM

## 2023-02-16 DIAGNOSIS — R79.81 LOW OXYGEN SATURATION: ICD-10-CM

## 2023-02-16 DIAGNOSIS — E11.9 TYPE 2 DIABETES MELLITUS WITHOUT COMPLICATION, WITHOUT LONG-TERM CURRENT USE OF INSULIN (HCC): Chronic | ICD-10-CM

## 2023-02-16 DIAGNOSIS — I10 ESSENTIAL HYPERTENSION: ICD-10-CM

## 2023-02-16 RX ORDER — FUROSEMIDE 20 MG/1
TABLET ORAL
COMMUNITY
Start: 2023-02-12 | End: 2023-02-16 | Stop reason: SDUPTHER

## 2023-02-16 RX ORDER — FUROSEMIDE 20 MG/1
20 TABLET ORAL DAILY
Qty: 7 TABLET | Refills: 0 | Status: SHIPPED | OUTPATIENT
Start: 2023-02-16 | End: 2023-02-23

## 2023-02-16 RX ORDER — OSELTAMIVIR PHOSPHATE 75 MG/1
CAPSULE ORAL
COMMUNITY
Start: 2023-02-12

## 2023-02-16 RX ORDER — GUAIFENESIN 600 MG/1
600 TABLET, EXTENDED RELEASE ORAL 2 TIMES DAILY
COMMUNITY
Start: 2023-02-12 | End: 2024-02-12

## 2023-02-16 RX ORDER — ALBUTEROL SULFATE 90 UG/1
AEROSOL, METERED RESPIRATORY (INHALATION)
COMMUNITY
Start: 2023-02-12

## 2023-02-16 NOTE — ASSESSMENT & PLAN NOTE
-Patient reports chronic low oxygen saturation levels  Currently 91% on RA at today's office visit    -patient denies being short of breath or difficulty with breathing  -will send patient for Pulmonary function testing

## 2023-02-16 NOTE — PROGRESS NOTES
Assessment/Plan:    Essential hypertension  -Patient currently not on any medication at this time    -Patient encouraged to monitor BP at home if able  -Will hold off on starting any medication and will reevaluate/discuss at next office visit    Type 2 diabetes mellitus with complication ([facility] )    Lab Results   Component Value Date    HGBA1C 7 0 (H) 02/12/2023     -Patient to continue on metformin 1000 mg twice daily   - Discussed the importance of better compliance with medication regimen  - Discussed diet, exercise and weight loss  -Patient to schedule DM eye exam- referral given to opthalamology   -will check microalbumin/creatinine ratio       Mild cardiomegaly  -Will send for echo  - discussed heart healthy, low sodium diet    Fluid retention in legs  -Patient to continue with 20 mg Furosemide daily for one more week   -Will check a BMP in one week to monitor potassium level  Current Potassium stable at 3 8  -Encouraged patient to eat a banana a day or to increase foods with potassium while on diuretic     Low oxygen saturation  -Patient reports chronic low oxygen saturation levels  Currently 91% on RA at today's office visit  -patient denies being short of breath or difficulty with breathing  -will send patient for Pulmonary function testing            Diagnoses and all orders for this visit:    Mild cardiomegaly  -     Echo complete w/ contrast if indicated; Future    Fluid retention in legs  -     Basic metabolic panel; Future  -     furosemide (LASIX) 20 mg tablet; Take 1 tablet (20 mg total) by mouth daily for 7 days    Low oxygen saturation  -     Complete PFT with post bronchodilator; Future    Type 2 diabetes mellitus without complication, without long-term current use of insulin ([facility] )  -     Ambulatory Referral to Ophthalmology; Future  -     Microalbumin / creatinine urine ratio  -     metFORMIN (GLUCOPHAGE) 1000 MG tablet;  Take 1 tablet (1,000 mg total) by mouth 2 (two) times a day with meals    Essential hypertension    Type 2 diabetes mellitus with complication (HCC)    Other orders  -     albuterol (PROVENTIL HFA,VENTOLIN HFA) 90 mcg/act inhaler; INHALE 2 PUFFS EVERY 6 HOURS AS NEEDED FOR WHEEZING  -     Discontinue: furosemide (LASIX) 20 mg tablet; TAKE 1 TABLET BY MOUTH DAILY FOR 7 DAYS  -     oseltamivir (TAMIFLU) 75 mg capsule; TAKE 1 CAPSULE (75 MG TOTAL) BY MOUTH 2 (TWO) TIMES A DAY FOR 8 DOSES  -     guaiFENesin (MUCINEX) 600 mg 12 hr tablet; Take 600 mg by mouth 2 (two) times a day          Subjective:      Patient ID: Mary Alice Mcgowan is a 38 y o  male  HPI     Chief Complaint   Patient presents with   • Follow-up     2/10 [facility] bronchopneumonia    Still has a cough and bringing up mucus   • hm     Hep c, hiv, annual exam, bmi f/u plan, urine micr reordered, phq, eye exam due after 5/6/23, foot exam due after 4/11/23       39 yo male presents to the office today for an ER follow-up  Patient was seen at [address] [place] [address] on 2/11 for increased lower extremity edema, shortness of breath and ongoing cough and congestion  Patient did test positive for Influenza A and was given a course of Tamiflu  Per [facility] ED note "In the ED patient was noted to be hypoxic (84% on room air)  VBG showed pattern consistent with chronic CO2 retention  Chest x-ray was suggestive of CHF versus pneumonia  Given the additional lower extremity swelling, a CT PE study was performed which was negative for pulmonary embolism, however it did note mild cardiomegaly as well as multifocal bronchopneumonia (felt to be secondary to his influenza A infection)  " Patient did leave [place] but was given furosemide 20 mg, 600 mg guaifenesin, 75 mg tamiflu and albuterol inhaler upon leaving  Patient states he feels significantly better today  Patient currently  91% on RA however he reports that this is his "normal" and he always has had low oxygen levels  He denies any shortness of breath or difficulty with breathing    Patient denies every being a smoker but reports he works at the [employer] so he is exposed to second-hand smoke  Patient continues on the medication regimen of tamiflu, furosemide, mucinex and albuterol and is tolerating well  Patient reports he has noticed a decrease in the amount of fluid in his B/L lower extremities however there is still edema present  HTN- patient currently not taking any medication at this time  States he stopped his Losartan 25 mg a while ago as it was causing difficulty with erectile dysfunction  Current BP today in the office is 140/84  Patient does not check his BP at home  T2DM- Patient currently taking 1000 mg Metformin twice daily  Patient reports he is not consistent with taking his metformin as he misses several doses and takes it when he can remember  Last hgbA1C 7 0 on 2/12/2023  The following portions of the patient's history were reviewed and updated as appropriate: allergies, current medications, past family history, past medical history, past social history, past surgical history and problem list     Review of Systems   Constitutional: Negative for chills, fatigue and fever  HENT: Positive for congestion (patient still feels slight congestion )  Negative for postnasal drip, rhinorrhea and sneezing  Respiratory: Positive for cough (occassional)  Negative for shortness of breath and wheezing  Cardiovascular: Positive for leg swelling ( b/l swelling )  Negative for chest pain and palpitations  Gastrointestinal: Negative for abdominal pain, diarrhea, nausea and vomiting  Genitourinary: Negative for difficulty urinating and dysuria  Neurological: Negative for dizziness and light-headedness           Past Medical History:   Diagnosis Date   • Diabetes mellitus ([address] )    • Diabetes mellitus ([facility] )     other type with circulatory complication, without long-term current   • Essential hypertension 12/19/2018   • Hearing loss     r ear only   • Obesity    • Onychomycosis of multiple toenails with type 2 diabetes mellitus [facility]     last assessed: 3/21/2017   • Status post cholecystectomy 12/19/2018         Current Outpatient Medications:   •  albuterol (PROVENTIL HFA,VENTOLIN HFA) 90 mcg/act inhaler, INHALE 2 PUFFS EVERY 6 HOURS AS NEEDED FOR WHEEZING, Disp: , Rfl:   •  benzonatate (TESSALON PERLES) 100 mg capsule, Take 1 capsule (100 mg total) by mouth 3 (three) times a day as needed for cough, Disp: 20 capsule, Rfl: 0  •  furosemide (LASIX) 20 mg tablet, Take 1 tablet (20 mg total) by mouth daily for 7 days, Disp: 7 tablet, Rfl: 0  •  guaiFENesin (MUCINEX) 600 mg 12 hr tablet, Take 600 mg by mouth 2 (two) times a day, Disp: , Rfl:   •  metFORMIN (GLUCOPHAGE) 1000 MG tablet, Take 1 tablet (1,000 mg total) by mouth 2 (two) times a day with meals, Disp: 180 tablet, Rfl: 1  •  oseltamivir (TAMIFLU) 75 mg capsule, TAKE 1 CAPSULE (75 MG TOTAL) BY MOUTH 2 (TWO) TIMES A DAY FOR 8 DOSES, Disp: , Rfl:   •  sildenafil (VIAGRA) 100 mg tablet, Take 1 tablet (100 mg total) by mouth daily as needed for erectile dysfunction, Disp: 10 tablet, Rfl: 3    No Known Allergies    Social History   Past Surgical History:   Procedure Laterality Date   • CHOLANGIOGRAM N/A 10/24/2018    Procedure: CHOLANGIOGRAM;  Surgeon: Ayesha Hagan MD;  Location: [facility];  Service: General   • CHOLECYSTECTOMY     • CHOLECYSTECTOMY LAPAROSCOPIC N/A 10/24/2018    Procedure: CHOLECYSTECTOMY LAPAROSCOPIC;  Surgeon: Ayesha Hagan MD;  Location: [facility];  Service: General   • MOUTH SURGERY  12/28/2017   • TONSILLECTOMY AND ADENOIDECTOMY       Family History   Problem Relation Age of Onset   • Diabetes Mother    • No Known Problems Father    • Stomach cancer Paternal Grandfather        Objective:  /84 (BP Location: Left arm, Patient Position: Sitting, Cuff Size: Large)   Pulse 82   Temp 98 8 °F (37 1 °C) (Temporal)   Resp 20   Ht 6' 1" (1 854 m)   Wt (!) 211 kg (465 lb)   SpO2 91%   BMI 61 35 kg/m² Recent Results (from the past 1344 hour(s))   HEMOGLOBIN A1C    Collection Time: 02/11/23  5:05 PM   Result Value Ref Range    Hemoglobin A1C 6 9 (H) <5 7 %    eAG, EST AVG Glucose 151 mg/dL mg/dL   HEMOGLOBIN A1C    Collection Time: 02/12/23  2:19 AM   Result Value Ref Range    Hemoglobin A1C 7 0 (H) <5 7 %    eAG, EST AVG Glucose 154 mg/dL mg/dL        Physical Exam  Constitutional:       Appearance: He is obese  HENT:      Head: Normocephalic  Mouth/Throat:      Mouth: Mucous membranes are moist    Eyes:      Extraocular Movements: Extraocular movements intact  Conjunctiva/sclera: Conjunctivae normal       Pupils: Pupils are equal, round, and reactive to light  Cardiovascular:      Rate and Rhythm: Normal rate and regular rhythm  Pulses: Normal pulses  Heart sounds: Normal heart sounds  Pulmonary:      Effort: Pulmonary effort is normal  No respiratory distress  Breath sounds: No stridor  Rhonchi ( b/l lower bases) present  No wheezing  Abdominal:      General: Bowel sounds are normal    Musculoskeletal:      Right lower leg: Edema ( +1 pitting  ) present  Left lower leg: Edema ( +1 pitting  ) present  Skin:     Capillary Refill: Capillary refill takes less than 2 seconds  Neurological:      Mental Status: He is alert and oriented to person, place, and time  Psychiatric:         Mood and Affect: Mood normal          Behavior: Behavior normal          Thought Content:  Thought content normal          Judgment: Judgment normal

## 2023-02-16 NOTE — ASSESSMENT & PLAN NOTE
-Patient to continue with 20 mg Furosemide daily for one more week   -Will check a BMP in one week to monitor potassium level   Current Potassium stable at 3 8  -Encouraged patient to eat a banana a day or to increase foods with potassium while on diuretic

## 2023-02-16 NOTE — ASSESSMENT & PLAN NOTE
-Patient currently not on any medication at this time    -Patient encouraged to monitor BP at home if able  -Will hold off on starting any medication and will reevaluate/discuss at next office visit

## 2023-02-16 NOTE — PATIENT INSTRUCTIONS
-Please go for BMP in one week to re-check potassium level  -continue furosemide 20 mg daily for one more week  -call office with any worsening symptoms especially with shortness of breath or difficulty breathing   -schedule echo test  -schedule pulmonary function testing      -follow-up in 3 months

## 2023-02-16 NOTE — ASSESSMENT & PLAN NOTE
Lab Results   Component Value Date    HGBA1C 7 0 (H) 02/12/2023     -Patient to continue on metformin 1000 mg twice daily   - Discussed the importance of better compliance with medication regimen  - Discussed diet, exercise and weight loss    -Patient to schedule DM eye exam- referral given to opthalamology   -will check microalbumin/creatinine ratio

## 2023-03-01 DIAGNOSIS — R60.0 FLUID RETENTION IN LEGS: ICD-10-CM

## 2023-03-01 NOTE — TELEPHONE ENCOUNTER
Patient called in for refill for furosemide 20 mg  Last prescribed for 7 days and patient reports he had improvement in his legs  Has been off the medication for 3 days and he is noticing edema returning  Requesting longer time frame to take medication  Please follow up with patient

## 2023-03-02 RX ORDER — FUROSEMIDE 20 MG/1
20 TABLET ORAL DAILY
Qty: 30 TABLET | Refills: 0 | Status: SHIPPED | OUTPATIENT
Start: 2023-03-02 | End: 2023-04-01

## 2023-03-16 NOTE — PROGRESS NOTES
Gillian is a 57 year old who is being evaluated via a billable video visit.      How would you like to obtain your AVS? MyChart  If the video visit is dropped, the invitation should be resent by: Text to cell phone: [phone]  Will anyone else be joining your video visit? No      Assessment & Plan     Generalized anxiety disorder with panic attacks  - Adult Mental Health  Referral; Future    Severe episode of recurrent major depressive disorder, without psychotic features (H)  - Adult Mental Health  Referral; Future    Adult attention deficit hyperactivity disorder  - Adult Mental Health  Referral; Future    Polyneuropathy associated with underlying disease (H)  - Lift Chair W Seat Lift Mechanism Order for DME - ONLY FOR DME    Chronic neck pain  - Physical Therapy Referral; Future     Depression Screening Follow Up    PHQ 3/16/2023   PHQ-9 Total Score 14   Q9: Thoughts of better off dead/self-harm past 2 weeks Not at all       Follow Up Actions Taken    1-2 weeks in person     Jose Sanchez [facility]    Ky French is a 57 year old presenting for the following health issues:  Establish Care      History of Present Illness       Mental Health Follow-up:  Patient presents to follow-up on Depression & Anxiety.Patient's depression since last visit has been:  Bad  The patient is having other symptoms associated with depression.  Patient's anxiety since last visit has been:  Worse  The patient is having other symptoms associated with anxiety.  Any significant life events: other  Patient is feeling anxious or having panic attacks.  Patient has no concerns about alcohol or drug use.    Diabetes:   She presents for follow up of diabetes.  She is checking home blood glucose two times daily. She checks blood glucose before meals and at bedtime.  Blood glucose is sometimes over 200 and sometimes under 70. She is aware of hypoglycemia symptoms including shakiness,  Hematology/Oncology Outpatient Follow- up Note  Denice Rubio 41 y o  male MRN: @ Encounter: [id number]        Date:  6/22/2018    Presenting Complaint/Diagnosis :   Marrow signal abnormality      HPI:     Denice Rubio was seen for initial consultation 5/10/2018 regarding Findings on MRI from December 2017  The patient had an MRI done showed marked abnormal diffusely hypointense marrow compatible with red marrow conversion versus infiltrative process  He was referred to see us  Myelofibrosis or myeloproliferative Was in the differential  Looking through his blood work over the last year he has had a hemoglobin that is fluctuated between Slightly high to normal  White count has been high on most occasions  Did have a small M spike on his blood work but on immunofixation was not done so this was not quantified  Previous Hematologic/ Oncologic History:    Workup    Current Hematologic/ Oncologic Treatment:    Workup    Interval History:    The patient returns for follow-up visit  I did myeloproliferative testing along with a myeloma workup which was all negative  This was based on the fact that he had an MRI done which had shown some abnormal diffusely hypointense marrow compatible with red marrow conversion versus infiltrative process  Again his CBC with differential was also normal as has been all the testing including a flow cytometry and a full myeloproliferative workup  He denies any nausea denies any vomiting denies any diarrhea  He is otherwise asymptomatic  The rest of his 14 point review of systems today was negative  Test Results:    Imaging: No results found      Labs:   Lab Results   Component Value Date    WBC 10 52 (H) 04/19/2018    HGB 15 7 05/15/2018    HCT 47 9 05/15/2018    MCV 92 05/15/2018     05/15/2018     Lab Results   Component Value Date     04/19/2018    K 4 3 04/19/2018     04/19/2018    CO2 31 04/19/2018    ANIONGAP 4 04/19/2018    BUN 16 05/15/2018 CREATININE 0 89 05/15/2018    GLUCOSE 103 04/19/2018    CALCIUM 8 2 (L) 04/19/2018     (H) 04/19/2018     (H) 04/19/2018    ALKPHOS 97 04/19/2018    PROT 7 0 04/19/2018    BILITOT 0 32 04/19/2018    EGFR 124 04/19/2018       ROS: As stated in the history of present illness otherwise his 14 point review of systems today was negative  Active Problems:   Patient Active Problem List   Diagnosis    Type 2 diabetes mellitus ([facility] [address] )    Morbid obesity ([facility] )    Acute right-sided low back pain with sciatica and paresthesia    Bone marrow disorder    Central stenosis of spinal canal    Elevated blood pressure reading    Elevated hemoglobin (HCC)    Foraminal stenosis of lumbosacral region    Intertrigo    Lumbar radiculitis    Lumbar spinal stenosis    Positive depression screening    Weakness of left lower extremity    Calculus of gallbladder without cholecystitis without obstruction       Past Medical History:   Past Medical History:   Diagnosis Date    Diabetes mellitus ([facility] )     Diabetes mellitus ([facility] )     other type with circulatory complication, without long-term current    Hearing loss     r ear only    Obesity     Onychomycosis of multiple toenails with type 2 diabetes mellitus ([facility] [address] )     last assessed: 3/21/2017       Surgical History:   Past Surgical History:   Procedure Laterality Date    MOUTH SURGERY  12/28/2017    TONSILLECTOMY AND ADENOIDECTOMY         Family History:    Family History   Problem Relation Age of Onset    Diabetes Mother     Stomach cancer Other        Cancer-related family history includes Stomach cancer in his other      Social History:   Social History     Social History    Marital status: Single     Spouse name: N/A    Number of children: N/A    Years of education: N/A     Occupational History    employed      Social History Main Topics    Smoking status: Never Smoker    Smokeless tobacco: Never Used    Alcohol use Yes      Comment: 1x weekly; dizziness, weakness, lethargy, blurred vision and confusion. She is concerned about blood sugar frequently over 200. She is having numbness in feet, burning in feet, redness, sores, or blisters on feet, excessive thirst, blurry vision and weight loss.         She eats 2-3 servings of fruits and vegetables daily.She consumes 0 sweetened beverage(s) daily.She exercises with enough effort to increase her heart rate 9 or less minutes per day.  She exercises with enough effort to increase her heart rate 3 or less days per week.   She is taking medications regularly.    Today's PHQ-9         PHQ-9 Total Score: 14    PHQ-9 Q9 Thoughts of better off dead/self-harm past 2 weeks :   Not at all    How difficult have these problems made it for you to do your work, take care of things at home, or get along with other people: Very difficult  Today's JAMES-7 Score: 12       Has a lot of anxiety     Very hard to control diabetes   Seeing endocrinology     Was very anxious about changing providers     Automatic pill dispenser     Full med rec done today      Arms worker will start working with her       Needs a lift chair   Cannot stand well from regular chair  Severe neuropathy in legs   Weakness in legs   Balance problems  Going to PT for balance   Once she is standing she can walk with the assistance of cane or walker     Review of Systems       Objective           Vitals:  No vitals were obtained today due to virtual visit.    Physical Exam   GENERAL: Healthy, alert and no distress  EYES: Eyes grossly normal to inspection.  No discharge or erythema, or obvious scleral/conjunctival abnormalities.  RESP: No audible wheeze, cough, or visible cyanosis.  No visible retractions or increased work of breathing.    SKIN: Visible skin clear. No significant rash, abnormal pigmentation or lesions.  NEURO: Cranial nerves grossly intact.  Mentation and speech appropriate for age.  PSYCH: Mentation appears normal, affect normal/bright, judgement  and insight intact, normal speech and appearance well-groomed.       Video-Visit Details    Type of service:  Video Visit   Video Start Time: 5:16 PM  Video End Time:5:58 PM    Originating Location (pt. Location): Home  Distant Location (provider location):  On-site  Platform used for Video Visit: Rex     social alcohol use (as per allscripts)    Drug use: No    Sexual activity: Not on file     Other Topics Concern    Not on file     Social History Narrative    Lives with parents           Current Medications:   Current Outpatient Prescriptions   Medication Sig Dispense Refill    Blood Glucose Monitoring Suppl (ONE TOUCH ULTRA 2) w/Device KIT by Does not apply route daily      cetirizine (ZyrTEC) 10 mg tablet Take 1 tablet (10 mg total) by mouth daily for 7 days 7 tablet 0    ibuprofen (ADVIL) 200 mg tablet Take by mouth      metFORMIN (GLUCOPHAGE) 500 mg tablet Take 1 tablet (500 mg total) by mouth 2 (two) times a day with meals 60 tablet 2    methocarbamol (ROBAXIN) 750 mg tablet TAKE 1 TO 2 TABLETS 3 TIMES DAILY AS NEEDED   2     No current facility-administered medications for this visit  Allergies: Allergies   Allergen Reactions    Menthol Rash       Physical Exam:    There is no height or weight on file to calculate BSA  Wt Readings from Last 3 Encounters:   05/10/18 (!) 168 kg (370 lb)   05/03/18 (!) 167 kg (369 lb)   04/19/18 (!) 171 kg (378 lb 1 4 oz)        Temp Readings from Last 3 Encounters:   05/10/18 (!) 97 4 °F (36 3 °C) (Oral)   04/19/18 98 °F (36 7 °C) (Oral)   04/12/18 98 2 °F (36 8 °C) (Oral)        BP Readings from Last 3 Encounters:   05/10/18 128/82   05/03/18 140/90   04/19/18 126/60         Pulse Readings from Last 3 Encounters:   05/10/18 87   05/03/18 68   04/19/18 65      Physical Exam     Constitutional   General appearance: No acute distress, well appearing and well nourished  Eyes   Conjunctiva and lids: No swelling, erythema or discharge  Pupils and irises: Equal, round and reactive to light  Ears, Nose, Mouth, and Throat   External inspection of ears and nose: Normal     Nasal mucosa, septum, and turbinates: Normal without edema or erythema  Oropharynx: Normal with no erythema, edema, exudate or lesions      Pulmonary   Respiratory effort: No increased work of breathing or signs of respiratory distress  Auscultation of lungs: Clear to auscultation  Cardiovascular   Palpation of heart: Normal PMI, no thrills  Auscultation of heart: Normal rate and rhythm, normal S1 and S2, without murmurs  Examination of extremities for edema and/or varicosities: Normal     Carotid pulses: Normal     Abdomen   Abdomen: Non-tender, no masses  Liver and spleen: No hepatomegaly or splenomegaly  Lymphatic   Palpation of lymph nodes in neck: No lymphadenopathy  Musculoskeletal   Gait and station: Normal     Digits and nails: Normal without clubbing or cyanosis  Inspection/palpation of joints, bones, and muscles: Normal     Skin   Skin and subcutaneous tissue: Normal without rashes or lesions  Neurologic   Cranial nerves: Cranial nerves 2-12 intact  Sensation: No sensory loss  Psychiatric   Orientation to person, place, and time: Normal     Mood and affect: Normal         Assessment / Plan:    The patient is a pleasant 61-year-old male who was referred to see us for some abnormal MRI findings along with small M spike in his blood work  I repeated all his blood work  He has no M spike  He is not anemic  He has a normal kidney function  His free light chain ratio is normal as are his immunoglobulins  JEK2 testing was negative  There is no evidence of myeloma on his blood work or myeloproliferative disorder  Flow cytometry did not show any abnormalities either  At this point I see no abnormalities on his peripheral CBC or the testing we did  I think a bone marrow biopsy would be low yield  I explained all this to him  I explained to him he still has the option to do it if he wishes but based on the testing so far I doubt he has a myeloproliferative disorder or myeloma  The patient is reassured with this  I will refer him back to his primary care physician  He can see us on an as needed basis  Goals and Barriers:  Current Goal:  Workup of abnormal MRI  Barriers: None  Patient's Capacity to Self Care:  Patient  able to self care  Portions of the record may have been created with voice recognition software   Occasional wrong word or "sound a like" substitutions may have occurred due to the inherent limitations of voice recognition software   Read the chart carefully and recognize, using context, where substitutions have occurred

## 2023-03-21 ENCOUNTER — PREPPED CHART (OUTPATIENT)
Dept: URBAN - METROPOLITAN AREA CLINIC 6 | Facility: CLINIC | Age: 43
End: 2023-03-21

## 2023-03-25 DIAGNOSIS — R60.0 FLUID RETENTION IN LEGS: ICD-10-CM

## 2023-03-27 RX ORDER — FUROSEMIDE 20 MG/1
TABLET ORAL
Qty: 90 TABLET | Refills: 1 | OUTPATIENT
Start: 2023-03-27

## 2023-05-01 ENCOUNTER — HOSPITAL ENCOUNTER (OUTPATIENT)
Dept: NON INVASIVE DIAGNOSTICS | Facility: CLINIC | Age: 43
Discharge: HOME | End: 2023-05-01

## 2023-05-01 VITALS
BODY MASS INDEX: 41.75 KG/M2 | DIASTOLIC BLOOD PRESSURE: 84 MMHG | SYSTOLIC BLOOD PRESSURE: 140 MMHG | WEIGHT: 315 LBS | HEART RATE: 82 BPM | HEIGHT: 73 IN

## 2023-05-01 DIAGNOSIS — I51.7 MILD CARDIOMEGALY: ICD-10-CM

## 2023-05-01 LAB
AORTIC ROOT: 3 CM
APICAL FOUR CHAMBER EJECTION FRACTION: 60 %
ASCENDING AORTA: 3.2 CM
E WAVE DECELERATION TIME: 263 MS
FRACTIONAL SHORTENING: 33 (ref 28–44)
INTERVENTRICULAR SEPTUM IN DIASTOLE (PARASTERNAL SHORT AXIS VIEW): 1 CM
INTERVENTRICULAR SEPTUM: 1 CM (ref 0.6–1.1)
LAAS-AP2: 35.5 CM2
LAAS-AP4: 31 CM2
LEFT ATRIUM SIZE: 4.6 CM
LEFT INTERNAL DIMENSION IN SYSTOLE: 3.8 CM (ref 2.1–4)
LEFT VENTRICULAR INTERNAL DIMENSION IN DIASTOLE: 5.7 CM (ref 3.5–6)
LEFT VENTRICULAR POSTERIOR WALL IN END DIASTOLE: 1 CM
LEFT VENTRICULAR STROKE VOLUME: 97 ML
LVSV (TEICH): 97 ML
MV E'TISSUE VEL-SEP: 14 CM/S
MV PEAK A VEL: 0.93 M/S
MV PEAK E VEL: 108 CM/S
MV STENOSIS PRESSURE HALF TIME: 76 MS
MV VALVE AREA P 1/2 METHOD: 2.89
RIGHT ATRIUM AREA SYSTOLE A4C: 26.3 CM2
RIGHT VENTRICLE ID DIMENSION: 5.7 CM
SL CV LEFT ATRIUM LENGTH A2C: 6.7 CM
SL CV LV EF: 55
SL CV PED ECHO LEFT VENTRICLE DIASTOLIC VOLUME (MOD BIPLANE) 2D: 160 ML
SL CV PED ECHO LEFT VENTRICLE SYSTOLIC VOLUME (MOD BIPLANE) 2D: 63 ML
TRICUSPID ANNULAR PLANE SYSTOLIC EXCURSION: 2.5 CM

## 2023-05-01 RX ADMIN — PERFLUTREN 0.4 ML/MIN: 6.52 INJECTION, SUSPENSION INTRAVENOUS at 14:09

## 2023-05-18 ENCOUNTER — OFFICE VISIT (OUTPATIENT)
Dept: INTERNAL MEDICINE CLINIC | Facility: OTHER | Age: 43
End: 2023-05-18

## 2023-05-18 VITALS
DIASTOLIC BLOOD PRESSURE: 98 MMHG | HEART RATE: 89 BPM | HEIGHT: 73 IN | RESPIRATION RATE: 18 BRPM | WEIGHT: 315 LBS | BODY MASS INDEX: 41.75 KG/M2 | SYSTOLIC BLOOD PRESSURE: 138 MMHG | TEMPERATURE: 98 F | OXYGEN SATURATION: 90 %

## 2023-05-18 DIAGNOSIS — E66.01 MORBID OBESITY (HCC): Chronic | ICD-10-CM

## 2023-05-18 DIAGNOSIS — I10 ESSENTIAL HYPERTENSION: ICD-10-CM

## 2023-05-18 DIAGNOSIS — I51.7 MILD CARDIOMEGALY: ICD-10-CM

## 2023-05-18 DIAGNOSIS — E11.8 TYPE 2 DIABETES MELLITUS WITH COMPLICATION (HCC): Primary | ICD-10-CM

## 2023-05-18 DIAGNOSIS — R60.0 FLUID RETENTION IN LEGS: ICD-10-CM

## 2023-05-18 PROBLEM — B34.9 VIRAL INFECTION, UNSPECIFIED: Status: RESOLVED | Noted: 2023-02-07 | Resolved: 2023-05-18

## 2023-05-18 LAB — SL AMB POCT HEMOGLOBIN AIC: 6 (ref ?–6.5)

## 2023-05-18 RX ORDER — LISINOPRIL 10 MG/1
10 TABLET ORAL DAILY
Qty: 90 TABLET | Refills: 1 | Status: SHIPPED | OUTPATIENT
Start: 2023-05-18 | End: 2023-05-18

## 2023-05-18 RX ORDER — FUROSEMIDE 20 MG/1
20 TABLET ORAL DAILY
Qty: 90 TABLET | Refills: 1 | Status: SHIPPED | OUTPATIENT
Start: 2023-05-18

## 2023-05-18 NOTE — ASSESSMENT & PLAN NOTE
Continue metformin 1000 mg twice a day  We will start [facility] for better diabetes control as well as weight loss  Follow-up in 3 months

## 2023-05-18 NOTE — ASSESSMENT & PLAN NOTE
Elevated today however likely due to pain and not feeling well (sinus congestion)  Will reevaluate in 3 months

## 2023-05-18 NOTE — PROGRESS NOTES
Assessment/Plan:    Mild cardiomegaly  Will refer to cardiology  Type 2 diabetes mellitus with complication (HCC)  Continue metformin 1000 mg twice a day  We will start [facility] [place] for better diabetes control as well as weight loss  Follow-up in 3 months  Essential hypertension  Elevated today however likely due to pain and not feeling well (sinus congestion)  Will reevaluate in 3 months  Morbid obesity ([facility] )  Discussed diet and exercise  Starting [facility] [place] will also help with weight loss    Fluid retention in legs  Continue Lasix 20 daily  Trend BMP  Diagnoses and all orders for this visit:    Type 2 diabetes mellitus with complication ([facility] )  -     Cancel: HEMOGLOBIN A1C W/ EAG ESTIMATION; Future  -     Semaglutide-Weight Management (WEGOVY) 0 25 MG/0 5ML; Inject 0 5 mL (0 25 mg total) under the skin once a week for 4 doses  -     POCT hemoglobin A1c  -     CBC; Future  -     Comprehensive metabolic panel; Future  -     Hemoglobin A1C; Future  -     Lipid panel; Future  -     Albumin / creatinine urine ratio; Future    Fluid retention in legs  -     furosemide (LASIX) 20 mg tablet; Take 1 tablet (20 mg total) by mouth daily    Mild cardiomegaly  -     Ambulatory Referral to Cardiology; Future    Essential hypertension  -     CBC; Future  -     Comprehensive metabolic panel; Future  -     Albumin / creatinine urine ratio; Future  -     Discontinue: lisinopril (ZESTRIL) 10 mg tablet; Take 1 tablet (10 mg total) by mouth daily    Morbid obesity (HCC)        BMI Counseling: Body mass index is 59 42 kg/m²  The BMI is above normal  Nutrition recommendations include decreasing portion sizes, encouraging healthy choices of fruits and vegetables, decreasing fast food intake, consuming healthier snacks, limiting drinks that contain sugar, moderation in carbohydrate intake, increasing intake of lean protein, reducing intake of saturated and trans fat and reducing intake of cholesterol   Exercise recommendations include moderate physical activity 150 minutes/week and exercising 3-5 times per week  No pharmacotherapy was ordered  Patient referred to PCP  Rationale for BMI follow-up plan is due to patient being overweight or obese  Depression Screening and Follow-up Plan: Patient was screened for depression during today's encounter  They screened negative with a PHQ-2 score of 0  Subjective:      Patient ID: Ewa Coronado is a 41 y o  male  Chief Complaint   Patient presents with   • Follow-up     3 month, labs from feb not done, ( need to collect U/A ), echo done 5/1/23   •      Annual, A1C due after 8/12/23, bmi f/u plan, PHQ, foot exam, eye exam ordered 2/16/23   • Leg Swelling     Still having some swelling in he's legs looking to see if he can have the water pill as needed       65-year-old male seen today for follow-up of chronic conditions  Recent laboratory studies reviewed today  Echo cardiogram showed LVEF 55%  All ventricles are mildly dilated  He ran out of furosemide 3 weeks ago and since has noticed some recurrence of lower extremity edema  He denies any dyspnea on exertion/shortness of breath  He has been following a healthier diet and has lost 15 lbs  He has been compliant with his medication reigmen and has no other complaints or concerns at this time  Diabetes  He presents for his follow-up diabetic visit  He has type 2 diabetes mellitus  His disease course has been stable  Pertinent negatives for hypoglycemia include no dizziness or headaches  There are no diabetic associated symptoms  Pertinent negatives for diabetes include no chest pain, no fatigue and no weakness  Risk factors for coronary artery disease include diabetes mellitus, obesity and male sex  Current diabetic treatment includes diet and oral agent (monotherapy)  He is compliant with treatment all of the time         The following portions of the patient's history were reviewed and updated as appropriate: allergies, current medications, past family history, past medical history, past social history, past surgical history and problem list     Review of Systems   Constitutional: Negative for activity change, appetite change, chills, diaphoresis, fatigue and fever  HENT: Negative for congestion, postnasal drip, rhinorrhea, sinus pressure, sinus pain, sneezing and sore throat  Eyes: Negative for visual disturbance  Respiratory: Negative for apnea, cough, choking, chest tightness, shortness of breath and wheezing  Cardiovascular: Negative for chest pain, palpitations and leg swelling  Gastrointestinal: Negative for abdominal distention, abdominal pain, anal bleeding, blood in stool, constipation, diarrhea, nausea and vomiting  Endocrine: Negative for cold intolerance and heat intolerance  Genitourinary: Negative for difficulty urinating, dysuria and hematuria  Musculoskeletal: Negative  Skin: Negative  Neurological: Negative for dizziness, weakness, light-headedness, numbness and headaches  Hematological: Negative for adenopathy  Psychiatric/Behavioral: Negative for agitation, sleep disturbance and suicidal ideas  All other systems reviewed and are negative          Past Medical History:   Diagnosis Date   • Diabetes mellitus ([facility] )    • [facility]     other type with circulatory complication, without long-term current   • Essential hypertension 12/19/2018   • Hearing loss     r ear only   • Obesity    • Onychomycosis of multiple toenails with type 2 diabetes mellitus ([facility] )     last assessed: 3/21/2017   • Status post cholecystectomy 12/19/2018         Current Outpatient Medications:   •  furosemide (LASIX) 20 mg tablet, Take 1 tablet (20 mg total) by mouth daily, Disp: 90 tablet, Rfl: 1  •  metFORMIN (GLUCOPHAGE) 1000 MG tablet, Take 1 tablet (1,000 mg total) by mouth 2 (two) times a day with meals, Disp: 180 tablet, Rfl: 1  •  Semaglutide-Weight Management (WEGOVY) 0 25 MG/0 5ML, "Inject 0 5 mL (0 25 mg total) under the skin once a week for 4 doses, Disp: 2 mL, Rfl: 0    No Known Allergies    Social History   Past Surgical History:   Procedure Laterality Date   • CHOLANGIOGRAM N/A 10/24/2018    Procedure: CHOLANGIOGRAM;  Surgeon: Arta Severe, MD;  Location: [facility];  Service: General   • CHOLECYSTECTOMY     • CHOLECYSTECTOMY LAPAROSCOPIC N/A 10/24/2018    Procedure: CHOLECYSTECTOMY LAPAROSCOPIC;  Surgeon: Arta Severe, MD;  Location: [facility];  Service: General   • MOUTH SURGERY  12/28/2017   • TONSILLECTOMY AND ADENOIDECTOMY       Family History   Problem Relation Age of Onset   • Diabetes Mother    • No Known Problems Father    • Stomach cancer Paternal Grandfather        Objective:  /98 (BP Location: Left arm, Patient Position: Sitting, Cuff Size: Large)   Pulse 89   Temp 98 °F (36 7 °C) (Temporal)   Resp 18   Ht 6' 1\" (1 854 m)   Wt (!) 204 kg (450 lb 6 4 oz)   SpO2 90%   BMI 59 42 kg/m²     Recent Results (from the past 1344 hour(s))   Echo complete w/ contrast if indicated    Collection Time: 05/01/23  2:01 PM   Result Value Ref Range    A4C EF 60 %    LVIDd 5 70 cm    LVIDS 3 80 cm    IVSd 1 00 cm    LVPWd 1 00 cm    FS 33 28 - 44    MV E' Tissue Velocity Septal 14 cm/s    E wave deceleration time 263 ms    MV Peak E [place] 108 cm/s    MV Peak A [place] 0 93 m/s    RVID d 5 7 cm    Tricuspid annular plane systolic excursion 5 74 cm    LA size 4 6 cm    LA length (A2C) 6 70 cm    RAA A4C 26 3 cm2    MV stenosis pressure 1/2 time 76 ms    MV valve area p 1/2 method 2 89     Ao root 3 00 cm    Asc Ao 3 2 cm    Left ventricular stroke volume (2D) 97 00 mL    IVS 1 cm    LEFT VENTRICLE SYSTOLIC VOLUME (MOD BIPLANE) 2D 63 mL    LV DIASTOLIC VOLUME (MOD BIPLANE) 2D 160 mL    Left Atrium Area-systolic Four Chamber 31 cm2    Left Atrium Area-systolic Apical Two Chamber 35 5 cm2    LVSV, 2D 97 mL    LV EF 55    POCT hemoglobin A1c    Collection Time: 05/18/23 10:08 AM " Result Value Ref Range    Hemoglobin A1C 6 0 6 5            Physical Exam  Vitals and nursing note reviewed  Constitutional:       General: He is not in acute distress  Appearance: He is well-developed  He is not diaphoretic  HENT:      Head: Normocephalic and atraumatic  Eyes:      General: No scleral icterus  Right eye: No discharge  Left eye: No discharge  Conjunctiva/sclera: Conjunctivae normal       Pupils: Pupils are equal, round, and reactive to light  Neck:      Thyroid: No thyromegaly  Vascular: No JVD  Cardiovascular:      Rate and Rhythm: Normal rate and regular rhythm  Pulses: no weak pulses          Dorsalis pedis pulses are 2+ on the right side and 2+ on the left side  Posterior tibial pulses are 2+ on the right side and 2+ on the left side  Heart sounds: Normal heart sounds  No murmur heard  No friction rub  No gallop  Pulmonary:      Effort: Pulmonary effort is normal  No respiratory distress  Breath sounds: Normal breath sounds  No wheezing or rales  Chest:      Chest wall: No tenderness  Abdominal:      General: Bowel sounds are normal  There is no distension  Palpations: Abdomen is soft  There is no mass  Tenderness: There is no abdominal tenderness  There is no guarding or rebound  Musculoskeletal:         General: No tenderness or deformity  Normal range of motion  Cervical back: Normal range of motion and neck supple  Feet:      Right foot:      Skin integrity: Callus and dry skin present  No ulcer, skin breakdown, erythema or warmth  Left foot:      Skin integrity: Callus and dry skin present  No ulcer, skin breakdown, erythema or warmth  Lymphadenopathy:      Cervical: No cervical adenopathy  Skin:     General: Skin is warm and dry  Coloration: Skin is not pale  Findings: No erythema or rash  Neurological:      Mental Status: He is alert and oriented to person, place, and time  Cranial Nerves: No cranial nerve deficit  Coordination: Coordination normal       Deep Tendon Reflexes: Reflexes are normal and symmetric  Psychiatric:         Behavior: Behavior normal          Thought Content: Thought content normal          Judgment: Judgment normal          Diabetic Foot Exam    Patient's shoes and socks removed  Right Foot/Ankle   Right Foot Inspection  Skin Exam: skin normal, skin intact, dry skin, callus and callus  No warmth, no erythema, no maceration, no abnormal color, no pre-ulcer and no ulcer  Toe Exam: ROM and strength within normal limits  Sensory   Monofilament testing: intact    Vascular  Capillary refills: < 3 seconds  The right DP pulse is 2+  The right PT pulse is 2+  Left Foot/Ankle  Left Foot Inspection  Skin Exam: skin normal, skin intact, dry skin and callus  No warmth, no erythema, no maceration, normal color, no pre-ulcer and no ulcer  Toe Exam: ROM and strength within normal limits  Sensory   Monofilament testing: intact    Vascular  Capillary refills: < 3 seconds  The left DP pulse is 2+  The left PT pulse is 2+       Assign Risk Category  No deformity present  No loss of protective sensation  No weak pulses  Risk: 0

## 2023-06-23 LAB
LEFT EYE DIABETIC RETINOPATHY: POSITIVE
RIGHT EYE DIABETIC RETINOPATHY: POSITIVE

## 2023-08-16 ENCOUNTER — TELEPHONE (OUTPATIENT)
Dept: INTERNAL MEDICINE CLINIC | Facility: OTHER | Age: 43
End: 2023-08-16

## 2023-08-16 NOTE — TELEPHONE ENCOUNTER
LMOM for pt to call me at [facility] office    Pt due for diabetic eye exam.  Please assist in scheduling.

## 2023-08-22 NOTE — TELEPHONE ENCOUNTER
Pt had eye exam done at [facility] eye June or July 2023 [address]. Will obtain report.

## 2023-08-23 ENCOUNTER — TELEPHONE (OUTPATIENT)
Dept: ADMINISTRATIVE | Facility: OTHER | Age: 43
End: 2023-08-23

## 2023-08-23 NOTE — TELEPHONE ENCOUNTER
----- Message from [facility] sent at 8/22/2023 12:16 PM EDT -----  08/22/23 12:16 PM  08/22/23 12:16 PM    Hello, our patient Pavithra Xiong has had Diabetic Eye Exam completed/performed. Please assist in updating the patient chart by making an External outreach to [facility] eye facility located in [facility]. The date of service is 6/24/2023.     Thank you,  [address] [place] [address]

## 2023-08-23 NOTE — LETTER
Diabetic Eye Exam Form    Date Requested: 23  Patient: Vero Grullon  Patient : 1980   Referring Provider: Nicolas Luevano MD      DIABETIC Eye Exam Date _______________________________      Type of Exam MUST be documented for Diabetic Eye Exams. Please CHECK ONE. Retinal Exam       Dilated Retinal Exam       OCT       Optomap-Iris Exam      Fundus Photography       Left Eye - Please check Retinopathy or No Retinopathy        Exam did show retinopathy    Exam did not show retinopathy       Right Eye - Please check Retinopathy or No Retinopathy       Exam did show retinopathy    Exam did not show retinopathy       Comments __________________________________________________________    Practice Providing Exam ______________________________________________    Exam Performed By (print name) _______________________________________      Provider Signature ___________________________________________________      These reports are needed for  compliance. Please fax this completed form and a copy of the Diabetic Eye Exam report to our office located at [address] [place] [address] as soon as possible via Fax [phone] attention Mari: Phone [phone]  We thank you for your assistance in treating our mutual patient.

## 2023-08-23 NOTE — TELEPHONE ENCOUNTER
Upon review of the In Basket request and the patient's chart, initial outreach has been made via fax to facility. Please see Contacts section for details.      Thank you  Raymon Hussein MA

## 2023-08-25 NOTE — TELEPHONE ENCOUNTER
Upon review of the In Basket request we were able to locate, review, and update the patient chart as requested for Diabetic Eye Exam.    Any additional questions or concerns should be emailed to the Practice Liaisons via the appropriate education email address, please do not reply via In Basket.     Thank you  Judy Noel MA

## 2023-10-05 ENCOUNTER — TELEPHONE (OUTPATIENT)
Dept: OTHER | Facility: OTHER | Age: 43
End: 2023-10-05

## 2024-07-09 ENCOUNTER — TELEPHONE (OUTPATIENT)
Dept: ADMINISTRATIVE | Facility: OTHER | Age: 44
End: 2024-07-09

## 2024-07-09 NOTE — TELEPHONE ENCOUNTER
Upon review of the In Basket request we were able to note that no further action is required. The patient chart is up to date as a result of a previous request.      Any additional questions or concerns should be emailed to the Practice Liaisons via the appropriate education email address, please do not reply via In Basket.    Thank you  Leny Ross   [facility]

## 2024-07-09 NOTE — TELEPHONE ENCOUNTER
----- Message from Chet MACHADO sent at 7/5/2024 10:06 PM EDT -----  07/05/24 10:06 PM    Hello, our patient Bennett Bridges has had Diabetic Eye Exam completed/performed. Please assist in updating the patient chart by pulling the document from the Media Tab. The date of service is 6/23/2023  .     Thank you,  Chet Mendiola  [facility]

## (undated) DEVICE — SPECIMEN CONTAINER STERILE PEEL PACK

## (undated) DEVICE — NEEDLE 25G X 1 1/2

## (undated) DEVICE — ADHESIVE SKN CLSR HISTOACRYL FLEX 0.5ML LF

## (undated) DEVICE — DRAPE C-ARM X-RAY

## (undated) DEVICE — STOPCOCK 3-WAY

## (undated) DEVICE — ENDOPATH PNEUMONEEDLE INSUFFLATION NEEDLES WITH LUER LOCK CONNECTORS 120MM: Brand: ENDOPATH

## (undated) DEVICE — ENDOPATH XCEL BLADELESS TROCARS WITH STABILITY SLEEVES: Brand: ENDOPATH XCEL

## (undated) DEVICE — PENCIL ELECTROSURG E-Z CLEAN -0035H

## (undated) DEVICE — SUT VICRYL 0 UR-6 27 IN J603H

## (undated) DEVICE — ENDOPATH XCEL UNIVERSAL TROCAR STABLILITY SLEEVES: Brand: ENDOPATH XCEL

## (undated) DEVICE — PDS II VLT 0 107CM AG ST3: Brand: ENDOLOOP

## (undated) DEVICE — SYRINGE 20ML LL

## (undated) DEVICE — DRAPE SHEET THREE QUARTER

## (undated) DEVICE — Device: Brand: MEDEX

## (undated) DEVICE — Device: Brand: OMNICLOSE TROCAR SITE CLOSURE DEVICE

## (undated) DEVICE — SKIN MARKER DUAL TIP WITH RULER CAP, FLEXIBLE RULER AND LABELS: Brand: DEVON

## (undated) DEVICE — SUT MONOCRYL 4-0 PS-2 18 IN Y496G

## (undated) DEVICE — LIGAMAX 5 MM ENDOSCOPIC MULTIPLE CLIP APPLIER: Brand: LIGAMAX

## (undated) DEVICE — GLOVE INDICATOR PI UNDERGLOVE SZ 7 BLUE

## (undated) DEVICE — ENDOPOUCH RETRIEVER SPECIMEN RETRIEVAL BAGS: Brand: ENDOPOUCH RETRIEVER

## (undated) DEVICE — PACK PBDS LAP CHOLE RF

## (undated) DEVICE — CATHETER KUMAR

## (undated) DEVICE — CHLORAPREP HI-LITE 26ML ORANGE

## (undated) DEVICE — INTENDED FOR TISSUE SEPARATION, AND OTHER PROCEDURES THAT REQUIRE A SHARP SURGICAL BLADE TO PUNCTURE OR CUT.: Brand: BARD-PARKER SAFETY BLADES SIZE 11, STERILE

## (undated) DEVICE — GLOVE SRG LF STRL BGL SKNSNS 6.5 PF

## (undated) DEVICE — 3000CC GUARDIAN II: Brand: GUARDIAN

## (undated) DEVICE — ENDOPATH 5MM CURVED SCISSORS WITH MONOPOLAR CAUTERY: Brand: ENDOPATH

## (undated) DEVICE — ELECTRODE LAP J HOOK SPLIT STEM E-Z CLEAN 33CM -0021S